# Patient Record
Sex: FEMALE | Race: BLACK OR AFRICAN AMERICAN | NOT HISPANIC OR LATINO | Employment: FULL TIME | URBAN - METROPOLITAN AREA
[De-identification: names, ages, dates, MRNs, and addresses within clinical notes are randomized per-mention and may not be internally consistent; named-entity substitution may affect disease eponyms.]

---

## 2017-01-21 DIAGNOSIS — I10 HYPERTENSION: ICD-10-CM

## 2017-01-21 DIAGNOSIS — I10 ESSENTIAL HYPERTENSION: ICD-10-CM

## 2017-01-22 RX ORDER — NIFEDIPINE 60 MG/1
TABLET, EXTENDED RELEASE ORAL
Qty: 30 TABLET | Refills: 0 | Status: SHIPPED | OUTPATIENT
Start: 2017-01-22 | End: 2017-03-05 | Stop reason: SDUPTHER

## 2017-01-22 RX ORDER — METOPROLOL SUCCINATE 50 MG/1
TABLET, EXTENDED RELEASE ORAL
Qty: 30 TABLET | Refills: 0 | Status: SHIPPED | OUTPATIENT
Start: 2017-01-22 | End: 2017-03-05 | Stop reason: SDUPTHER

## 2017-02-01 ENCOUNTER — PATIENT MESSAGE (OUTPATIENT)
Dept: OBSTETRICS AND GYNECOLOGY | Facility: CLINIC | Age: 52
End: 2017-02-01

## 2017-02-01 ENCOUNTER — PATIENT MESSAGE (OUTPATIENT)
Dept: INTERNAL MEDICINE | Facility: CLINIC | Age: 52
End: 2017-02-01

## 2017-02-13 ENCOUNTER — PATIENT MESSAGE (OUTPATIENT)
Dept: OBSTETRICS AND GYNECOLOGY | Facility: CLINIC | Age: 52
End: 2017-02-13

## 2017-02-13 ENCOUNTER — PATIENT MESSAGE (OUTPATIENT)
Dept: INTERNAL MEDICINE | Facility: CLINIC | Age: 52
End: 2017-02-13

## 2017-02-22 DIAGNOSIS — I10 ESSENTIAL HYPERTENSION: ICD-10-CM

## 2017-02-22 DIAGNOSIS — I10 HYPERTENSION: ICD-10-CM

## 2017-02-22 RX ORDER — CHLORTHALIDONE 25 MG/1
TABLET ORAL
Qty: 90 TABLET | Refills: 0 | OUTPATIENT
Start: 2017-02-22

## 2017-02-22 RX ORDER — SPIRONOLACTONE 25 MG/1
TABLET ORAL
Qty: 60 TABLET | Refills: 0 | OUTPATIENT
Start: 2017-02-22

## 2017-03-05 DIAGNOSIS — I10 ESSENTIAL HYPERTENSION: ICD-10-CM

## 2017-03-05 DIAGNOSIS — I10 HYPERTENSION: ICD-10-CM

## 2017-03-06 RX ORDER — METOPROLOL SUCCINATE 50 MG/1
TABLET, EXTENDED RELEASE ORAL
Qty: 30 TABLET | Refills: 1 | Status: SHIPPED | OUTPATIENT
Start: 2017-03-06 | End: 2017-04-30 | Stop reason: SDUPTHER

## 2017-03-06 RX ORDER — SPIRONOLACTONE 25 MG/1
TABLET ORAL
Qty: 60 TABLET | Refills: 0 | OUTPATIENT
Start: 2017-03-06

## 2017-03-06 RX ORDER — CHLORTHALIDONE 25 MG/1
TABLET ORAL
Qty: 90 TABLET | Refills: 0 | Status: SHIPPED | OUTPATIENT
Start: 2017-03-06 | End: 2017-04-30 | Stop reason: SDUPTHER

## 2017-03-06 RX ORDER — NIFEDIPINE 60 MG/1
TABLET, EXTENDED RELEASE ORAL
Qty: 30 TABLET | Refills: 1 | Status: SHIPPED | OUTPATIENT
Start: 2017-03-06 | End: 2017-04-30 | Stop reason: SDUPTHER

## 2017-03-09 ENCOUNTER — PATIENT MESSAGE (OUTPATIENT)
Dept: INTERNAL MEDICINE | Facility: CLINIC | Age: 52
End: 2017-03-09

## 2017-03-09 DIAGNOSIS — I10 ESSENTIAL HYPERTENSION: ICD-10-CM

## 2017-03-09 DIAGNOSIS — I10 HYPERTENSION: ICD-10-CM

## 2017-03-09 DIAGNOSIS — E87.6 HYPOKALEMIA: ICD-10-CM

## 2017-03-09 RX ORDER — POTASSIUM CHLORIDE 20 MEQ/1
TABLET, EXTENDED RELEASE ORAL
Qty: 270 TABLET | Refills: 0 | Status: CANCELLED | OUTPATIENT
Start: 2017-03-09

## 2017-03-09 RX ORDER — METOPROLOL SUCCINATE 50 MG/1
50 TABLET, EXTENDED RELEASE ORAL DAILY
Qty: 30 TABLET | Refills: 1 | Status: CANCELLED | OUTPATIENT
Start: 2017-03-09

## 2017-03-09 RX ORDER — NIFEDIPINE 60 MG/1
60 TABLET, EXTENDED RELEASE ORAL DAILY
Qty: 30 TABLET | Refills: 1 | Status: CANCELLED | OUTPATIENT
Start: 2017-03-09

## 2017-03-09 RX ORDER — CHLORTHALIDONE 25 MG/1
25 TABLET ORAL EVERY MORNING
Qty: 90 TABLET | Refills: 0 | Status: CANCELLED | OUTPATIENT
Start: 2017-03-09

## 2017-03-10 DIAGNOSIS — E78.5 HLD (HYPERLIPIDEMIA): ICD-10-CM

## 2017-03-11 RX ORDER — SPIRONOLACTONE 25 MG/1
25 TABLET ORAL 2 TIMES DAILY
Qty: 60 TABLET | Refills: 1 | Status: SHIPPED | OUTPATIENT
Start: 2017-03-11 | End: 2017-04-30 | Stop reason: SDUPTHER

## 2017-03-13 RX ORDER — ATORVASTATIN CALCIUM 20 MG/1
20 TABLET, FILM COATED ORAL DAILY
Qty: 30 TABLET | Refills: 3 | Status: SHIPPED | OUTPATIENT
Start: 2017-03-13 | End: 2017-04-30 | Stop reason: SDUPTHER

## 2017-03-14 ENCOUNTER — HOSPITAL ENCOUNTER (OUTPATIENT)
Dept: RADIOLOGY | Facility: HOSPITAL | Age: 52
Discharge: HOME | End: 2017-03-14
Attending: INTERNAL MEDICINE
Payer: COMMERCIAL

## 2017-03-14 DIAGNOSIS — Z12.31 OTHER SCREENING MAMMOGRAM: ICD-10-CM

## 2017-03-14 PROCEDURE — 77067 SCR MAMMO BI INCL CAD: CPT | Mod: 26,,, | Performed by: RADIOLOGY

## 2017-03-14 PROCEDURE — 77067 SCR MAMMO BI INCL CAD: CPT | Mod: TC

## 2017-03-15 ENCOUNTER — OFFICE VISIT (OUTPATIENT)
Dept: OBSTETRICS AND GYNECOLOGY | Facility: CLINIC | Age: 52
End: 2017-03-15
Payer: COMMERCIAL

## 2017-03-15 VITALS
SYSTOLIC BLOOD PRESSURE: 138 MMHG | DIASTOLIC BLOOD PRESSURE: 86 MMHG | HEIGHT: 71 IN | BODY MASS INDEX: 38.73 KG/M2 | WEIGHT: 276.69 LBS

## 2017-03-15 DIAGNOSIS — Z01.419 ENCOUNTER FOR GYNECOLOGICAL EXAMINATION WITHOUT ABNORMAL FINDING: Primary | ICD-10-CM

## 2017-03-15 DIAGNOSIS — Z12.31 SCREENING MAMMOGRAM FOR HIGH-RISK PATIENT: ICD-10-CM

## 2017-03-15 DIAGNOSIS — Z12.4 PAP SMEAR FOR CERVICAL CANCER SCREENING: ICD-10-CM

## 2017-03-15 PROCEDURE — 99386 PREV VISIT NEW AGE 40-64: CPT | Mod: S$GLB,,, | Performed by: OBSTETRICS & GYNECOLOGY

## 2017-03-15 PROCEDURE — 88175 CYTOPATH C/V AUTO FLUID REDO: CPT

## 2017-03-15 PROCEDURE — 3079F DIAST BP 80-89 MM HG: CPT | Mod: S$GLB,,, | Performed by: OBSTETRICS & GYNECOLOGY

## 2017-03-15 PROCEDURE — 3075F SYST BP GE 130 - 139MM HG: CPT | Mod: S$GLB,,, | Performed by: OBSTETRICS & GYNECOLOGY

## 2017-03-15 PROCEDURE — 99999 PR PBB SHADOW E&M-EST. PATIENT-LVL III: CPT | Mod: PBBFAC,,, | Performed by: OBSTETRICS & GYNECOLOGY

## 2017-03-15 NOTE — PROGRESS NOTES
Subjective:       Patient ID: Lori Burgess is a 51 y.o. female.    Chief Complaint:  Well Woman      History of Present Illness  HPI  Lori Burgess is a 51 y.o. female  here for her annual GYN exam.  She has not been seen in over 3 years.  She describes her periods as stopped with LSH (Laparoscopic Supracervical Hysterectomy).  Denies break through bleeding.   Denies vaginal itching or irritation.  Denies vaginal discharge.  She is sexually active. She has had1 partner for the past year.     History of abnormal pap: No  Last Pap: approximate date  and was normal  Last MMG: normal--routine follow-up in 12 months  Last Colonoscopy:  May 2015: normal  Denies domestic violence. She does feel safe at home.     Past Medical History:   Diagnosis Date    Anemia     Diverticulosis     History of gastroesophageal reflux (GERD)     Hyperlipidemia     Hypertension     Stroke 2015    Trauma 1998    Fractured Right ankle     Past Surgical History:   Procedure Laterality Date    BARTHOLIN GLAND CYST EXCISION Bilateral     HYSTERECTOMY  10/2006    Laparoscopic Supracervical Hysterectomy    TUBAL LIGATION       Social History     Social History    Marital status: Single     Spouse name: N/A    Number of children: N/A    Years of education: N/A     Occupational History     [employer]     Social History Main Topics    Smoking status: Never Smoker    Smokeless tobacco: Never Used    Alcohol use Yes      Comment: rarely    Drug use: No    Sexual activity: Yes     Partners: Male     Birth control/ protection: None, Condom, See Surgical Hx     Other Topics Concern    Not on file     Social History Narrative     Family History   Problem Relation Age of Onset    Colon cancer Mother 55    Hypertension Mother     Diabetes Father     Hypertension Father     Hypertension Brother     Hypertension Sister     Heart disease Neg Hx     Heart attack Neg Hx     Breast cancer  "Neg Hx     Ovarian cancer Neg Hx      OB History      Para Term  AB TAB SAB Ectopic Multiple Living    3 3 3       3          /86  Ht 5' 11" (1.803 m)  Wt 125.5 kg (276 lb 10.8 oz)  LMP  (LMP Unknown)  BMI 38.59 kg/m2        GYN & OB History  No LMP recorded (lmp unknown). Patient has had a hysterectomy.   Date of Last Pap: 2012    OB History    Para Term  AB SAB TAB Ectopic Multiple Living   3 3 3       3      # Outcome Date GA Lbr Lion/2nd Weight Sex Delivery Anes PTL Lv   3 Term      Vag-Spont  N Y   2 Term      Vag-Spont  N Y   1 Term      Vag-Spont  N Y          Review of Systems  Review of Systems   Constitutional: Negative for activity change, appetite change, fatigue and unexpected weight change.   HENT: Negative.    Eyes: Negative for visual disturbance.   Respiratory: Negative for shortness of breath and wheezing.    Cardiovascular: Negative for chest pain, palpitations and leg swelling.   Gastrointestinal: Negative for abdominal pain, bloating and blood in stool.   Endocrine: Positive for hot flashes. Negative for diabetes and hair loss.   Genitourinary: Negative for decreased libido, dyspareunia and postmenopausal bleeding.   Musculoskeletal: Negative for back pain and joint swelling.   Skin:  Negative for no acne and hair changes.   Neurological: Negative for headaches.   Hematological: Does not bruise/bleed easily.   Psychiatric/Behavioral: Positive for sleep disturbance. Negative for depression. The patient is not nervous/anxious.    Breast: Negative for breast pain and nipple discharge          Objective:    Physical Exam:   Constitutional: She is oriented to person, place, and time. She appears well-developed and well-nourished.    HENT:   Head: Normocephalic and atraumatic.    Eyes: EOM are normal. Pupils are equal, round, and reactive to light.    Neck: Normal range of motion. Neck supple.    Cardiovascular: Normal rate and regular rhythm.   "   Pulmonary/Chest: Effort normal and breath sounds normal.   BREASTS: Symmetrical, no skin changes or visible lesions.  No palpable masses, nipple discharge bilaterally.          Abdominal: Soft. Bowel sounds are normal.     Genitourinary: Vagina normal. Pelvic exam was performed with patient supine.   Genitourinary Comments: PELVIC: Normal external genitalia without lesions.  Normal hair distribution.  Adequate perineal body, normal urethral meatus.  Vagina moist and well rugated without lesions or discharge.  Cervix pink, without lesions, discharge or tenderness.  No significant cystocele or rectocele.  Bimanual exam shows uterus to be SURGICALLY ABSENT.  Adnexa without masses or tenderness.               Musculoskeletal: Normal range of motion and moves all extremeties.       Neurological: She is alert and oriented to person, place, and time.    Skin: Skin is warm and dry.    Psychiatric: She has a normal mood and affect.          Assessment:        1. Encounter for gynecological examination without abnormal finding    2. Pap smear for cervical cancer screening    3. Screening mammogram for high-risk patient                Plan:      1. Encounter for gynecological examination without abnormal finding  COUNSELING:  The patient was counseled today on osteoporosis prevention, calcium supplementation, and regular weight bearing exercise. The patient was also counseled today on ACS PAP guidelines, with recommendations for yearly pelvic exams unless their uterus, cervix, and ovaries were removed for benign reasons; in that case, examinations every 3-5 years are recommended. The patient was also counseled regarding monthly breast self-examination, routine STD screening for at-risk populations, prophylactic immunizations for transmitted infections such as HPV, Pertussis, or Influenza as appropriate, and yearly mammograms when indicated by ACS guidelines. She was advised to see her primary care physician for all other  health maintenance.   FOLLOW-UP with me for next routine visit.         2. Pap smear for cervical cancer screening    - Liquid-based pap smear, screening    3. Screening mammogram for high-risk patient  - Mammo Digital Screening Bilat with CAD; Future       Return in about 1 year (around 3/15/2018).

## 2017-04-25 ENCOUNTER — OFFICE VISIT (OUTPATIENT)
Dept: INTERNAL MEDICINE | Facility: CLINIC | Age: 52
End: 2017-04-25
Payer: COMMERCIAL

## 2017-04-25 ENCOUNTER — LAB VISIT (OUTPATIENT)
Dept: LAB | Facility: HOSPITAL | Age: 52
End: 2017-04-25
Attending: INTERNAL MEDICINE
Payer: COMMERCIAL

## 2017-04-25 VITALS
SYSTOLIC BLOOD PRESSURE: 136 MMHG | DIASTOLIC BLOOD PRESSURE: 82 MMHG | BODY MASS INDEX: 37.05 KG/M2 | WEIGHT: 264.63 LBS | HEIGHT: 71 IN | HEART RATE: 68 BPM

## 2017-04-25 DIAGNOSIS — Z86.73 HISTORY OF STROKE: ICD-10-CM

## 2017-04-25 DIAGNOSIS — R73.03 PRE-DIABETES: ICD-10-CM

## 2017-04-25 DIAGNOSIS — Z00.00 ROUTINE MEDICAL EXAM: ICD-10-CM

## 2017-04-25 DIAGNOSIS — Z11.59 NEED FOR HEPATITIS C SCREENING TEST: ICD-10-CM

## 2017-04-25 DIAGNOSIS — I10 ESSENTIAL HYPERTENSION: ICD-10-CM

## 2017-04-25 DIAGNOSIS — Z00.00 ROUTINE MEDICAL EXAM: Primary | ICD-10-CM

## 2017-04-25 LAB
25(OH)D3+25(OH)D2 SERPL-MCNC: 19 NG/ML
ALBUMIN SERPL BCP-MCNC: 3.4 G/DL
ALP SERPL-CCNC: 82 U/L
ALT SERPL W/O P-5'-P-CCNC: 18 U/L
ANION GAP SERPL CALC-SCNC: 12 MMOL/L
AST SERPL-CCNC: 15 U/L
BACTERIA #/AREA URNS AUTO: ABNORMAL /HPF
BASOPHILS # BLD AUTO: 0.03 K/UL
BASOPHILS NFR BLD: 0.5 %
BILIRUB SERPL-MCNC: 0.5 MG/DL
BILIRUB UR QL STRIP: NEGATIVE
BUN SERPL-MCNC: 10 MG/DL
CALCIUM SERPL-MCNC: 9.3 MG/DL
CHLORIDE SERPL-SCNC: 101 MMOL/L
CHOLEST/HDLC SERPL: 3.9 {RATIO}
CLARITY UR REFRACT.AUTO: ABNORMAL
CO2 SERPL-SCNC: 29 MMOL/L
COLOR UR AUTO: ABNORMAL
CREAT SERPL-MCNC: 0.9 MG/DL
DIFFERENTIAL METHOD: ABNORMAL
EOSINOPHIL # BLD AUTO: 0.1 K/UL
EOSINOPHIL NFR BLD: 1.1 %
ERYTHROCYTE [DISTWIDTH] IN BLOOD BY AUTOMATED COUNT: 16.6 %
EST. GFR  (AFRICAN AMERICAN): >60 ML/MIN/1.73 M^2
EST. GFR  (NON AFRICAN AMERICAN): >60 ML/MIN/1.73 M^2
GLUCOSE SERPL-MCNC: 103 MG/DL
GLUCOSE UR QL STRIP: NEGATIVE
HCT VFR BLD AUTO: 37.6 %
HDL/CHOLESTEROL RATIO: 25.4 %
HDLC SERPL-MCNC: 130 MG/DL
HDLC SERPL-MCNC: 33 MG/DL
HGB BLD-MCNC: 12 G/DL
HGB UR QL STRIP: NEGATIVE
HYALINE CASTS UR QL AUTO: 1 /LPF
KETONES UR QL STRIP: NEGATIVE
LDLC SERPL CALC-MCNC: 80 MG/DL
LEUKOCYTE ESTERASE UR QL STRIP: ABNORMAL
LYMPHOCYTES # BLD AUTO: 1.7 K/UL
LYMPHOCYTES NFR BLD: 25.2 %
MCH RBC QN AUTO: 23.3 PG
MCHC RBC AUTO-ENTMCNC: 31.9 %
MCV RBC AUTO: 73 FL
MICROSCOPIC COMMENT: ABNORMAL
MONOCYTES # BLD AUTO: 0.5 K/UL
MONOCYTES NFR BLD: 7.4 %
NEUTROPHILS # BLD AUTO: 4.4 K/UL
NEUTROPHILS NFR BLD: 65.5 %
NITRITE UR QL STRIP: NEGATIVE
NON-SQ EPI CELLS #/AREA URNS AUTO: 1 /HPF
NONHDLC SERPL-MCNC: 97 MG/DL
PH UR STRIP: 5 [PH] (ref 5–8)
PLATELET # BLD AUTO: 310 K/UL
PMV BLD AUTO: 11.2 FL
POTASSIUM SERPL-SCNC: 3.1 MMOL/L
PROT SERPL-MCNC: 8.2 G/DL
PROT UR QL STRIP: ABNORMAL
RBC # BLD AUTO: 5.16 M/UL
RBC #/AREA URNS AUTO: 3 /HPF (ref 0–4)
SODIUM SERPL-SCNC: 142 MMOL/L
SP GR UR STRIP: >1.03 (ref 1–1.03)
SQUAMOUS #/AREA URNS AUTO: 3 /HPF
TRIGL SERPL-MCNC: 85 MG/DL
URN SPEC COLLECT METH UR: ABNORMAL
UROBILINOGEN UR STRIP-ACNC: ABNORMAL EU/DL
WBC # BLD AUTO: 6.64 K/UL
WBC #/AREA URNS AUTO: 31 /HPF (ref 0–5)

## 2017-04-25 PROCEDURE — 81001 URINALYSIS AUTO W/SCOPE: CPT

## 2017-04-25 PROCEDURE — 36415 COLL VENOUS BLD VENIPUNCTURE: CPT

## 2017-04-25 PROCEDURE — 99396 PREV VISIT EST AGE 40-64: CPT | Mod: S$GLB,,, | Performed by: INTERNAL MEDICINE

## 2017-04-25 PROCEDURE — 83036 HEMOGLOBIN GLYCOSYLATED A1C: CPT

## 2017-04-25 PROCEDURE — 80061 LIPID PANEL: CPT

## 2017-04-25 PROCEDURE — 86803 HEPATITIS C AB TEST: CPT

## 2017-04-25 PROCEDURE — 80053 COMPREHEN METABOLIC PANEL: CPT

## 2017-04-25 PROCEDURE — 3079F DIAST BP 80-89 MM HG: CPT | Mod: S$GLB,,, | Performed by: INTERNAL MEDICINE

## 2017-04-25 PROCEDURE — 85025 COMPLETE CBC W/AUTO DIFF WBC: CPT

## 2017-04-25 PROCEDURE — 3075F SYST BP GE 130 - 139MM HG: CPT | Mod: S$GLB,,, | Performed by: INTERNAL MEDICINE

## 2017-04-25 PROCEDURE — 82306 VITAMIN D 25 HYDROXY: CPT

## 2017-04-25 PROCEDURE — 99999 PR PBB SHADOW E&M-EST. PATIENT-LVL III: CPT | Mod: PBBFAC,,, | Performed by: INTERNAL MEDICINE

## 2017-04-25 NOTE — MR AVS SNAPSHOT
[facility]  [address]  [facility] [address]  Phone: [phone]  Fax: [phone]                  Lori Burgess   2017 9:30 AM   Office Visit    Description:  Female : 1965   Provider:  Darya Clinton MD   Department:  [facility]           Reason for Visit     Annual Exam           Diagnoses this Visit        Comments    Routine medical exam    -  Primary     Essential hypertension         Pre-diabetes         History of stroke         Obesity, Class II, BMI 35-39.9, with comorbidity         Need for hepatitis C screening test                To Do List           Future Appointments        Provider Department Dept Phone    2017 10:30 AM LAB, APPOINTMENT [facility] [phone]    2017 9:00 AM Darya Clinton MD [facility] [phone]      Goals (5 Years of Data)     None      Follow-Up and Disposition     Return in about 4 months (around 2017) for Blood Pressure Check.      [facility]     [facility] Nurse Care Line -  Assistance  Unless otherwise directed by your provider, please contact [facility], our nurse care line that is available for  assistance.     Registered nurses in the [facility] provide: appointment scheduling, clinical advisement, health education, and other advisory services.  Call: [phone] (toll free)               Medications           Message regarding Medications     Verify the changes and/or additions to your medication regime listed below are the same as discussed with your clinician today.  If any of these changes or additions are incorrect, please notify your healthcare provider.             Verify that the below list of medications is an accurate representation of the medications you are currently taking.  If none reported, the list may be blank. If incorrect, please contact your healthcare  "provider. Carry this list with you in case of emergency.           Current Medications     aspirin (ECOTRIN) 325 MG EC tablet Take 1 tablet (325 mg total) by mouth once daily.    chlorthalidone (HYGROTEN) 25 MG Tab TAKE ONE TABLET BY MOUTH IN THE MORNING    KLOR-CON M20 20 mEq tablet TAKE TWO TABLETS BY MOUTH ONCE DAILY IN THE MORNING AND ONE ONCE DAILY IN THE EVENING    metoprolol succinate (TOPROL-XL) 50 MG 24 hr tablet TAKE ONE TABLET BY MOUTH ONCE DAILY    nifedipine (PROCARDIA-XL) 60 MG (OSM) 24 hr tablet TAKE ONE TABLET BY MOUTH ONCE DAILY    spironolactone (ALDACTONE) 25 MG tablet Take 1 tablet (25 mg total) by mouth 2 (two) times daily.    atorvastatin (LIPITOR) 20 MG tablet Take 1 tablet (20 mg total) by mouth once daily.           Clinical Reference Information           Your Vitals Were     BP Pulse Height Weight Last Period BMI    136/82 68 5' 11" (1.803 m) 120 kg (264 lb 9.6 oz) (LMP Unknown) 36.9 kg/m2      Blood Pressure          Most Recent Value    BP  136/82      Allergies as of 4/25/2017     Ace Inhibitors    Benicar [Olmesartan]    Methyldopa    Clonidine      Immunizations Administered on Date of Encounter - 4/25/2017     None      Orders Placed During Today's Visit      Normal Orders This Visit    Urinalysis     Future Labs/Procedures Expected by Expires    CBC auto differential  4/25/2017 4/25/2018    Comprehensive metabolic panel  4/25/2017 4/25/2018    Hemoglobin A1c  4/25/2017 4/25/2018    Hepatitis C antibody  4/25/2017 6/24/2018    Lipid panel  4/25/2017 4/25/2018    Vitamin D  4/25/2017 4/25/2018      Language Assistance Services     ATTENTION: Language assistance services are available, free of charge. Please call [phone].      ATENCIÓN: Si habla español, tiene a montelongo disposición servicios gratuitos de asistencia lingüística. Llame al [phone].     PUNEET Ý: N?u b?n nói Ti?ng Vi?t, có các d?ch v? h? tr? ngôn ng? mi?n phí dành cho b?n. G?i s? [phone].         [facility] " [facility] complies with applicable Federal civil rights laws and does not discriminate on the basis of race, color, national origin, age, disability, or sex.

## 2017-04-26 LAB
ESTIMATED AVG GLUCOSE: 137 MG/DL
HBA1C MFR BLD HPLC: 6.4 %
HCV AB SERPL QL IA: NEGATIVE

## 2017-04-26 NOTE — PROGRESS NOTES
Subjective:       Patient ID: Lori Burgess is a 51 y.o. female.    Chief Complaint: Annual Exam    HPI Comments: Last seen 20 months ago. Returns for annual exam and f/u chronic medical conditions. No acute complaints.     PMH: .   Hypertension since age 20.   Left Lacunar CVA.  Pre-Diabetes, HbA1c 6.1%.  GERD.   Sigmoid Diverticulosis.   H/O Iron-def. Anemia.   Obesity.     PSH: BTL, Hysterectomy (benign), ovaries intact. Bilateral Bartholin's cysts.     Mammogram normal 3/17, Pap normal 3/17. Colonoscopy normal 5/15. Eye exam 2016 at [facility]. Flu shot .     Social: No tobacco or alcohol. , three adult children all live locally, six grandchildren.  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     FMH: Hypertension in both parents and siblings. Father  age 50 with complications of Diabetes including heart failure, kidney failure, bilateral amputee. Mother  age 55 with Colon cancer. Heart disease and Diabetes in paternal aunt also.     Allergies: ACE inhibitors, ARBs, Methyldopa, Clonidine.     Medications: list reviewed and reconciled, not currently taking Atorvastatin. Takes KCl one tab BID.          Review of Systems   Constitutional: Negative for activity change, appetite change, fatigue, fever and unexpected weight change.   HENT: Negative for congestion, hearing loss, rhinorrhea, sneezing, sore throat, trouble swallowing and voice change.    Eyes: Negative for pain, redness and visual disturbance.   Respiratory: Negative for cough, chest tightness, shortness of breath and wheezing.    Cardiovascular: Negative for chest pain, palpitations and leg swelling.   Gastrointestinal: Negative for abdominal pain, blood in stool, constipation, diarrhea, nausea and vomiting.   Genitourinary: Negative for difficulty urinating, dysuria, flank pain, frequency, hematuria and urgency.   Musculoskeletal: Negative for arthralgias, back pain, joint swelling, myalgias and neck pain.  "  Skin: Negative for color change and rash.   Neurological: Negative for dizziness, syncope, facial asymmetry, speech difficulty, weakness, numbness and headaches.   Hematological: Negative for adenopathy. Does not bruise/bleed easily.   Psychiatric/Behavioral: Negative for decreased concentration, dysphoric mood and sleep disturbance. The patient is not nervous/anxious.         Stress at work, short staffed, long hours.        Objective:    /82, Pulse 68, Ht 5' 11", Wt 264.6 lbs (unchanged), BMI=36.9  Physical Exam   Constitutional: She is oriented to person, place, and time. She appears well-developed and well-nourished. No distress.   HENT:   Head: Normocephalic and atraumatic.   Right Ear: External ear normal.   Left Ear: External ear normal.   Nose: Nose normal.   Mouth/Throat: Oropharynx is clear and moist. No oropharyngeal exudate.   Eyes: Conjunctivae and EOM are normal. Pupils are equal, round, and reactive to light. Right conjunctiva is not injected. Left conjunctiva is not injected. No scleral icterus.   Neck: Normal range of motion. Neck supple. No JVD present. Carotid bruit is not present. No thyromegaly present.   Cardiovascular: Normal rate, regular rhythm, normal heart sounds and intact distal pulses.  Exam reveals no gallop and no friction rub.    No murmur heard.  Pulmonary/Chest: Effort normal and breath sounds normal. No respiratory distress. She has no wheezes. She has no rhonchi. She has no rales.   Abdominal: Soft. Bowel sounds are normal. She exhibits no distension and no mass. There is no hepatosplenomegaly. There is no tenderness. There is no CVA tenderness.   Musculoskeletal: Normal range of motion. She exhibits no tenderness or deformity.   Mild pitting edema distal 1/2 both lower legs.    Lymphadenopathy:     She has no cervical adenopathy.   Neurological: She is alert and oriented to person, place, and time. She has normal strength. No cranial nerve deficit. She exhibits normal " muscle tone. Coordination and gait normal.   Skin: Skin is warm and dry. No lesion and no rash noted. She is not diaphoretic. No cyanosis or erythema. No pallor. Nails show no clubbing.   Psychiatric: She has a normal mood and affect. Her behavior is normal. Thought content normal.   Vitals reviewed.      Assessment:       1. Routine medical exam    2. Essential hypertension    3. Pre-diabetes    4. History of stroke    5. Obesity, Class II, BMI 35-39.9, with comorbidity    6. Need for hepatitis C screening test        Plan:       Routine medical exam  -     CBC auto differential; Future; Expected date: 4/25/17  -     Comprehensive metabolic panel; Future; Expected date: 4/25/17  -     Lipid panel; Future; Expected date: 4/25/17  -     Vitamin D; Future; Expected date: 4/25/17  -     Urinalysis    Essential hypertension close to goal        -     Continue present meds, depending on lab results        -     Lifestyle modification and weight loss encouraged.     Pre-diabetes  -     Hemoglobin A1c; Future; Expected date: 4/25/17    History of stroke - risk factor modification, will resume statin.    Obesity, Class II, BMI 35-39.9, with comorbidity        -     Counseled on diet and exercise for weight reduction, diabetes prevention and cardiovascular health.    Need for hepatitis C screening test  -     Hepatitis C antibody; Future; Expected date: 4/25/17

## 2017-04-30 ENCOUNTER — PATIENT MESSAGE (OUTPATIENT)
Dept: INTERNAL MEDICINE | Facility: CLINIC | Age: 52
End: 2017-04-30

## 2017-04-30 DIAGNOSIS — E55.9 VITAMIN D INSUFFICIENCY: ICD-10-CM

## 2017-04-30 DIAGNOSIS — I10 ESSENTIAL HYPERTENSION: ICD-10-CM

## 2017-04-30 DIAGNOSIS — Z86.73 HISTORY OF STROKE: ICD-10-CM

## 2017-04-30 DIAGNOSIS — R73.03 PRE-DIABETES: Primary | ICD-10-CM

## 2017-04-30 DIAGNOSIS — E78.5 HYPERLIPIDEMIA, UNSPECIFIED HYPERLIPIDEMIA TYPE: ICD-10-CM

## 2017-04-30 DIAGNOSIS — E87.6 HYPOKALEMIA: ICD-10-CM

## 2017-04-30 DIAGNOSIS — N39.0 URINARY TRACT INFECTION WITHOUT HEMATURIA, SITE UNSPECIFIED: Primary | ICD-10-CM

## 2017-04-30 RX ORDER — SULFAMETHOXAZOLE AND TRIMETHOPRIM 800; 160 MG/1; MG/1
1 TABLET ORAL 2 TIMES DAILY
Qty: 6 TABLET | Refills: 0 | Status: SHIPPED | OUTPATIENT
Start: 2017-04-30 | End: 2017-05-03

## 2017-04-30 RX ORDER — POTASSIUM CHLORIDE 20 MEQ/1
40 TABLET, EXTENDED RELEASE ORAL 2 TIMES DAILY
Qty: 360 TABLET | Refills: 1 | Status: SHIPPED | OUTPATIENT
Start: 2017-04-30 | End: 2017-09-17 | Stop reason: ALTCHOICE

## 2017-04-30 RX ORDER — SPIRONOLACTONE 25 MG/1
25 TABLET ORAL 2 TIMES DAILY
Qty: 60 TABLET | Refills: 5 | Status: SHIPPED | OUTPATIENT
Start: 2017-04-30 | End: 2017-09-17 | Stop reason: ALTCHOICE

## 2017-04-30 RX ORDER — CHLORTHALIDONE 25 MG/1
25 TABLET ORAL EVERY MORNING
Qty: 90 TABLET | Refills: 1 | Status: SHIPPED | OUTPATIENT
Start: 2017-04-30 | End: 2017-09-17 | Stop reason: SINTOL

## 2017-04-30 RX ORDER — ATORVASTATIN CALCIUM 20 MG/1
20 TABLET, FILM COATED ORAL DAILY
Qty: 30 TABLET | Refills: 5 | Status: SHIPPED | OUTPATIENT
Start: 2017-04-30 | End: 2017-09-20 | Stop reason: SDUPTHER

## 2017-04-30 RX ORDER — METOPROLOL SUCCINATE 50 MG/1
50 TABLET, EXTENDED RELEASE ORAL DAILY
Qty: 30 TABLET | Refills: 5 | Status: SHIPPED | OUTPATIENT
Start: 2017-04-30 | End: 2017-09-20 | Stop reason: SDUPTHER

## 2017-04-30 RX ORDER — NIFEDIPINE 60 MG/1
60 TABLET, EXTENDED RELEASE ORAL DAILY
Qty: 30 TABLET | Refills: 5 | Status: SHIPPED | OUTPATIENT
Start: 2017-04-30 | End: 2017-09-20 | Stop reason: SDUPTHER

## 2017-05-02 ENCOUNTER — PATIENT MESSAGE (OUTPATIENT)
Dept: INTERNAL MEDICINE | Facility: CLINIC | Age: 52
End: 2017-05-02

## 2017-05-04 ENCOUNTER — OFFICE VISIT (OUTPATIENT)
Dept: INTERNAL MEDICINE | Facility: CLINIC | Age: 52
End: 2017-05-04
Payer: COMMERCIAL

## 2017-05-04 ENCOUNTER — PATIENT MESSAGE (OUTPATIENT)
Dept: INTERNAL MEDICINE | Facility: CLINIC | Age: 52
End: 2017-05-04

## 2017-05-04 ENCOUNTER — TELEPHONE (OUTPATIENT)
Dept: INTERNAL MEDICINE | Facility: CLINIC | Age: 52
End: 2017-05-04

## 2017-05-04 ENCOUNTER — HOSPITAL ENCOUNTER (OUTPATIENT)
Dept: RADIOLOGY | Facility: HOSPITAL | Age: 52
Discharge: HOME | End: 2017-05-04
Attending: NURSE PRACTITIONER
Payer: COMMERCIAL

## 2017-05-04 VITALS
DIASTOLIC BLOOD PRESSURE: 80 MMHG | WEIGHT: 277 LBS | TEMPERATURE: 99 F | OXYGEN SATURATION: 97 % | BODY MASS INDEX: 38.78 KG/M2 | HEART RATE: 77 BPM | HEIGHT: 71 IN | SYSTOLIC BLOOD PRESSURE: 142 MMHG

## 2017-05-04 DIAGNOSIS — M79.89 SWELLING OF LEFT LOWER EXTREMITY: ICD-10-CM

## 2017-05-04 DIAGNOSIS — R60.0 BILATERAL EDEMA OF LOWER EXTREMITY: Primary | ICD-10-CM

## 2017-05-04 DIAGNOSIS — M79.89 SWELLING OF LEFT LOWER EXTREMITY: Primary | ICD-10-CM

## 2017-05-04 PROCEDURE — 3079F DIAST BP 80-89 MM HG: CPT | Mod: S$GLB,,, | Performed by: NURSE PRACTITIONER

## 2017-05-04 PROCEDURE — 93970 EXTREMITY STUDY: CPT | Mod: TC

## 2017-05-04 PROCEDURE — 93970 EXTREMITY STUDY: CPT | Mod: 26,,, | Performed by: RADIOLOGY

## 2017-05-04 PROCEDURE — 3077F SYST BP >= 140 MM HG: CPT | Mod: S$GLB,,, | Performed by: NURSE PRACTITIONER

## 2017-05-04 PROCEDURE — 99214 OFFICE O/P EST MOD 30 MIN: CPT | Mod: S$GLB,,, | Performed by: NURSE PRACTITIONER

## 2017-05-04 PROCEDURE — 99999 PR PBB SHADOW E&M-EST. PATIENT-LVL IV: CPT | Mod: 25,PBBFAC,, | Performed by: NURSE PRACTITIONER

## 2017-05-04 PROCEDURE — 1160F RVW MEDS BY RX/DR IN RCRD: CPT | Mod: S$GLB,,, | Performed by: NURSE PRACTITIONER

## 2017-05-04 NOTE — PATIENT INSTRUCTIONS
Leg Swelling in a Single Leg  Swelling of the arms, feet, ankles, and legs is called edema. It is caused by extra fluid collecting in the tissues. Because of gravity, extra fluid in the body settles to the lowest part. That is why the legs and feet are most affected. You have swelling in a single leg.  Some of the causes for swelling in only a single leg include:  · Infection in the foot or leg  · Long-term problem with a vein not working well (venous insufficiency)  · Swollen, twisted vein in the leg (varicose veins)  · Insect bite or sting on the foot or leg  · Injury or recent surgery on the foot or leg  · Blood clot in a deep vein of the leg (deep vein thrombosis or DVT)  · Inflammation of the joints of the lower leg  Medical treatment will depend on what is causing your swelling.  Home care  Follow these guidelines when caring for yourself at home:  · Dont wear tight clothing.  · Keep your legs up while lying or sitting.  · Take any medicines as directed.  · If infection, injury, or recent surgery is the cause of your swelling, stay off your legs as much as possible until your symptoms get better.  · If you have venous insufficiency or varicose veins, dont sit or  one place for long periods of time. Take breaks and walk around every few hours. Talk with your healthcare provider about wearing support stockings to help lessen swelling during the day.  · Wear compression stockings with your doctor's approval  Follow-up care  Follow up with your healthcare provider as advised.  Call 911  Call 911 if any of these occur:  · Shortness of breath or trouble breathing  · Chest pain  · Coughing up blood  · Fainting or loss of consciousness   When to seek medical advice  Call your healthcare provider right away if any of these occur:  · Increased pain, swelling, warmth, or redness of the leg, ankle, or foot  · Fever of 100.4°F (38ºC) or higher, or as directed by your healthcare provider  · Weakness or  dizziness  · Shaking chills  · Drenching sweats  Date Last Reviewed: 4/11/2016  © 7936-6906 [employer]. [address] [place] [address], [place] [address]. All rights reserved. This information is not intended as a substitute for professional medical care. Always follow your healthcare professional's instructions.

## 2017-05-04 NOTE — PROGRESS NOTES
Subjective:       Patient ID: Lori Burgess is a 51 y.o. female.    Chief Complaint: Leg Swelling    HPI   Leg swelling  O=2 weeks ago  L=left leg  D=continuous  C=throbbing  Al=staying off of it Ag=standing a long time As=calf pain  R=no  T=all day  S=5/10    Review of Systems   Constitutional: Negative for activity change, appetite change, chills, diaphoresis and fever.   HENT: Negative for congestion, ear discharge, ear pain, nosebleeds, postnasal drip, rhinorrhea, sinus pressure, sneezing, sore throat and trouble swallowing.    Eyes: Negative for photophobia, discharge, redness, itching and visual disturbance.   Respiratory: Negative for chest tightness and shortness of breath.    Cardiovascular: Positive for leg swelling (bilateral, worse on left). Negative for chest pain.   Gastrointestinal: Negative for abdominal distention, abdominal pain, blood in stool, constipation, diarrhea, nausea and vomiting.   Genitourinary: Negative for dysuria, frequency, hematuria, urgency and vaginal discharge.   Musculoskeletal: Negative for arthralgias, back pain and myalgias.   Skin: Negative for rash and wound.   Neurological: Negative for dizziness, syncope and headaches.   Hematological: Negative for adenopathy.   Psychiatric/Behavioral: Negative for suicidal ideas.   All other systems reviewed and are negative.      Objective:      Physical Exam   Constitutional: She is oriented to person, place, and time. Vital signs are normal. She appears well-developed and well-nourished. She is cooperative.  Non-toxic appearance. She does not have a sickly appearance. She does not appear ill. No distress.   HENT:   Head: Normocephalic and atraumatic.   Right Ear: Hearing and external ear normal.   Left Ear: Hearing and external ear normal.   Nose: Nose normal.   Eyes: Conjunctivae and EOM are normal. Pupils are equal, round, and reactive to light. Right eye exhibits no discharge. Left eye exhibits no discharge.   Neck: Normal  "range of motion. Neck supple.   Cardiovascular: Normal rate, regular rhythm and intact distal pulses.    Pulmonary/Chest: Effort normal. No respiratory distress. She exhibits no tenderness.   Abdominal: Normal appearance.   Musculoskeletal: Normal range of motion. She exhibits no tenderness.        Right lower leg: She exhibits edema (1-2+).        Left lower leg: She exhibits edema (2+, skin pink, tight, shiny).   Neurological: She is alert and oriented to person, place, and time. She has normal reflexes.   Skin: Skin is warm and dry.   Psychiatric: She has a normal mood and affect. Her behavior is normal. Judgment and thought content normal.       Assessment:       1. Swelling of left lower extremity        Plan:       Lori was seen today for leg swelling.    Diagnoses and all orders for this visit:    Swelling of left lower extremity  -     Cardiology Lab US Lower Extremity Veins Bilateral; Future  -     US Lower Extremity Veins Bilateral; Future        Pt has been given instructions populated from [employer] database and has verbalized understanding of the after visit summary and information contained wherein.    Follow up with a primary care provider. May go to ER for acute shortness of breath, lightheadedness, fever, or any other emergent complaints or changes in condition.    "This note will be shared with the patient"    The [employer] medical Dictation software was used during the dictation of portions or the entirety of this medical record.  Phonetic or grammatic errors may exist due to the use of this software. For clarification, refer to the author of the document.             "

## 2017-05-04 NOTE — MR AVS SNAPSHOT
[facility] Medicine  [address] [facility] [address]  Phone: [phone]  Fax: [phone]                  Lori Burgess   2017 1:30 PM   Office Visit    Description:  Female : 1965   Provider:  Americo Martin DNP   Department:  [facility] - Internal Medicine           Reason for Visit     Leg Swelling           Diagnoses this Visit        Comments    Swelling of left lower extremity    -  Primary            To Do List           Future Appointments        Provider Department Dept Phone    2017 9:00 AM DIABETES EDUCATOR, INT MED 1 [facility] [phone]    2017 9:00 AM Darya Clinton MD [facility] [phone]      Goals (5 Years of Data)     None      [facility]     [facility] Nurse Care Line -  Assistance  Unless otherwise directed by your provider, please contact [facility], our nurse care line that is available for  assistance.     Registered nurses in the [facility] On Call Center provide: appointment scheduling, clinical advisement, health education, and other advisory services.  Call: [phone] (toll free)               Medications           Message regarding Medications     Verify the changes and/or additions to your medication regime listed below are the same as discussed with your clinician today.  If any of these changes or additions are incorrect, please notify your healthcare provider.             Verify that the below list of medications is an accurate representation of the medications you are currently taking.  If none reported, the list may be blank. If incorrect, please contact your healthcare provider. Carry this list with you in case of emergency.           Current Medications     aspirin (ECOTRIN) 325 MG EC tablet Take 1 tablet (325 mg total) by mouth once daily.    atorvastatin (LIPITOR) 20 MG tablet Take 1 tablet (20 mg total) by mouth once daily.    chlorthalidone  "(HYGROTEN) 25 MG Tab Take 1 tablet (25 mg total) by mouth every morning.    metoprolol succinate (TOPROL-XL) 50 MG 24 hr tablet Take 1 tablet (50 mg total) by mouth once daily.    nifedipine (PROCARDIA-XL) 60 MG (OSM) 24 hr tablet Take 1 tablet (60 mg total) by mouth once daily.    potassium chloride SA (KLOR-CON M20) 20 MEQ tablet Take 2 tablets (40 mEq total) by mouth 2 (two) times daily.    spironolactone (ALDACTONE) 25 MG tablet Take 1 tablet (25 mg total) by mouth 2 (two) times daily.           Clinical Reference Information           Your Vitals Were     BP Pulse Temp Height Weight Last Period    142/80 77 98.6 °F (37 °C) 5' 11" (1.803 m) 125.6 kg (277 lb) (LMP Unknown)    SpO2 BMI             97% 38.63 kg/m2         Blood Pressure          Most Recent Value    BP  (!)  142/80      Allergies as of 5/4/2017     Ace Inhibitors    Benicar [Olmesartan]    Methyldopa    Clonidine      Immunizations Administered on Date of Encounter - 5/4/2017     None      Orders Placed During Today's Visit     Future Labs/Procedures Expected by Expires    US Lower Extremity Veins Bilateral  5/4/2017 5/4/2018    Cardiology Lab US Lower Extremity Veins Bilateral  As directed 5/4/2018      Instructions      Leg Swelling in a Single Leg  Swelling of the arms, feet, ankles, and legs is called edema. It is caused by extra fluid collecting in the tissues. Because of gravity, extra fluid in the body settles to the lowest part. That is why the legs and feet are most affected. You have swelling in a single leg.  Some of the causes for swelling in only a single leg include:  · Infection in the foot or leg  · Long-term problem with a vein not working well (venous insufficiency)  · Swollen, twisted vein in the leg (varicose veins)  · Insect bite or sting on the foot or leg  · Injury or recent surgery on the foot or leg  · Blood clot in a deep vein of the leg (deep vein thrombosis or DVT)  · Inflammation of the joints of the lower leg  Medical " treatment will depend on what is causing your swelling.  Home care  Follow these guidelines when caring for yourself at home:  · Dont wear tight clothing.  · Keep your legs up while lying or sitting.  · Take any medicines as directed.  · If infection, injury, or recent surgery is the cause of your swelling, stay off your legs as much as possible until your symptoms get better.  · If you have venous insufficiency or varicose veins, dont sit or  one place for long periods of time. Take breaks and walk around every few hours. Talk with your healthcare provider about wearing support stockings to help lessen swelling during the day.  · Wear compression stockings with your doctor's approval  Follow-up care  Follow up with your healthcare provider as advised.  Call 911  Call 911 if any of these occur:  · Shortness of breath or trouble breathing  · Chest pain  · Coughing up blood  · Fainting or loss of consciousness   When to seek medical advice  Call your healthcare provider right away if any of these occur:  · Increased pain, swelling, warmth, or redness of the leg, ankle, or foot  · Fever of 100.4°F (38ºC) or higher, or as directed by your healthcare provider  · Weakness or dizziness  · Shaking chills  · Drenching sweats  Date Last Reviewed: 4/11/2016 © 2000-2016 [employer]. [address] [place] [address]. All rights reserved. This information is not intended as a substitute for professional medical care. Always follow your healthcare professional's instructions.             Language Assistance Services     ATTENTION: Language assistance services are available, free of charge. Please call [phone].      ATENCIÓN: Si habla matteoañol, tiene a montelongo disposición servicios gratuitos de asistencia lingüística. Llame al [phone].     [facility] Ý: N?u b?n nói Ti?ng Vi?t, có các d?ch v? h? tr? ngôn ng? mi?n phí dành cho b?n. G?i s? [phone].         [facility] complies  with applicable Federal civil rights laws and does not discriminate on the basis of race, color, national origin, age, disability, or sex.

## 2017-05-04 NOTE — TELEPHONE ENCOUNTER
Please call pt let her know that she is neg for blood clots, Ihave ordered tests in lab to check for other reasons for swelling.  Help her have these scheduled.

## 2017-05-05 ENCOUNTER — LAB VISIT (OUTPATIENT)
Dept: LAB | Facility: HOSPITAL | Age: 52
End: 2017-05-05
Attending: NURSE PRACTITIONER
Payer: COMMERCIAL

## 2017-05-05 DIAGNOSIS — R60.0 BILATERAL EDEMA OF LOWER EXTREMITY: ICD-10-CM

## 2017-05-05 LAB
ALBUMIN SERPL BCP-MCNC: 3.2 G/DL
ALP SERPL-CCNC: 69 U/L
ALT SERPL W/O P-5'-P-CCNC: 13 U/L
ANION GAP SERPL CALC-SCNC: 10 MMOL/L
AST SERPL-CCNC: 13 U/L
BILIRUB SERPL-MCNC: 0.7 MG/DL
BNP SERPL-MCNC: 50 PG/ML
BUN SERPL-MCNC: 9 MG/DL
CALCIUM SERPL-MCNC: 9.4 MG/DL
CHLORIDE SERPL-SCNC: 100 MMOL/L
CO2 SERPL-SCNC: 30 MMOL/L
CREAT SERPL-MCNC: 0.8 MG/DL
ERYTHROCYTE [DISTWIDTH] IN BLOOD BY AUTOMATED COUNT: 16.4 %
EST. GFR  (AFRICAN AMERICAN): >60 ML/MIN/1.73 M^2
EST. GFR  (NON AFRICAN AMERICAN): >60 ML/MIN/1.73 M^2
GLUCOSE SERPL-MCNC: 105 MG/DL
HCT VFR BLD AUTO: 36.2 %
HGB BLD-MCNC: 11.5 G/DL
MCH RBC QN AUTO: 23.6 PG
MCHC RBC AUTO-ENTMCNC: 31.8 %
MCV RBC AUTO: 74 FL
PLATELET # BLD AUTO: 300 K/UL
PMV BLD AUTO: 11.1 FL
POTASSIUM SERPL-SCNC: 3 MMOL/L
PROT SERPL-MCNC: 8 G/DL
RBC # BLD AUTO: 4.87 M/UL
SODIUM SERPL-SCNC: 140 MMOL/L
WBC # BLD AUTO: 6.23 K/UL

## 2017-05-05 PROCEDURE — 80053 COMPREHEN METABOLIC PANEL: CPT

## 2017-05-05 PROCEDURE — 85027 COMPLETE CBC AUTOMATED: CPT

## 2017-05-05 PROCEDURE — 83880 ASSAY OF NATRIURETIC PEPTIDE: CPT

## 2017-05-05 PROCEDURE — 36415 COLL VENOUS BLD VENIPUNCTURE: CPT

## 2017-05-06 ENCOUNTER — PATIENT MESSAGE (OUTPATIENT)
Dept: INTERNAL MEDICINE | Facility: CLINIC | Age: 52
End: 2017-05-06

## 2017-05-08 ENCOUNTER — TELEPHONE (OUTPATIENT)
Dept: INTERNAL MEDICINE | Facility: CLINIC | Age: 52
End: 2017-05-08

## 2017-05-08 NOTE — TELEPHONE ENCOUNTER
----- Message from Radha Wild sent at 5/8/2017  4:19 PM CDT -----  Contact: pt [phone]  The pt would like a call from the nurse in regards to her lab test please advise

## 2017-05-12 ENCOUNTER — PATIENT MESSAGE (OUTPATIENT)
Dept: INTERNAL MEDICINE | Facility: CLINIC | Age: 52
End: 2017-05-12

## 2017-05-15 DIAGNOSIS — E87.6 HYPOKALEMIA: ICD-10-CM

## 2017-05-15 RX ORDER — POTASSIUM CHLORIDE 1500 MG/1
TABLET, EXTENDED RELEASE ORAL
Qty: 270 TABLET | Refills: 0 | OUTPATIENT
Start: 2017-05-15

## 2017-05-25 ENCOUNTER — PATIENT MESSAGE (OUTPATIENT)
Dept: INTERNAL MEDICINE | Facility: CLINIC | Age: 52
End: 2017-05-25

## 2017-06-06 ENCOUNTER — CLINICAL SUPPORT (OUTPATIENT)
Dept: DIABETES | Facility: CLINIC | Age: 52
End: 2017-06-06
Payer: COMMERCIAL

## 2017-06-06 DIAGNOSIS — R73.03 PRE-DIABETES: ICD-10-CM

## 2017-06-06 PROCEDURE — G0108 DIAB MANAGE TRN  PER INDIV: HCPCS | Mod: S$GLB,,, | Performed by: DIETITIAN, REGISTERED

## 2017-06-06 NOTE — PROGRESS NOTES
Diabetes Education  Author: Ce Rothman RD  Date: 6/6/2017    Diabetes Education Visit  Diabetes Education Record Assessment/Progress: Initial    Diabetes Type  Diabetes Type : Pre-Diabetes    Diabetes History  Diabetes Diagnosis: 0-1 year (new dx)    Nutrition  Meal Planning: skipping meals, water (since PCP visit, has cut back on sweets, avoiding sugary beverages, and drinking more water)  Meal Plan 24 Hour Recall - Breakfast: sometimes skips, sometimes instant oatmeal  Meal Plan 24 Hour Recall - Lunch: sometimes skips depending on work  Meal Plan 24 Hour Recall - Dinner: larger meal    Monitoring   Self Monitoring : N/A (prediabetes)    Exercise   Exercise Type:  (walks a lot at work, no structured exercise)    Current Diabetes Treatment   Current Treatment:  (not on any meds for blood glucose)    Social History  Preferred Learning Method: Face to Face, Reading Materials  Primary Support: Self  Occupation: works at correctional facility  Smoking Status: Never a Smoker          Barriers to Change  Barriers to Change: None  Learning Challenges : None    Readiness to Learn   Readiness to Learn : Acceptance    Cultural Influences  Cultural Influences: No    Diabetes Education Assessment/Progress    Acute Complications (preventing, detecting, and treating acute complications): Discussion, Instructed, Written Materials Provided, Individual Session, Competent (verbalizes/demonstrates) (Reviewed s/s of hyperglycemia. Not likely to have hypos since not on any glucose lowering meds. )    Chronic Complications (preventing, detecting, and treating chronic complications): Discussion, Instructed, Competent (verbalizes/demonstrates), Written Materials Provided, Individual Session    Nutrition (Incorporating nutritional management into one's lifestyle): Discussion, Instructed, Competent (verbalizes/demonstrates), Written Materials Provided, Individual Session (Reviewed consistent CHO diet - sources of CHO, serving sizes,  reading labels, avoid skipping meals, and limiting snacks. Rec'd 45-60g CHO/meal, 3 meals daily and limiting snack to 15-20g CHO. Encouraged balancing meals with lean proteins and heart-healthy fats. Also reviewed 2000mg sodium diet guidelines. )    Physical Activity (incorporating physical activity into one's lifestyle): Discussion, Instructed, Competent (verbalizes/demonstrates), Written Materials Provided, Individual Session (Reviewed benefits and goals of physical activity. Encouraged 150 min per week. )    Medications (states correct name, dose, onset, peak, duration, side effects & timing of meds): Not Applicable    Monitoring (monitoring blood glucose/other parameters & using results): Discussion, Individual Session, Written Materials Provided (Discussed insurance usually does not cover testing supplies for pre-diabetes. But if interested in SMBG at home, can purchase [employer] prime OTC meter. Reviewed goal BGs. )    Goal Setting and Problem Solving (verbalizes behavior change strategies & sets realistic goals): Discussion, Individual Session    Behavior Change (developing personal strategies to health & behavior change): Discussion, Individual Session    Goals  Healthy Eating: Set (Reduce portion sizes of starches. )  Start Date: 06/06/17  Target Date: 09/06/17         Diabetes Care Plan/Intervention  Education Plan/Intervention: Other (Will call or send message via [facility].)    Diabetes Meal Plan  Restrictions: Low Sodium  Carbohydrate Per Meal: 45-60g  Carbohydrate Per Snack : 15-20g    Education Units of Time   Time Spent: 45 min      Health Maintenance Topics with due status: Not Due       Topic Last Completion Date    Colonoscopy 05/28/2015    Mammogram 03/14/2017    Lipid Panel 04/25/2017    Influenza Vaccine Not Due     Health Maintenance Due   Topic Date Due    TETANUS VACCINE  09/28/1983

## 2017-07-19 ENCOUNTER — PATIENT MESSAGE (OUTPATIENT)
Dept: INTERNAL MEDICINE | Facility: CLINIC | Age: 52
End: 2017-07-19

## 2017-08-28 ENCOUNTER — PATIENT MESSAGE (OUTPATIENT)
Dept: INTERNAL MEDICINE | Facility: CLINIC | Age: 52
End: 2017-08-28

## 2017-09-12 ENCOUNTER — LAB VISIT (OUTPATIENT)
Dept: LAB | Facility: HOSPITAL | Age: 52
End: 2017-09-12
Attending: INTERNAL MEDICINE
Payer: COMMERCIAL

## 2017-09-12 ENCOUNTER — OFFICE VISIT (OUTPATIENT)
Dept: INTERNAL MEDICINE | Facility: CLINIC | Age: 52
End: 2017-09-12
Payer: COMMERCIAL

## 2017-09-12 VITALS
HEART RATE: 66 BPM | WEIGHT: 277.63 LBS | SYSTOLIC BLOOD PRESSURE: 136 MMHG | DIASTOLIC BLOOD PRESSURE: 86 MMHG | HEIGHT: 71 IN | TEMPERATURE: 99 F | BODY MASS INDEX: 38.87 KG/M2

## 2017-09-12 DIAGNOSIS — E78.5 HYPERLIPIDEMIA, UNSPECIFIED HYPERLIPIDEMIA TYPE: ICD-10-CM

## 2017-09-12 DIAGNOSIS — T50.2X5A DIURETIC-INDUCED HYPOKALEMIA: ICD-10-CM

## 2017-09-12 DIAGNOSIS — E87.6 DIURETIC-INDUCED HYPOKALEMIA: ICD-10-CM

## 2017-09-12 DIAGNOSIS — E66.01 SEVERE OBESITY (BMI 35.0-39.9) WITH COMORBIDITY: ICD-10-CM

## 2017-09-12 DIAGNOSIS — R73.03 PRE-DIABETES: ICD-10-CM

## 2017-09-12 DIAGNOSIS — I10 ESSENTIAL HYPERTENSION: Primary | ICD-10-CM

## 2017-09-12 DIAGNOSIS — J00 COMMON COLD VIRUS: ICD-10-CM

## 2017-09-12 LAB
ANION GAP SERPL CALC-SCNC: 10 MMOL/L
BUN SERPL-MCNC: 12 MG/DL
CALCIUM SERPL-MCNC: 9.3 MG/DL
CHLORIDE SERPL-SCNC: 100 MMOL/L
CO2 SERPL-SCNC: 32 MMOL/L
CREAT SERPL-MCNC: 0.8 MG/DL
EST. GFR  (AFRICAN AMERICAN): >60 ML/MIN/1.73 M^2
EST. GFR  (NON AFRICAN AMERICAN): >60 ML/MIN/1.73 M^2
ESTIMATED AVG GLUCOSE: 126 MG/DL
GLUCOSE SERPL-MCNC: 96 MG/DL
HBA1C MFR BLD HPLC: 6 %
POTASSIUM SERPL-SCNC: 3 MMOL/L
SODIUM SERPL-SCNC: 142 MMOL/L

## 2017-09-12 PROCEDURE — 36415 COLL VENOUS BLD VENIPUNCTURE: CPT

## 2017-09-12 PROCEDURE — 3008F BODY MASS INDEX DOCD: CPT | Mod: S$GLB,,, | Performed by: INTERNAL MEDICINE

## 2017-09-12 PROCEDURE — 83036 HEMOGLOBIN GLYCOSYLATED A1C: CPT

## 2017-09-12 PROCEDURE — 3075F SYST BP GE 130 - 139MM HG: CPT | Mod: S$GLB,,, | Performed by: INTERNAL MEDICINE

## 2017-09-12 PROCEDURE — 80048 BASIC METABOLIC PNL TOTAL CA: CPT

## 2017-09-12 PROCEDURE — 99999 PR PBB SHADOW E&M-EST. PATIENT-LVL III: CPT | Mod: PBBFAC,,, | Performed by: INTERNAL MEDICINE

## 2017-09-12 PROCEDURE — 99214 OFFICE O/P EST MOD 30 MIN: CPT | Mod: S$GLB,,, | Performed by: INTERNAL MEDICINE

## 2017-09-12 PROCEDURE — 3079F DIAST BP 80-89 MM HG: CPT | Mod: S$GLB,,, | Performed by: INTERNAL MEDICINE

## 2017-09-12 NOTE — PROGRESS NOTES
Subjective:       Patient ID: Lori Burgess is a 51 y.o. female.    Chief Complaint: Hypertension    Last seen 4 months ago for annual exam. Hypertension was fairly controlled, but blood glucose was on the rise. Had Diabetes Education to address HbA1c 6.4%. Has made changes in her diet. Unfortunately, no weight loss. Here for f/u reporting cold symptoms x 5 days with nasal congestion, sneezing, clear rhinorrhea, dry cough, mild subjective fever and chills but no body aches. Using Mucinex DM, DayQuil, Veronica Hawthorne Cold medicine with temporary relief. No headache, facial pain/pressure, purulent nasal discharge, earache, sore throat, pleuritic chest pain, wheezing, SOB, N/V or diarrhea. Missing work today.    PMH: .   Hypertension since age 20.   Hyperlipidemia.   Left Lacunar CVA.  Pre-Diabetes, HbA1c 6.4%.  GERD.   Sigmoid Diverticulosis.   H/O Iron-def. Anemia.   Vitamin D insufficiency.  Obesity.     PSH: BTL, Hysterectomy (benign), ovaries intact. Bilateral Bartholin's cysts.     Mammogram normal 3/17, Pap normal 3/17. Colonoscopy normal 5/15. Eye exam 2016 at [facility]. Flu shot . Labs : CBC normal, CMP normal except Potassium 3.1, Vit D 19, TChol 130, TG 85, HDL 33, LDL 80, Hep C negative.     Social: No tobacco or alcohol. , three adult children all live locally, six grandchildren.  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     FMH: Hypertension in both parents and siblings. Father  age 50 with complications of Diabetes including heart failure, kidney failure, bilateral amputee. Mother  age 55 with Colon cancer. Heart disease and Diabetes in paternal aunt also.     Allergies: ACE inhibitors, ARBs, Methyldopa, Clonidine.     Medications: list reviewed and reconciled.           Review of Systems   Constitutional: Negative for unexpected weight change.   Respiratory: Positive for cough. Negative for shortness of breath.    Cardiovascular: Negative for chest pain  "and palpitations.        Mild leg swelling, ultrasound negative for DVT 5/17.   Genitourinary: Negative for dysuria and frequency.   Neurological: Negative for dizziness, syncope, weakness, numbness and headaches.       Objective:    /86, Pulse 66, Temp 98.6, Ht 5' 11", Wt 277.6 lbs, BMI=38.7  Physical Exam   Constitutional: She is oriented to person, place, and time. She appears well-developed and well-nourished.   Frequent dry cough.    HENT:   Mouth/Throat: No oropharyngeal exudate.   Edema of nasal turbinates with clear discharge.    Eyes: Conjunctivae are normal. Right eye exhibits no discharge. Left eye exhibits no discharge.   Neck: Normal range of motion. Neck supple.   Cardiovascular: Normal rate and regular rhythm.    Pulmonary/Chest: Effort normal and breath sounds normal. No accessory muscle usage. No tachypnea. No respiratory distress. She has no decreased breath sounds. She has no wheezes. She has no rhonchi. She has no rales.   Musculoskeletal: Normal range of motion.   Trace pretibial edema bilaterally. No dermatitis.   Lymphadenopathy:     She has no cervical adenopathy.   Neurological: She is alert and oriented to person, place, and time.   Skin: Skin is warm and dry. She is not diaphoretic.   Psychiatric: She has a normal mood and affect. Her behavior is normal.       Assessment:       1. Essential hypertension    2. Hyperlipidemia, unspecified hyperlipidemia type    3. Pre-diabetes    4. Severe obesity (BMI 35.0-39.9) with comorbidity    5. Common cold virus    6. Diuretic-induced hypokalemia        Plan:       Essential hypertension - fair, mildly elevated due to acute illness and cold meds, continue same.     Hyperlipidemia, unspecified hyperlipidemia type - controlled, continue same.    Pre-diabetes  -     Hemoglobin A1c today    Severe obesity (BMI 35.0-39.9) with comorbidity        -     Counseled on diet.     Common cold virus - no evidence of bacterial infection, no indication for " antibiotics.        -     Continue OTC meds prn symptoms.     Diuretic-induced hypokalemia  -     Basic metabolic panel today, consider stopping Chlorthalidone.    Flu shot when cold resolved.

## 2017-09-12 NOTE — LETTER
September 12, 2017      Lori Burgess   [address]  [place] [address]             [facility] - [facility] Medicine  [facility]  [place] [address]  Phone: [phone]  Fax: [phone] Lori Burgess    Was treated here on 09/12/2017.    May Return to work/school on Thursday 09/14/17.    No Restrictions.            Darya Clinton MD

## 2017-09-17 ENCOUNTER — PATIENT MESSAGE (OUTPATIENT)
Dept: INTERNAL MEDICINE | Facility: CLINIC | Age: 52
End: 2017-09-17

## 2017-09-17 DIAGNOSIS — I10 ESSENTIAL HYPERTENSION: ICD-10-CM

## 2017-09-17 RX ORDER — TRIAMTERENE AND HYDROCHLOROTHIAZIDE 37.5; 25 MG/1; MG/1
1 CAPSULE ORAL EVERY MORNING
Qty: 30 CAPSULE | Refills: 5 | Status: SHIPPED | OUTPATIENT
Start: 2017-09-17 | End: 2018-03-27 | Stop reason: SDUPTHER

## 2017-09-19 ENCOUNTER — PATIENT MESSAGE (OUTPATIENT)
Dept: INTERNAL MEDICINE | Facility: CLINIC | Age: 52
End: 2017-09-19

## 2017-09-19 DIAGNOSIS — Z86.73 HISTORY OF STROKE: ICD-10-CM

## 2017-09-19 DIAGNOSIS — I10 ESSENTIAL HYPERTENSION: ICD-10-CM

## 2017-09-19 NOTE — LETTER
September 20, 2017      Lori Burgess   [address]  [place] [address]             [facility] - [facility] Medicine  [facility]  [place] [address]  Phone: [phone]  Fax: [phone] Lori Burgess.    Was treated here on Tuesday 9/12/17.    May Return to work/school on Thursday 9/21/17.    No Restrictions.            Darya Clinton MD

## 2017-09-20 RX ORDER — NIFEDIPINE 60 MG/1
60 TABLET, EXTENDED RELEASE ORAL DAILY
Qty: 30 TABLET | Refills: 5 | Status: SHIPPED | OUTPATIENT
Start: 2017-09-20 | End: 2017-10-23 | Stop reason: SDUPTHER

## 2017-09-20 RX ORDER — METOPROLOL SUCCINATE 50 MG/1
50 TABLET, EXTENDED RELEASE ORAL DAILY
Qty: 30 TABLET | Refills: 5 | Status: SHIPPED | OUTPATIENT
Start: 2017-09-20 | End: 2017-10-23 | Stop reason: SDUPTHER

## 2017-09-20 RX ORDER — ATORVASTATIN CALCIUM 20 MG/1
20 TABLET, FILM COATED ORAL DAILY
Qty: 30 TABLET | Refills: 5 | Status: SHIPPED | OUTPATIENT
Start: 2017-09-20 | End: 2018-03-27 | Stop reason: SDUPTHER

## 2017-09-20 NOTE — TELEPHONE ENCOUNTER
Missing over a WEEK of work for a cold is extreme. Note rewritten this time, but don't make a habit of this.     Refills due October 30th have been ordered.

## 2017-10-18 ENCOUNTER — PATIENT MESSAGE (OUTPATIENT)
Dept: INTERNAL MEDICINE | Facility: CLINIC | Age: 52
End: 2017-10-18

## 2017-10-23 ENCOUNTER — LAB VISIT (OUTPATIENT)
Dept: LAB | Facility: HOSPITAL | Age: 52
End: 2017-10-23
Attending: INTERNAL MEDICINE
Payer: COMMERCIAL

## 2017-10-23 ENCOUNTER — OFFICE VISIT (OUTPATIENT)
Dept: INTERNAL MEDICINE | Facility: CLINIC | Age: 52
End: 2017-10-23
Payer: COMMERCIAL

## 2017-10-23 VITALS
WEIGHT: 277.81 LBS | DIASTOLIC BLOOD PRESSURE: 98 MMHG | SYSTOLIC BLOOD PRESSURE: 148 MMHG | HEIGHT: 71 IN | BODY MASS INDEX: 38.89 KG/M2 | HEART RATE: 64 BPM

## 2017-10-23 DIAGNOSIS — T50.2X5A DIURETIC-INDUCED HYPOKALEMIA: ICD-10-CM

## 2017-10-23 DIAGNOSIS — R60.0 EDEMA OF BOTH LEGS: ICD-10-CM

## 2017-10-23 DIAGNOSIS — R60.0 EDEMA OF BOTH LEGS: Primary | ICD-10-CM

## 2017-10-23 DIAGNOSIS — E87.6 DIURETIC-INDUCED HYPOKALEMIA: ICD-10-CM

## 2017-10-23 DIAGNOSIS — I10 ESSENTIAL HYPERTENSION: ICD-10-CM

## 2017-10-23 LAB
ANION GAP SERPL CALC-SCNC: 8 MMOL/L
BUN SERPL-MCNC: 7 MG/DL
CALCIUM SERPL-MCNC: 9.8 MG/DL
CHLORIDE SERPL-SCNC: 102 MMOL/L
CO2 SERPL-SCNC: 33 MMOL/L
CREAT SERPL-MCNC: 0.8 MG/DL
EST. GFR  (AFRICAN AMERICAN): >60 ML/MIN/1.73 M^2
EST. GFR  (NON AFRICAN AMERICAN): >60 ML/MIN/1.73 M^2
GLUCOSE SERPL-MCNC: 111 MG/DL
POTASSIUM SERPL-SCNC: 2.9 MMOL/L
SODIUM SERPL-SCNC: 143 MMOL/L
TSH SERPL DL<=0.005 MIU/L-ACNC: 1.41 UIU/ML

## 2017-10-23 PROCEDURE — 99213 OFFICE O/P EST LOW 20 MIN: CPT | Mod: S$GLB,,, | Performed by: INTERNAL MEDICINE

## 2017-10-23 PROCEDURE — 80048 BASIC METABOLIC PNL TOTAL CA: CPT

## 2017-10-23 PROCEDURE — 36415 COLL VENOUS BLD VENIPUNCTURE: CPT

## 2017-10-23 PROCEDURE — 84443 ASSAY THYROID STIM HORMONE: CPT

## 2017-10-23 PROCEDURE — 99999 PR PBB SHADOW E&M-EST. PATIENT-LVL III: CPT | Mod: PBBFAC,,, | Performed by: INTERNAL MEDICINE

## 2017-10-23 RX ORDER — METOPROLOL SUCCINATE 100 MG/1
100 TABLET, EXTENDED RELEASE ORAL DAILY
Qty: 30 TABLET | Refills: 5 | Status: SHIPPED | OUTPATIENT
Start: 2017-10-23 | End: 2018-03-05 | Stop reason: ALTCHOICE

## 2017-10-23 RX ORDER — NIFEDIPINE 30 MG/1
30 TABLET, EXTENDED RELEASE ORAL DAILY
Qty: 30 TABLET | Refills: 5 | Status: SHIPPED | OUTPATIENT
Start: 2017-10-23 | End: 2017-12-04 | Stop reason: DRUGHIGH

## 2017-10-23 RX ORDER — POTASSIUM CHLORIDE 1500 MG/1
TABLET, EXTENDED RELEASE ORAL
COMMUNITY
Start: 2017-09-06 | End: 2017-12-04 | Stop reason: ALTCHOICE

## 2017-10-23 NOTE — PROGRESS NOTES
Subjective:       Patient ID: Lori Burgess is a 52 y.o. female.    Chief Complaint: Leg Swelling    Last seen 5-6 weeks ago for f/u Hypertension. Blood pressure was fair but labs showed persistent hypokalemia on Chlorthalidone - this was discontinued, ordered Dyazide instead - hoping to keep the diuretic effect to manage chronic venous stasis, AND achieve a potassium balance. Returns for f/u reporting increased edema after making this change. Painless swelling in both legs, worse with prolonged standing - and she has been working some very long hours due to being short staffed. Stands for much of her time at work. Denies any increase in dietary sodium. No associated chest pain, palpitations, SOB. Evaluation for edema five months ago included normal BNP 50 and negative venous ultrasound for dvt. Symptoms are improved with use of compression stockings.     PMH: .   Hypertension since age 20.   Hyperlipidemia.   Left Lacunar CVA.  Pre-Diabetes, HbA1c 6.0% Sep. '17.  GERD.   Sigmoid Diverticulosis.   H/O Iron-def. Anemia.   Vitamin D insufficiency.  Obesity.     PSH: BTL, Hysterectomy (benign), ovaries intact. Bilateral Bartholin's cysts.     Mammogram normal 3/17, Pap normal 3/17. Colonoscopy normal 5/15. Eye exam 2016 at [facility]. Flu shot . Labs : CBC normal, CMP normal except Potassium 3.1, Vit D 19, TChol 130, TG 85, HDL 33, LDL 80, Hep C negative.     Social: No tobacco or alcohol. , three adult children all live locally, six grandchildren.  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     FMH: Hypertension in both parents and siblings. Father  age 50 with complications of Diabetes including heart failure, kidney failure, bilateral amputee. Mother  age 55 with Colon cancer. Heart disease and Diabetes in paternal aunt also.     Allergies: ACE inhibitors, ARBs, Methyldopa, Clonidine.     Medications: list reviewed and reconciled.           Review of Systems     Objective:    /98, Pulse 64, Wt 277.8 lbs (unchanged)  Physical Exam   Constitutional: No distress.   Obese woman ambulatory with a normal gait in no distress.   HENT:   Nose: Nose normal.   Mouth/Throat: Oropharynx is clear and moist.   Neck: Normal range of motion. Neck supple. No JVD present.   Cardiovascular: Normal rate, regular rhythm, normal heart sounds and intact distal pulses.    Pulmonary/Chest: Effort normal and breath sounds normal. No respiratory distress. She has no wheezes. She has no rales.   Musculoskeletal: Normal range of motion. She exhibits no tenderness.   Trace pitting pretibial edema both lower legs, no stasis dermatitis or ulceration, non-tender.    Skin: Skin is warm and dry. She is not diaphoretic.       Assessment:       1. Edema of both legs    2. Essential hypertension    3. Diuretic-induced hypokalemia        Plan:       Edema of both legs - chronic venous stasis        -     Continue compression stockings, low sodium diet, elevate while at rest.   -     TSH today (last checked two years ago - normal)    Essential hypertension  -     Due to edema, Decrease Nifedipine (PROCARDIA-XL) from 60 to 30 MG (OSM) 24 hr tablet; Take 1 tablet (30 mg total) by mouth once daily.  Dispense: 30 tablet; Refill: 5  -     To control blood pressure, Increase Metoprolol succinate (TOPROL-XL) from 50 to 100 MG 24 hr tablet; Take 1 tablet (100 mg total) by mouth once daily.  Dispense: 30 tablet; Refill: 5  -     Continue Dyazide 37.5/25mg the same.    Diuretic-induced hypokalemia  -     Basic metabolic panel today.

## 2017-12-04 ENCOUNTER — OFFICE VISIT (OUTPATIENT)
Dept: INTERNAL MEDICINE | Facility: CLINIC | Age: 52
End: 2017-12-04
Payer: COMMERCIAL

## 2017-12-04 VITALS
WEIGHT: 275.63 LBS | DIASTOLIC BLOOD PRESSURE: 90 MMHG | SYSTOLIC BLOOD PRESSURE: 140 MMHG | BODY MASS INDEX: 38.59 KG/M2 | HEART RATE: 60 BPM | HEIGHT: 71 IN

## 2017-12-04 DIAGNOSIS — E87.6 DIURETIC-INDUCED HYPOKALEMIA: ICD-10-CM

## 2017-12-04 DIAGNOSIS — T50.2X5A DIURETIC-INDUCED HYPOKALEMIA: ICD-10-CM

## 2017-12-04 DIAGNOSIS — E78.5 HYPERLIPIDEMIA, UNSPECIFIED HYPERLIPIDEMIA TYPE: ICD-10-CM

## 2017-12-04 DIAGNOSIS — I10 ESSENTIAL HYPERTENSION: Primary | ICD-10-CM

## 2017-12-04 DIAGNOSIS — E66.01 SEVERE OBESITY (BMI 35.0-39.9) WITH COMORBIDITY: ICD-10-CM

## 2017-12-04 DIAGNOSIS — R73.03 PRE-DIABETES: ICD-10-CM

## 2017-12-04 DIAGNOSIS — R07.9 CHEST PAIN, UNSPECIFIED TYPE: ICD-10-CM

## 2017-12-04 PROCEDURE — 99999 PR PBB SHADOW E&M-EST. PATIENT-LVL III: CPT | Mod: PBBFAC,,, | Performed by: INTERNAL MEDICINE

## 2017-12-04 PROCEDURE — 93010 ELECTROCARDIOGRAM REPORT: CPT | Mod: S$GLB,,, | Performed by: INTERNAL MEDICINE

## 2017-12-04 PROCEDURE — 99214 OFFICE O/P EST MOD 30 MIN: CPT | Mod: S$GLB,,, | Performed by: INTERNAL MEDICINE

## 2017-12-04 PROCEDURE — 93005 ELECTROCARDIOGRAM TRACING: CPT | Mod: S$GLB,,, | Performed by: INTERNAL MEDICINE

## 2017-12-04 RX ORDER — NIFEDIPINE 60 MG/1
60 TABLET, EXTENDED RELEASE ORAL DAILY
Qty: 30 TABLET | Refills: 2 | Status: SHIPPED | OUTPATIENT
Start: 2017-12-04 | End: 2018-03-27 | Stop reason: SDUPTHER

## 2017-12-04 RX ORDER — POTASSIUM CHLORIDE 750 MG/1
20 TABLET, EXTENDED RELEASE ORAL DAILY
Qty: 60 TABLET | Refills: 5 | Status: SHIPPED | OUTPATIENT
Start: 2017-12-04 | End: 2018-03-27 | Stop reason: DRUGHIGH

## 2017-12-04 NOTE — PROGRESS NOTES
"Subjective:       Patient ID: Lori Burgess is a 52 y.o. female.    Chief Complaint: Hypertension    Last seen 6 weeks ago for f/u Hypertension. Blood pressure was not controlled, but she was having more swelling on 60mg Nifedipine so this was decreased to 30mg and Metoprolol XL increased from 50 to 100mg, Dyazide continued the same. Labs indicated that hypokalemia persists and she will need KCl refilled. Returns compliant with meds as prescribed, edema is less but blood pressure is not well controlled. Actually taking Metoprolol 50mg two tabs daily because pharmacy has not acknowledged the new order for the higher dose. Home BP monitoring is reportedly the same 140/90 as it is here today.     C/O episode of chest pain five nights ago upon returning home from work. Had not eaten dinner, last meal prior was oatmeal for lunch. Describes a severe pressure with heaviness in the substernal area "like someone sitting on my chest" (and makes the fist sign), with associated SOB that lasted for hours until she finally fell asleep and woke the next morning feeling better. States she did not seek emergency medical services because she was afraid to get bad news. But agrees to a work up now. No palpitations, N/V, diaphoresis, radiation of pain, dizziness or syncope. No cough, sputum, hemoptysis.     PMH: .   Hypertension since age 20.   Hyperlipidemia.   Left Lacunar CVA.  Pre-Diabetes, HbA1c 6.0% Sep. '17.  GERD.   Sigmoid Diverticulosis.   H/O Iron-def. Anemia.   Vitamin D insufficiency.  Obesity.     PSH: BTL, Hysterectomy (benign), ovaries intact. Bilateral Bartholin's cysts.     Mammogram normal 3/17, Pap normal 3/17. Colonoscopy normal 5/15. Eye exam Fall  at [facility]. Flu shot 2013 - declines repeat. Labs : CBC normal, CMP normal except Potassium 3.1, Vit D 19, TChol 130, TG 85, HDL 33, LDL 80, Hep C negative.     Social: No tobacco or alcohol. , three adult children all live locally, six " "grandchildren.  working in the long term system.     FMH: Hypertension in both parents and siblings. Father  age 50 with complications of Diabetes including heart failure, kidney failure, bilateral amputee. Mother  age 55 with Colon cancer. Heart disease and Diabetes in paternal aunt also.     Allergies: ACE inhibitors, ARBs, Methyldopa, Clonidine.     Medications: list reviewed and reconciled. Takes aspirin daily.            Review of Systems   Constitutional: Negative for chills, diaphoresis and fever.   Respiratory: Positive for shortness of breath. Negative for cough and wheezing.    Cardiovascular: Positive for chest pain. Negative for palpitations.   Gastrointestinal: Negative for abdominal pain, nausea and vomiting.   Neurological: Negative for dizziness, syncope and headaches.       Objective:    /90, unchanged upon repeat, Pulse 60, Ht 5' 11", Wt 275.6 lbs, BMI=38.44  Physical Exam   Constitutional: She is oriented to person, place, and time. She appears well-developed and well-nourished. No distress.   HENT:   Nose: Nose normal.   Mouth/Throat: Oropharynx is clear and moist.   Eyes: Conjunctivae are normal. No scleral icterus.   Neck: Normal range of motion. Neck supple. No JVD present.   Cardiovascular: Normal rate, regular rhythm and normal heart sounds.    Pulmonary/Chest: Effort normal and breath sounds normal. No respiratory distress. She has no wheezes. She has no rales. She exhibits no tenderness.   Musculoskeletal: Normal range of motion.   Trace pretibial edema distal calves.   Neurological: She is alert and oriented to person, place, and time. No cranial nerve deficit. Coordination normal.   Skin: Skin is warm and dry. She is not diaphoretic.       EKG: NSR, 60 bpm, LVH, anterolateral T wave abnl appears unchanged from 2015.     Assessment:       1. Essential hypertension    2. Chest pain, unspecified type    3. Diuretic-induced hypokalemia    4. " Hyperlipidemia, unspecified hyperlipidemia type    5. Pre-diabetes    6. Severe obesity (BMI 35.0-39.9) with comorbidity        Plan:       Essential hypertension not at goal  -     Increase NIFEdipine (PROCARDIA-XL) back to 60 MG (OSM) 24 hr tablet; Take 1 tablet (60 mg total) by mouth once daily.  Dispense: 30 tablet; Refill: 2  Continue Metoprolol and Dyazide the same.     Chest pain, unspecified type  -     IN OFFICE EKG 12-LEAD (to Muse)  -     Exercise stress echo; Future    Diuretic-induced hypokalemia  -     potassium chloride (KLOR-CON) 10 MEQ TbSR; Take 2 tablets (20 mEq total) by mouth once daily.  Dispense: 60 tablet; Refill: 5    Hyperlipidemia, unspecified hyperlipidemia type - controlled, continue Atorvastatin.     Pre-diabetes - counseled on diet and exercise for management.     Severe obesity (BMI 35.0-39.9) with comorbidity - see above.     She declines a Flu shot today.

## 2017-12-12 ENCOUNTER — PATIENT MESSAGE (OUTPATIENT)
Dept: INTERNAL MEDICINE | Facility: CLINIC | Age: 52
End: 2017-12-12

## 2017-12-12 ENCOUNTER — HOSPITAL ENCOUNTER (OUTPATIENT)
Dept: CARDIOLOGY | Facility: CLINIC | Age: 52
Discharge: HOME | End: 2017-12-12
Attending: INTERNAL MEDICINE
Payer: COMMERCIAL

## 2017-12-12 DIAGNOSIS — R07.9 CHEST PAIN, UNSPECIFIED TYPE: ICD-10-CM

## 2017-12-12 LAB — RETIRED EF AND QEF - SEE NOTES: 60 (ref 55–65)

## 2017-12-12 PROCEDURE — 93321 DOPPLER ECHO F-UP/LMTD STD: CPT | Mod: S$GLB,,, | Performed by: INTERNAL MEDICINE

## 2017-12-12 PROCEDURE — 93351 STRESS TTE COMPLETE: CPT | Mod: S$GLB,,, | Performed by: INTERNAL MEDICINE

## 2018-03-05 ENCOUNTER — OFFICE VISIT (OUTPATIENT)
Dept: INTERNAL MEDICINE | Facility: CLINIC | Age: 53
End: 2018-03-05
Payer: COMMERCIAL

## 2018-03-05 ENCOUNTER — HOSPITAL ENCOUNTER (OUTPATIENT)
Dept: RADIOLOGY | Facility: HOSPITAL | Age: 53
Discharge: HOME | End: 2018-03-05
Attending: INTERNAL MEDICINE
Payer: COMMERCIAL

## 2018-03-05 VITALS
DIASTOLIC BLOOD PRESSURE: 98 MMHG | SYSTOLIC BLOOD PRESSURE: 140 MMHG | BODY MASS INDEX: 38.11 KG/M2 | HEART RATE: 74 BPM | WEIGHT: 272.25 LBS | OXYGEN SATURATION: 97 % | HEIGHT: 71 IN

## 2018-03-05 DIAGNOSIS — M54.12 RADICULITIS OF LEFT CERVICAL REGION: ICD-10-CM

## 2018-03-05 DIAGNOSIS — E66.01 SEVERE OBESITY (BMI 35.0-39.9) WITH COMORBIDITY: ICD-10-CM

## 2018-03-05 DIAGNOSIS — I10 ESSENTIAL HYPERTENSION: Primary | ICD-10-CM

## 2018-03-05 DIAGNOSIS — R73.03 PRE-DIABETES: ICD-10-CM

## 2018-03-05 DIAGNOSIS — E78.5 HYPERLIPIDEMIA, UNSPECIFIED HYPERLIPIDEMIA TYPE: ICD-10-CM

## 2018-03-05 LAB
BILIRUB UR QL STRIP: NEGATIVE
CLARITY UR REFRACT.AUTO: CLEAR
COLOR UR AUTO: YELLOW
GLUCOSE UR QL STRIP: NEGATIVE
HGB UR QL STRIP: NEGATIVE
KETONES UR QL STRIP: NEGATIVE
LEUKOCYTE ESTERASE UR QL STRIP: NEGATIVE
NITRITE UR QL STRIP: NEGATIVE
PH UR STRIP: 7 [PH] (ref 5–8)
PROT UR QL STRIP: NEGATIVE
SP GR UR STRIP: 1.01 (ref 1–1.03)
URN SPEC COLLECT METH UR: NORMAL
UROBILINOGEN UR STRIP-ACNC: NEGATIVE EU/DL

## 2018-03-05 PROCEDURE — 99999 PR PBB SHADOW E&M-EST. PATIENT-LVL III: CPT | Mod: PBBFAC,,, | Performed by: INTERNAL MEDICINE

## 2018-03-05 PROCEDURE — 72050 X-RAY EXAM NECK SPINE 4/5VWS: CPT | Mod: 26,,, | Performed by: RADIOLOGY

## 2018-03-05 PROCEDURE — 3080F DIAST BP >= 90 MM HG: CPT | Mod: S$GLB,,, | Performed by: INTERNAL MEDICINE

## 2018-03-05 PROCEDURE — 99214 OFFICE O/P EST MOD 30 MIN: CPT | Mod: S$GLB,,, | Performed by: INTERNAL MEDICINE

## 2018-03-05 PROCEDURE — 3077F SYST BP >= 140 MM HG: CPT | Mod: S$GLB,,, | Performed by: INTERNAL MEDICINE

## 2018-03-05 PROCEDURE — 72050 X-RAY EXAM NECK SPINE 4/5VWS: CPT | Mod: TC

## 2018-03-05 PROCEDURE — 81003 URINALYSIS AUTO W/O SCOPE: CPT

## 2018-03-05 RX ORDER — CARVEDILOL 25 MG/1
25 TABLET ORAL 2 TIMES DAILY WITH MEALS
Qty: 60 TABLET | Refills: 3 | Status: SHIPPED | OUTPATIENT
Start: 2018-03-05 | End: 2018-08-01 | Stop reason: SDUPTHER

## 2018-03-05 NOTE — PROGRESS NOTES
Subjective:       Patient ID: Lori Burgess is a 52 y.o. female.    Chief Complaint: Follow-up (3mo.) and Arm Pain (Left arm)    Last seen 3 months ago. Hypertension was not at goal, increased Nifedipine XL from 30 to 60mg, in addition to Metoprolol XL 100mg and Dyazide 37.5/25mg daily. She complained of chest pain with SOB. EKG showed T wave abnormalities. Exercise Stress Echo done 17 was negative for ischemia, normal LV function. Returns for f/u as scheduled taking all meds as prescribed. Home BP ranges mostly 130's/80-90 per history, no log for review.    PMH: .   Hypertension since age 20.   Hyperlipidemia.   Left Lacunar CVA.  Pre-Diabetes, HbA1c 6.0% Sep. '17.  GERD.   Sigmoid Diverticulosis.   H/O Iron-def. Anemia.   Vitamin D insufficiency.  Obesity.     PSH: BTL, Hysterectomy (benign), ovaries intact. Bilateral Bartholin's cysts.     Mammogram normal 3/17, Pap normal 3/17. Colonoscopy normal 5/15. Eye exam 2016 at [facility]. Flu shot  - declines repeat. Labs : CBC normal, CMP normal except Potassium 3.1, Vit D 19, TChol 130, TG 85, HDL 33, LDL 80, Hep C negative.     Social: No tobacco or alcohol. , three adult children all live locally, six grandchildren.  working in the [employer] system.     FMH: Hypertension in both parents and siblings. Father  age 50 with complications of Diabetes including heart failure, kidney failure, bilateral amputee. Mother  age 55 with Colon cancer. Heart disease and Diabetes in paternal aunt also.     Allergies: ACE inhibitors, ARBs, Methyldopa, Clonidine.     Medications: list reviewed and reconciled. Aleve prn.            Review of Systems   Constitutional: Negative for chills, diaphoresis, fatigue, fever and unexpected weight change.   HENT: Negative for congestion, rhinorrhea, sore throat and trouble swallowing.    Eyes: Negative for pain and visual disturbance.   Respiratory: Negative for cough, chest  "tightness and shortness of breath.    Cardiovascular: Negative for chest pain and palpitations.        Mild edema lower legs persists.   Gastrointestinal: Negative for abdominal pain, diarrhea, nausea and vomiting.   Genitourinary: Positive for frequency. Negative for dysuria, hematuria and urgency.   Musculoskeletal: Positive for neck pain.        New c/o left neck pain radiating out to the left trapezius muscle and all the way down her left arm x 2 weeks. Denies trauma, but has been doing a lot of painting inside her new house which requires her to look up a LOT. No associated weakness, numbness or tingling, but pain gets severe. Using Aleve with some relief, declines any kind of Rx analgesic today.   Skin: Negative for color change and rash.   Neurological: Negative for dizziness, syncope, weakness and headaches.       Objective:    /98, Pulse 74, Ht 5' 11", Wt 272 lbs (from 275), BMI=38  Physical Exam   Constitutional: She is oriented to person, place, and time. She appears well-developed and well-nourished. No distress.   HENT:   Nose: Nose normal.   Mouth/Throat: Oropharynx is clear and moist.   Eyes: Conjunctivae and EOM are normal.   Neck: No JVD present. No thyromegaly present.   No point tenderness along the spine, no spinal deformity, no muscle spasm. Full range of motion with increased pain on left with movement.    Cardiovascular: Normal rate, regular rhythm and normal heart sounds.    Pulmonary/Chest: Effort normal and breath sounds normal. No respiratory distress. She has no wheezes. She has no rales.   Musculoskeletal:   Trace edema distal calves persists, no stasis dermatitis or ulceration. Left arm is normal in appearance with no edema or tenderness, left shoulder, elbow, wrist and hand are normal - no arthralgias in the limb.   Lymphadenopathy:     She has no cervical adenopathy.   Neurological: She is alert and oriented to person, place, and time. No cranial nerve deficit. She exhibits " normal muscle tone. Coordination normal.   Skin: Skin is warm and dry. No rash noted. She is not diaphoretic.   Psychiatric: She has a normal mood and affect. Her behavior is normal.       Assessment:       1. Essential hypertension    2. Hyperlipidemia, unspecified hyperlipidemia type    3. Pre-diabetes    4. Severe obesity (BMI 35.0-39.9) with comorbidity    5. Radiculitis of left cervical region        Plan:       Essential hypertension not at goal  -     CBC auto differential; Future; Expected date: 03/05/2018  -     Comprehensive metabolic panel; Future; Expected date: 03/05/2018  -     Urinalysis  -     Change Metoprolol to Carvedilol (COREG) 25 MG tablet; Take 1 tablet (25 mg total) by mouth 2 (two) times daily with meals.  Dispense: 60 tablet; Refill: 3  Continue Nifedipine and Dyazide the same.    Hyperlipidemia, unspecified hyperlipidemia type  -     Lipid panel; Future; Expected date: 03/05/2018    Pre-diabetes  -     Hemoglobin A1c; Future; Expected date: 03/05/2018    Severe obesity (BMI 35.0-39.9) with comorbidity        -     Counseled on diet and exercise for weight reduction.    Radiculitis of left cervical region  -     X-Ray Cervical Spine Complete 5 view; Future; Expected date: 03/05/2018  -     She declines Gabapentin or pain meds, continue Aleve, consider a course of oral steroids upon review of labs being done today.     Mammogram due later this month, already ordered by Gyn.

## 2018-03-07 ENCOUNTER — PATIENT MESSAGE (OUTPATIENT)
Dept: INTERNAL MEDICINE | Facility: CLINIC | Age: 53
End: 2018-03-07

## 2018-03-27 ENCOUNTER — PATIENT MESSAGE (OUTPATIENT)
Dept: INTERNAL MEDICINE | Facility: CLINIC | Age: 53
End: 2018-03-27

## 2018-03-27 DIAGNOSIS — T50.2X5A DIURETIC-INDUCED HYPOKALEMIA: ICD-10-CM

## 2018-03-27 DIAGNOSIS — E87.6 DIURETIC-INDUCED HYPOKALEMIA: ICD-10-CM

## 2018-03-27 DIAGNOSIS — Z86.73 HISTORY OF STROKE: ICD-10-CM

## 2018-03-27 DIAGNOSIS — I10 ESSENTIAL HYPERTENSION: ICD-10-CM

## 2018-03-27 RX ORDER — POTASSIUM CHLORIDE 750 MG/1
20 TABLET, EXTENDED RELEASE ORAL 2 TIMES DAILY
Qty: 120 TABLET | Refills: 5 | Status: SHIPPED | OUTPATIENT
Start: 2018-03-27 | End: 2018-12-05 | Stop reason: SDUPTHER

## 2018-03-27 RX ORDER — TRIAMTERENE AND HYDROCHLOROTHIAZIDE 37.5; 25 MG/1; MG/1
1 CAPSULE ORAL EVERY MORNING
Qty: 30 CAPSULE | Refills: 5 | Status: SHIPPED | OUTPATIENT
Start: 2018-03-27 | End: 2018-08-01 | Stop reason: SDUPTHER

## 2018-03-27 RX ORDER — ATORVASTATIN CALCIUM 20 MG/1
20 TABLET, FILM COATED ORAL DAILY
Qty: 30 TABLET | Refills: 5 | Status: SHIPPED | OUTPATIENT
Start: 2018-03-27 | End: 2018-08-01 | Stop reason: SDUPTHER

## 2018-03-27 RX ORDER — NIFEDIPINE 60 MG/1
60 TABLET, EXTENDED RELEASE ORAL DAILY
Qty: 30 TABLET | Refills: 5 | Status: SHIPPED | OUTPATIENT
Start: 2018-03-27 | End: 2018-08-01 | Stop reason: DRUGHIGH

## 2018-06-16 ENCOUNTER — HOSPITAL ENCOUNTER (EMERGENCY)
Facility: HOSPITAL | Age: 53
Discharge: HOME | End: 2018-06-16
Attending: EMERGENCY MEDICINE
Payer: COMMERCIAL

## 2018-06-16 VITALS
SYSTOLIC BLOOD PRESSURE: 172 MMHG | BODY MASS INDEX: 37.22 KG/M2 | WEIGHT: 265.88 LBS | TEMPERATURE: 98 F | HEART RATE: 66 BPM | OXYGEN SATURATION: 97 % | DIASTOLIC BLOOD PRESSURE: 83 MMHG | HEIGHT: 71 IN | RESPIRATION RATE: 16 BRPM

## 2018-06-16 DIAGNOSIS — R10.9 ACUTE LEFT FLANK PAIN: Primary | ICD-10-CM

## 2018-06-16 LAB
ALBUMIN SERPL BCP-MCNC: 3.3 G/DL
ALP SERPL-CCNC: 86 U/L
ALT SERPL W/O P-5'-P-CCNC: 12 U/L
ANION GAP SERPL CALC-SCNC: 10 MMOL/L
AST SERPL-CCNC: 12 U/L
BACTERIA #/AREA URNS AUTO: NORMAL /HPF
BASOPHILS # BLD AUTO: 0.03 K/UL
BASOPHILS NFR BLD: 0.5 %
BILIRUB SERPL-MCNC: 0.8 MG/DL
BILIRUB UR QL STRIP: NEGATIVE
BUN SERPL-MCNC: 10 MG/DL
CALCIUM SERPL-MCNC: 9.4 MG/DL
CHLORIDE SERPL-SCNC: 103 MMOL/L
CLARITY UR REFRACT.AUTO: CLEAR
CO2 SERPL-SCNC: 30 MMOL/L
COLOR UR AUTO: YELLOW
CREAT SERPL-MCNC: 0.7 MG/DL
DIFFERENTIAL METHOD: ABNORMAL
EOSINOPHIL # BLD AUTO: 0.1 K/UL
EOSINOPHIL NFR BLD: 1.6 %
ERYTHROCYTE [DISTWIDTH] IN BLOOD BY AUTOMATED COUNT: 16.7 %
EST. GFR  (AFRICAN AMERICAN): >60 ML/MIN/1.73 M^2
EST. GFR  (NON AFRICAN AMERICAN): >60 ML/MIN/1.73 M^2
GLUCOSE SERPL-MCNC: 105 MG/DL
GLUCOSE UR QL STRIP: NEGATIVE
HCT VFR BLD AUTO: 34.2 %
HGB BLD-MCNC: 10.9 G/DL
HGB UR QL STRIP: NEGATIVE
IMM GRANULOCYTES # BLD AUTO: 0.01 K/UL
IMM GRANULOCYTES NFR BLD AUTO: 0.2 %
KETONES UR QL STRIP: NEGATIVE
LEUKOCYTE ESTERASE UR QL STRIP: ABNORMAL
LYMPHOCYTES # BLD AUTO: 1.8 K/UL
LYMPHOCYTES NFR BLD: 32.6 %
MCH RBC QN AUTO: 23.5 PG
MCHC RBC AUTO-ENTMCNC: 31.9 G/DL
MCV RBC AUTO: 74 FL
MICROSCOPIC COMMENT: NORMAL
MONOCYTES # BLD AUTO: 0.4 K/UL
MONOCYTES NFR BLD: 6.7 %
NEUTROPHILS # BLD AUTO: 3.2 K/UL
NEUTROPHILS NFR BLD: 58.4 %
NITRITE UR QL STRIP: NEGATIVE
NRBC BLD-RTO: 0 /100 WBC
PH UR STRIP: 6 [PH] (ref 5–8)
PLATELET # BLD AUTO: 239 K/UL
PMV BLD AUTO: 11.5 FL
POTASSIUM SERPL-SCNC: 3.1 MMOL/L
PROT SERPL-MCNC: 7.6 G/DL
PROT UR QL STRIP: NEGATIVE
RBC # BLD AUTO: 4.64 M/UL
RBC #/AREA URNS AUTO: 1 /HPF (ref 0–4)
SODIUM SERPL-SCNC: 143 MMOL/L
SP GR UR STRIP: 1.02 (ref 1–1.03)
SQUAMOUS #/AREA URNS AUTO: 1 /HPF
URN SPEC COLLECT METH UR: ABNORMAL
UROBILINOGEN UR STRIP-ACNC: 2 EU/DL
WBC # BLD AUTO: 5.55 K/UL
WBC #/AREA URNS AUTO: 2 /HPF (ref 0–5)

## 2018-06-16 PROCEDURE — 80053 COMPREHEN METABOLIC PANEL: CPT

## 2018-06-16 PROCEDURE — 99285 EMERGENCY DEPT VISIT HI MDM: CPT | Mod: ,,, | Performed by: EMERGENCY MEDICINE

## 2018-06-16 PROCEDURE — 63600175 PHARM REV CODE 636 W HCPCS: Performed by: EMERGENCY MEDICINE

## 2018-06-16 PROCEDURE — 81001 URINALYSIS AUTO W/SCOPE: CPT

## 2018-06-16 PROCEDURE — 96375 TX/PRO/DX INJ NEW DRUG ADDON: CPT

## 2018-06-16 PROCEDURE — 96374 THER/PROPH/DIAG INJ IV PUSH: CPT

## 2018-06-16 PROCEDURE — 99284 EMERGENCY DEPT VISIT MOD MDM: CPT | Mod: 25

## 2018-06-16 PROCEDURE — 25000003 PHARM REV CODE 250: Performed by: EMERGENCY MEDICINE

## 2018-06-16 PROCEDURE — 85025 COMPLETE CBC W/AUTO DIFF WBC: CPT

## 2018-06-16 RX ORDER — POTASSIUM CHLORIDE 20 MEQ/15ML
20 SOLUTION ORAL
Status: COMPLETED | OUTPATIENT
Start: 2018-06-16 | End: 2018-06-16

## 2018-06-16 RX ORDER — ONDANSETRON 2 MG/ML
4 INJECTION INTRAMUSCULAR; INTRAVENOUS
Status: COMPLETED | OUTPATIENT
Start: 2018-06-16 | End: 2018-06-16

## 2018-06-16 RX ORDER — MORPHINE SULFATE 4 MG/ML
4 INJECTION, SOLUTION INTRAMUSCULAR; INTRAVENOUS
Status: COMPLETED | OUTPATIENT
Start: 2018-06-16 | End: 2018-06-16

## 2018-06-16 RX ADMIN — POTASSIUM CHLORIDE 20 MEQ: 20 SOLUTION ORAL at 04:06

## 2018-06-16 RX ADMIN — ONDANSETRON 4 MG: 2 INJECTION, SOLUTION INTRAMUSCULAR; INTRAVENOUS at 03:06

## 2018-06-16 RX ADMIN — MORPHINE SULFATE 4 MG: 4 INJECTION INTRAVENOUS at 03:06

## 2018-06-16 NOTE — ED PROVIDER NOTES
Encounter Date: 6/16/2018    SCRIBE #1 NOTE: I, Lexus Donna, am scribing for, and in the presence of, Dr. Goldstein.       History     Chief Complaint   Patient presents with    Flank Pain     L flank pain, denies hx of stones     Time seen by provider: 2:49 PM    This is a 52 y.o. female with medical conditions including HTN, HLD, stroke who presents with complaint of left flank pain onset at midnight last night. It onset suddenly and woke her up. She generally does not have back pain and has never had anything like this before. She denies and nausea or vomiting or abdominal pain. She denies any falls or trauma to the area. The patient denies any fever, dysuria, hematuria, or any other associated symptoms. The patient had no relief with aleve today. The pain is exacerbated by movement and describes that she feels like she cannot get comfortable.       The history is provided by the patient.     Review of patient's allergies indicates:   Allergen Reactions    Ace inhibitors Swelling    Benicar [olmesartan] Swelling    Methyldopa Other (See Comments)     sleepy    Clonidine Other (See Comments)     sleepy     Past Medical History:   Diagnosis Date    Anemia     Diverticulosis     History of gastroesophageal reflux (GERD)     Hyperlipidemia     Hypertension     Stroke 2015    Trauma 1998    Fractured Right ankle     Past Surgical History:   Procedure Laterality Date    BARTHOLIN GLAND CYST EXCISION Bilateral     HYSTERECTOMY  10/2006    Laparoscopic Supracervical Hysterectomy    TUBAL LIGATION       Family History   Problem Relation Age of Onset    Colon cancer Mother 55    Hypertension Mother     Diabetes Father     Hypertension Father     Hypertension Brother     Hypertension Sister     Heart disease Neg Hx     Heart attack Neg Hx     Breast cancer Neg Hx     Ovarian cancer Neg Hx      Social History   Substance Use Topics    Smoking status: Never Smoker    Smokeless tobacco: Never Used     Alcohol use Yes      Comment: rarely     Review of Systems   Constitutional: Negative for chills and fever.   HENT: Negative for facial swelling and nosebleeds.    Eyes: Negative for visual disturbance.   Respiratory: Negative for shortness of breath.    Cardiovascular: Negative for chest pain.   Gastrointestinal: Negative for abdominal pain, nausea and vomiting.   Genitourinary: Positive for flank pain. Negative for dysuria and hematuria.   Musculoskeletal: Negative for gait problem.   Skin: Negative for rash.   Neurological: Negative for speech difficulty.       Physical Exam     Initial Vitals [06/16/18 1404]   BP Pulse Resp Temp SpO2   (!) 172/83 66 16 97.8 °F (36.6 °C) 97 %      MAP       --         Physical Exam    Nursing note and vitals reviewed.  Constitutional: She appears well-developed and well-nourished. She is not diaphoretic. No distress.   HENT:   Head: Normocephalic and atraumatic.   Eyes: EOM are normal. Pupils are equal, round, and reactive to light.   Neck: Normal range of motion. Neck supple.   Cardiovascular: Normal rate and regular rhythm.   Pulmonary/Chest: Breath sounds normal. No respiratory distress.   Abdominal: Soft. She exhibits no distension. There is no tenderness.   +left CVA tenderness    Musculoskeletal: Normal range of motion. She exhibits no edema.   Neurological: She is alert and oriented to person, place, and time. She has normal strength.   Skin: Skin is warm and dry.         ED Course   Procedures  Labs Reviewed   URINALYSIS, REFLEX TO URINE CULTURE - Abnormal; Notable for the following:        Result Value    Leukocytes, UA 1+ (*)     All other components within normal limits    Narrative:     Preferred Collection Type->Urine, Clean Catch   CBC W/ AUTO DIFFERENTIAL - Abnormal; Notable for the following:     Hemoglobin 10.9 (*)     Hematocrit 34.2 (*)     MCV 74 (*)     MCH 23.5 (*)     MCHC 31.9 (*)     RDW 16.7 (*)     All other components within normal limits    COMPREHENSIVE METABOLIC PANEL - Abnormal; Notable for the following:     Potassium 3.1 (*)     CO2 30 (*)     Albumin 3.3 (*)     All other components within normal limits   URINALYSIS MICROSCOPIC    Narrative:     Preferred Collection Type->Urine, Clean Catch          CT Renal Stone Study ABD Pelvis WO   Final Result      1. No acute CT abnormality within the abdomen or pelvis to correlate with flank pain, no findings to suggest obstructive uropathy or nephrolithiasis.   2. Stable low attenuating lesion within the left hepatic lobe, and several additional stable findings as above.         Electronically signed by: Tucker Blue MD   Date:    06/16/2018   Time:    15:50           Medical Decision Making:   History:   Old Medical Records: I decided to obtain old medical records.  Initial Assessment:   51 y/o F acute onset Rt flank pain  Ddx: renal colic, musculoskeletal pain/spasm, less likely pyelonephritis, hydronephroisis, unlikely AAA, shingles  Routine blood work, UA  IVF hydration, pain control  CT stone protocal            Scribe Attestation:   Scribe #1: I performed the above scribed service and the documentation accurately describes the services I performed. I attest to the accuracy of the note.    Attending Attestation:             Attending ED Notes:   3:58 PM  Blood work is unremarkable other then a mild hypokalemia. CT scan shows no kidney stone, no source for abdominal pain noted. I have discussed results of blood work and CT scan with pt. She feels better at this time. Have discussedf alternative diagnosis. Routine return precautions provided.             Clinical Impression:   The encounter diagnosis was Acute left flank pain.      Disposition:   Disposition: Discharged  Condition: Stable                        Kassie Goldstein MD  06/17/18 8605

## 2018-06-16 NOTE — DISCHARGE INSTRUCTIONS
Drink plenty of fluids to stay hydrated.  Tylenol or motrin if needed for pain  Follow up with your doctor in one week for re-evaluation and blood pressure check.  Return to ED for fevers, vomiting, worsening pain or any other concerns.

## 2018-07-14 ENCOUNTER — HOSPITAL ENCOUNTER (EMERGENCY)
Facility: HOSPITAL | Age: 53
Discharge: HOME | End: 2018-07-14
Attending: EMERGENCY MEDICINE
Payer: COMMERCIAL

## 2018-07-14 VITALS
OXYGEN SATURATION: 96 % | HEIGHT: 71 IN | TEMPERATURE: 99 F | BODY MASS INDEX: 36.68 KG/M2 | SYSTOLIC BLOOD PRESSURE: 143 MMHG | WEIGHT: 262 LBS | DIASTOLIC BLOOD PRESSURE: 84 MMHG | RESPIRATION RATE: 18 BRPM | HEART RATE: 74 BPM

## 2018-07-14 DIAGNOSIS — N93.9 VAGINAL BLEEDING: Primary | ICD-10-CM

## 2018-07-14 LAB
ALBUMIN SERPL BCP-MCNC: 3.5 G/DL
ALP SERPL-CCNC: 97 U/L
ALT SERPL W/O P-5'-P-CCNC: 13 U/L
ANION GAP SERPL CALC-SCNC: 9 MMOL/L
AST SERPL-CCNC: 13 U/L
BASOPHILS # BLD AUTO: 0.03 K/UL
BASOPHILS NFR BLD: 0.4 %
BILIRUB SERPL-MCNC: 0.7 MG/DL
BILIRUB UR QL STRIP: NEGATIVE
BUN SERPL-MCNC: 9 MG/DL
CALCIUM SERPL-MCNC: 10.2 MG/DL
CHLORIDE SERPL-SCNC: 103 MMOL/L
CLARITY UR REFRACT.AUTO: CLEAR
CO2 SERPL-SCNC: 28 MMOL/L
COLOR UR AUTO: YELLOW
CREAT SERPL-MCNC: 0.9 MG/DL
DIFFERENTIAL METHOD: ABNORMAL
EOSINOPHIL # BLD AUTO: 0.2 K/UL
EOSINOPHIL NFR BLD: 2 %
ERYTHROCYTE [DISTWIDTH] IN BLOOD BY AUTOMATED COUNT: 16.8 %
EST. GFR  (AFRICAN AMERICAN): >60 ML/MIN/1.73 M^2
EST. GFR  (NON AFRICAN AMERICAN): >60 ML/MIN/1.73 M^2
GLUCOSE SERPL-MCNC: 105 MG/DL
GLUCOSE UR QL STRIP: NEGATIVE
HCT VFR BLD AUTO: 33.9 %
HGB BLD-MCNC: 10.5 G/DL
HGB UR QL STRIP: NEGATIVE
IMM GRANULOCYTES # BLD AUTO: 0.02 K/UL
IMM GRANULOCYTES NFR BLD AUTO: 0.3 %
KETONES UR QL STRIP: NEGATIVE
LEUKOCYTE ESTERASE UR QL STRIP: ABNORMAL
LYMPHOCYTES # BLD AUTO: 1.8 K/UL
LYMPHOCYTES NFR BLD: 24.3 %
MCH RBC QN AUTO: 23.4 PG
MCHC RBC AUTO-ENTMCNC: 31 G/DL
MCV RBC AUTO: 76 FL
MICROSCOPIC COMMENT: NORMAL
MONOCYTES # BLD AUTO: 0.5 K/UL
MONOCYTES NFR BLD: 7 %
NEUTROPHILS # BLD AUTO: 4.9 K/UL
NEUTROPHILS NFR BLD: 66 %
NITRITE UR QL STRIP: NEGATIVE
NRBC BLD-RTO: 0 /100 WBC
PH UR STRIP: 7 [PH] (ref 5–8)
PLATELET # BLD AUTO: 274 K/UL
PMV BLD AUTO: 10.6 FL
POTASSIUM SERPL-SCNC: 3.5 MMOL/L
PROT SERPL-MCNC: 8.2 G/DL
PROT UR QL STRIP: NEGATIVE
RBC # BLD AUTO: 4.49 M/UL
SODIUM SERPL-SCNC: 140 MMOL/L
SP GR UR STRIP: 1.01 (ref 1–1.03)
SQUAMOUS #/AREA URNS AUTO: 2 /HPF
URN SPEC COLLECT METH UR: ABNORMAL
UROBILINOGEN UR STRIP-ACNC: NEGATIVE EU/DL
WBC # BLD AUTO: 7.4 K/UL
WBC #/AREA URNS AUTO: 3 /HPF (ref 0–5)

## 2018-07-14 PROCEDURE — 85025 COMPLETE CBC W/AUTO DIFF WBC: CPT

## 2018-07-14 PROCEDURE — 80053 COMPREHEN METABOLIC PANEL: CPT

## 2018-07-14 PROCEDURE — 99283 EMERGENCY DEPT VISIT LOW MDM: CPT | Mod: ,,, | Performed by: EMERGENCY MEDICINE

## 2018-07-14 PROCEDURE — 81001 URINALYSIS AUTO W/SCOPE: CPT

## 2018-07-14 PROCEDURE — 99284 EMERGENCY DEPT VISIT MOD MDM: CPT

## 2018-07-14 NOTE — ED PROVIDER NOTES
Encounter Date: 7/14/2018       History     Chief Complaint   Patient presents with    Vaginal Bleeding     vaginal bleeding since this am. Hysterectomy in 2001. been through 3 pads since this am     Had an episode of lower abdominal cramping and vaginal bleeding that began this morning. Had passed dark brown blood. No hematuria, no hematochezia. Had hx of hysterectomy. No skin lesions, no previous episodes. No recent intercourse. Used 3 pads this am. Did not douche          Review of patient's allergies indicates:   Allergen Reactions    Ace inhibitors Swelling    Benicar [olmesartan] Swelling    Methyldopa Other (See Comments)     sleepy    Clonidine Other (See Comments)     sleepy     Past Medical History:   Diagnosis Date    Anemia     Diverticulosis     History of gastroesophageal reflux (GERD)     Hyperlipidemia     Hypertension     Stroke 2015    Trauma 1998    Fractured Right ankle     Past Surgical History:   Procedure Laterality Date    BARTHOLIN GLAND CYST EXCISION Bilateral     HYSTERECTOMY  10/2006    Laparoscopic Supracervical Hysterectomy    TUBAL LIGATION       Family History   Problem Relation Age of Onset    Colon cancer Mother 55    Hypertension Mother     Diabetes Father     Hypertension Father     Hypertension Brother     Hypertension Sister     Heart disease Neg Hx     Heart attack Neg Hx     Breast cancer Neg Hx     Ovarian cancer Neg Hx      Social History   Substance Use Topics    Smoking status: Never Smoker    Smokeless tobacco: Never Used    Alcohol use Yes     Review of Systems   Constitutional: Negative.    HENT: Negative.    Eyes: Negative.    Respiratory: Negative.    Cardiovascular: Negative.    Gastrointestinal: Negative.    Genitourinary: Positive for vaginal bleeding. Negative for difficulty urinating, dysuria, hematuria, vaginal discharge and vaginal pain.   Musculoskeletal: Negative.    Neurological: Negative.    Hematological: Negative.    All other  systems reviewed and are negative.      Physical Exam     Initial Vitals [07/14/18 1438]   BP Pulse Resp Temp SpO2   (!) 143/84 74 18 98.8 °F (37.1 °C) 96 %      MAP       --         Physical Exam    Nursing note and vitals reviewed.  Constitutional: She appears well-developed and well-nourished.   HENT:   Head: Normocephalic and atraumatic.   Mouth/Throat: Oropharynx is clear and moist.   Eyes: Conjunctivae and EOM are normal. Pupils are equal, round, and reactive to light.   Neck: Normal range of motion. Neck supple.   Cardiovascular: Normal rate, regular rhythm and normal heart sounds.   Pulmonary/Chest: Breath sounds normal.   Abdominal: Soft. Bowel sounds are normal. She exhibits no distension. There is no tenderness.   obese   Genitourinary: Vagina normal.   Genitourinary Comments: No blood in vault, no perineal or vaginal polyp or lesion.    Musculoskeletal: She exhibits no edema.   Neurological: She is alert and oriented to person, place, and time. She has normal strength.   Skin: Skin is warm. Capillary refill takes less than 2 seconds.         ED Course   Procedures  Labs Reviewed   CBC W/ AUTO DIFFERENTIAL - Abnormal; Notable for the following:        Result Value    Hemoglobin 10.5 (*)     Hematocrit 33.9 (*)     MCV 76 (*)     MCH 23.4 (*)     MCHC 31.0 (*)     RDW 16.8 (*)     All other components within normal limits   URINALYSIS, REFLEX TO URINE CULTURE - Abnormal; Notable for the following:     Leukocytes, UA Trace (*)     All other components within normal limits    Narrative:     Preferred Collection Type->Urine, Clean Catch  white cap tube    COMPREHENSIVE METABOLIC PANEL   URINALYSIS MICROSCOPIC    Narrative:     Preferred Collection Type->Urine, Clean Catch  white cap tube           Imaging Results    None          Medical Decision Making:   ED Management:  ? avm or intravaginal lesion. No active bleeding. Will check urine and cbc.                      Clinical Impression:   The encounter  diagnosis was Vaginal bleeding.                             Brijesh Barragan MD  07/14/18 8862

## 2018-08-01 ENCOUNTER — HOSPITAL ENCOUNTER (OUTPATIENT)
Dept: RADIOLOGY | Facility: HOSPITAL | Age: 53
Discharge: HOME | End: 2018-08-01
Attending: OBSTETRICS & GYNECOLOGY
Payer: COMMERCIAL

## 2018-08-01 ENCOUNTER — PATIENT MESSAGE (OUTPATIENT)
Dept: INTERNAL MEDICINE | Facility: CLINIC | Age: 53
End: 2018-08-01

## 2018-08-01 ENCOUNTER — OFFICE VISIT (OUTPATIENT)
Dept: INTERNAL MEDICINE | Facility: CLINIC | Age: 53
End: 2018-08-01
Payer: COMMERCIAL

## 2018-08-01 ENCOUNTER — OFFICE VISIT (OUTPATIENT)
Dept: OBSTETRICS AND GYNECOLOGY | Facility: CLINIC | Age: 53
End: 2018-08-01
Payer: COMMERCIAL

## 2018-08-01 VITALS
HEART RATE: 68 BPM | SYSTOLIC BLOOD PRESSURE: 144 MMHG | HEIGHT: 71 IN | DIASTOLIC BLOOD PRESSURE: 84 MMHG | WEIGHT: 269.19 LBS | BODY MASS INDEX: 37.69 KG/M2

## 2018-08-01 VITALS
HEIGHT: 71 IN | DIASTOLIC BLOOD PRESSURE: 100 MMHG | BODY MASS INDEX: 37.47 KG/M2 | SYSTOLIC BLOOD PRESSURE: 160 MMHG | WEIGHT: 267.63 LBS

## 2018-08-01 DIAGNOSIS — N75.1 BARTHOLIN'S GLAND ABSCESS: ICD-10-CM

## 2018-08-01 DIAGNOSIS — Z12.31 ENCOUNTER FOR SCREENING MAMMOGRAM FOR BREAST CANCER: ICD-10-CM

## 2018-08-01 DIAGNOSIS — Z01.419 ENCOUNTER FOR GYNECOLOGICAL EXAMINATION WITHOUT ABNORMAL FINDING: Primary | ICD-10-CM

## 2018-08-01 DIAGNOSIS — Z23 NEED FOR DIPHTHERIA-TETANUS-PERTUSSIS (TDAP) VACCINE: ICD-10-CM

## 2018-08-01 DIAGNOSIS — Z12.31 SCREENING MAMMOGRAM, ENCOUNTER FOR: ICD-10-CM

## 2018-08-01 DIAGNOSIS — I10 ESSENTIAL HYPERTENSION: Primary | ICD-10-CM

## 2018-08-01 DIAGNOSIS — I87.2 VENOUS INSUFFICIENCY OF BOTH LOWER EXTREMITIES: ICD-10-CM

## 2018-08-01 DIAGNOSIS — Z86.73 HISTORY OF STROKE: ICD-10-CM

## 2018-08-01 PROCEDURE — 99214 OFFICE O/P EST MOD 30 MIN: CPT | Mod: S$GLB,,, | Performed by: INTERNAL MEDICINE

## 2018-08-01 PROCEDURE — 3077F SYST BP >= 140 MM HG: CPT | Mod: CPTII,S$GLB,, | Performed by: OBSTETRICS & GYNECOLOGY

## 2018-08-01 PROCEDURE — 3079F DIAST BP 80-89 MM HG: CPT | Mod: CPTII,S$GLB,, | Performed by: INTERNAL MEDICINE

## 2018-08-01 PROCEDURE — 99396 PREV VISIT EST AGE 40-64: CPT | Mod: S$GLB,,, | Performed by: OBSTETRICS & GYNECOLOGY

## 2018-08-01 PROCEDURE — 3078F DIAST BP <80 MM HG: CPT | Mod: CPTII,S$GLB,, | Performed by: OBSTETRICS & GYNECOLOGY

## 2018-08-01 PROCEDURE — 99999 PR PBB SHADOW E&M-EST. PATIENT-LVL III: CPT | Mod: PBBFAC,,, | Performed by: INTERNAL MEDICINE

## 2018-08-01 PROCEDURE — 87070 CULTURE OTHR SPECIMN AEROBIC: CPT

## 2018-08-01 PROCEDURE — 87186 SC STD MICRODIL/AGAR DIL: CPT

## 2018-08-01 PROCEDURE — 77063 BREAST TOMOSYNTHESIS BI: CPT | Mod: TC

## 2018-08-01 PROCEDURE — 77067 SCR MAMMO BI INCL CAD: CPT | Mod: 26,,, | Performed by: RADIOLOGY

## 2018-08-01 PROCEDURE — 87077 CULTURE AEROBIC IDENTIFY: CPT

## 2018-08-01 PROCEDURE — 77063 BREAST TOMOSYNTHESIS BI: CPT | Mod: 26,,, | Performed by: RADIOLOGY

## 2018-08-01 PROCEDURE — 3077F SYST BP >= 140 MM HG: CPT | Mod: CPTII,S$GLB,, | Performed by: INTERNAL MEDICINE

## 2018-08-01 PROCEDURE — 99999 PR PBB SHADOW E&M-EST. PATIENT-LVL III: CPT | Mod: PBBFAC,,, | Performed by: OBSTETRICS & GYNECOLOGY

## 2018-08-01 PROCEDURE — 3008F BODY MASS INDEX DOCD: CPT | Mod: CPTII,S$GLB,, | Performed by: INTERNAL MEDICINE

## 2018-08-01 RX ORDER — NIFEDIPINE 90 MG/1
90 TABLET, EXTENDED RELEASE ORAL DAILY
Qty: 30 TABLET | Refills: 5 | Status: SHIPPED | OUTPATIENT
Start: 2018-08-01 | End: 2018-12-05 | Stop reason: SDUPTHER

## 2018-08-01 RX ORDER — TRIAMTERENE AND HYDROCHLOROTHIAZIDE 37.5; 25 MG/1; MG/1
1 CAPSULE ORAL EVERY MORNING
Qty: 30 CAPSULE | Refills: 5 | Status: SHIPPED | OUTPATIENT
Start: 2018-08-01 | End: 2018-12-05 | Stop reason: SDUPTHER

## 2018-08-01 RX ORDER — ATORVASTATIN CALCIUM 20 MG/1
20 TABLET, FILM COATED ORAL DAILY
Qty: 30 TABLET | Refills: 5 | Status: SHIPPED | OUTPATIENT
Start: 2018-08-01 | End: 2018-12-05 | Stop reason: SDUPTHER

## 2018-08-01 RX ORDER — CARVEDILOL 25 MG/1
25 TABLET ORAL 2 TIMES DAILY WITH MEALS
Qty: 60 TABLET | Refills: 5 | Status: SHIPPED | OUTPATIENT
Start: 2018-08-01 | End: 2018-12-05 | Stop reason: SDUPTHER

## 2018-08-01 NOTE — PROGRESS NOTES
Subjective:       Patient ID: Lori Burgess is a 52 y.o. female.    Chief Complaint:  Well Woman      History of Present Illness  HPI    Lori Burgess is a 52 y.o. female  here for her annual GYN exam.  She states she was seen at the ED for vaginal bleeding about 2 weeks ago , thinks the RIght Bartholin's had popped.   She describes her periods as stopped with LSH in ,   denies break through bleeding.   denies vaginal itching or irritation.  Reports vaginal discharge.  She is  Not currently sexually active.      History of abnormal pap: No  Last Pap: approximate date  and was normal  Last MMG: normal--routine follow-up in 12 months  Last Colonoscopy:  colonoscopy 3 years ago without abnormalities.  denies domestic violence. She does feel safe at home.     Past Medical History:   Diagnosis Date    Anemia     Diverticulosis     History of gastroesophageal reflux (GERD)     Hyperlipidemia     Hypertension     Stroke 2015    Trauma 1998    Fractured Right ankle     Past Surgical History:   Procedure Laterality Date    BARTHOLIN GLAND CYST EXCISION      Marsupialization, not excision    HYSTERECTOMY  10/2006    Laparoscopic Supracervical Hysterectomy    TUBAL LIGATION       Social History     Social History    Marital status: Single     Spouse name: N/A    Number of children: N/A    Years of education: N/A     Occupational History     [employer]     Social History Main Topics    Smoking status: Never Smoker    Smokeless tobacco: Never Used    Alcohol use Yes    Drug use: No    Sexual activity: Not Currently     Partners: Male     Birth control/ protection: None, Condom, See Surgical Hx     Other Topics Concern    Not on file     Social History Narrative    No narrative on file     Family History   Problem Relation Age of Onset    Colon cancer Mother 55    Hypertension Mother     Diabetes Father     Hypertension Father     Hypertension Brother      "Hypertension Sister     Heart disease Neg Hx     Heart attack Neg Hx     Breast cancer Neg Hx     Ovarian cancer Neg Hx      OB History      Para Term  AB Living    3 3 3     3    SAB TAB Ectopic Multiple Live Births            3          BP (!) 160/100   Ht 5' 11" (1.803 m)   Wt 121.4 kg (267 lb 10.2 oz)   LMP  (LMP Unknown)   BMI 37.33 kg/m²         GYN & OB History  No LMP recorded (lmp unknown). Patient has had a hysterectomy.   Date of Last Pap: 3/20/2017    OB History    Para Term  AB Living   3 3 3     3   SAB TAB Ectopic Multiple Live Births           3      # Outcome Date GA Lbr Lion/2nd Weight Sex Delivery Anes PTL Lv   3 Term      Vag-Spont  N KAYLYN   2 Term      Vag-Spont  N KAYLYN   1 Term      Vag-Spont  N KAYLYN          Review of Systems  Review of Systems   Constitutional: Negative for activity change, appetite change, fatigue and unexpected weight change.   HENT: Negative.    Eyes: Negative for visual disturbance.   Respiratory: Negative for shortness of breath and wheezing.    Cardiovascular: Negative for chest pain, palpitations and leg swelling.   Gastrointestinal: Negative for abdominal pain, bloating and blood in stool.   Endocrine: Negative for diabetes, hair loss and hot flashes.   Genitourinary: Negative for decreased libido, dyspareunia, pelvic pain and postmenopausal bleeding.   Musculoskeletal: Negative for back pain and joint swelling.   Skin:  Negative for no acne and hair changes.   Neurological: Negative for headaches.   Hematological: Does not bruise/bleed easily.   Psychiatric/Behavioral: Negative for depression and sleep disturbance. The patient is not nervous/anxious.    Breast: Negative for breast pain and nipple discharge          Objective:    Physical Exam:   Constitutional: She is oriented to person, place, and time. She appears well-developed and well-nourished.    HENT:   Head: Normocephalic and atraumatic.    Eyes: EOM are normal. Pupils are " equal, round, and reactive to light.    Neck: Normal range of motion. Neck supple.    Cardiovascular: Normal rate and regular rhythm.     Pulmonary/Chest: Effort normal and breath sounds normal.   BREASTS:  no mass, no tenderness, no deformity and no retraction. Right breast exhibits no inverted nipple, no mass, no nipple discharge, no skin change, no tenderness, no bleeding and no swelling. Left breast exhibits no inverted nipple, no mass, no nipple discharge, no skin change, no tenderness, no bleeding and no swelling. Breasts are symmetrical.              Abdominal: Soft. Bowel sounds are normal.     Genitourinary:       Pelvic exam was performed with patient supine. Labial bartholins abnormal.  Genitourinary Comments: PELVIC: Normal external genitalia with 2 cm Right Bartholin's with purulent drainage from fistulous tract. Normal hair distribution.  Adequate perineal body, normal urethral meatus.  Vagina  Dry and poorly rugated, atrophic, without lesions or discharge.  Cervix pink, without lesions, discharge or tenderness.  No significant cystocele or rectocele.  Bimanual exam shows uterus to be SURGICALLY ABSENT.  Adnexa without masses or tenderness.    RECTAL:Deferred             Musculoskeletal: Normal range of motion and moves all extremeties.       Neurological: She is alert and oriented to person, place, and time.    Skin: Skin is warm and dry.    Psychiatric: She has a normal mood and affect.          Assessment:        1. Encounter for gynecological examination without abnormal finding    2. Screening mammogram, encounter for    3. Bartholin's gland abscess               Plan:        1. Encounter for gynecological examination without abnormal finding  COUNSELING:  The patient was counseled today on osteoporosis prevention, calcium supplementation, and regular weight bearing exercise. The patient was also counseled today on ACS PAP guidelines, with recommendations for yearly pelvic exams unless their  uterus, cervix, and ovaries were removed for benign reasons; in that case, examinations every 3-5 years are recommended. The patient was also counseled regarding monthly breast self-examination, routine STD screening for at-risk populations, prophylactic immunizations for transmitted infections such as HPV, Pertussis, or Influenza as appropriate, and yearly mammograms when indicated by ACS guidelines. She was advised to see her primary care physician for all other health maintenance.   FOLLOW-UP with me for next routine visit.       2. Screening mammogram, encounter for    - Mammo Digital Screening Bilat with Tomosynthesis CAD; Future    3. Bartholin's gland abscess    - Aerobic culture       Follow-up in about 1 year (around 8/1/2019).

## 2018-08-01 NOTE — LETTER
August 1, 2018    Lori Burgess  [address]  [place] [address]         [facility]  [address]  [facility] [address]  Phone: [phone]  Fax: [phone] August 1, 2018     Patient: Lori Burgess   YOB: 1965   Date of Visit: 8/1/2018       To Whom It May Concern:    It is my medical opinion that Lori Burgess may return to light duty immediately with the following restrictions: Shifts limited to 8 (eight) hours per day due to medical conditions worsened by prolonged standing, improper diet, stress and sleep deprivation caused by repeatedly working overtime.    If you have any questions or concerns, please don't hesitate to call.    Sincerely,        Darya Clinton MD

## 2018-08-02 NOTE — PROGRESS NOTES
Subjective:       Patient ID: Lori Burgess is a 52 y.o. female.    Chief Complaint: Follow-up    Last seen 5 months ago. Returns for f/u. Two recent ER visits: 18 for left flank pain - afebrile, VSS, labs normal, CT normal. 18 for vaginal bleeding s/p hysterectomy, exam normal, labs stable. Both have resolved. She is seeing Gynecology later today. 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 She alternates 7-8 consecutive days on with 2-4 days off, up to 16 hours each day. She gets only 4 hours of sleep many nights. Gets very short lunch breaks (15 minutes), having a hard time getting proper nutrition. And she feels very stressed. Concerned that this is running her blood pressure up, and fearful of having another stroke. She does not want to give up her job, but doesn't feel she can sustain this level of demand. Requesting a recommendation of light duty in the form of limiting her hours for medical reasons.     PMH: .   Hypertension since age 20.   Hyperlipidemia. TChol 128, TG 91, HDL 40, LDL 70 .  Left Lacunar CVA.  Pre-Diabetes, HbA1c 5.9% .  GERD.   Sigmoid Diverticulosis.   H/O Iron-def. Anemia.   Vitamin D insufficiency.  Obesity.     PSH: BTL, Hysterectomy (benign), ovaries intact. Bilateral Bartholin's cysts.     Mammogram normal 3/17, Pap normal 3/17. Colonoscopy normal 5/15. Eye exam 2016 at [facility]. Flu shot  - declines repeat. Hep C negative. Exercise Stress Echo  negative, normal LV function.    Social: No tobacco or alcohol. , three adult children all live locally, six grandchildren.  working in the residential system.     FMH: Hypertension in both parents and siblings. Father  age 50 with complications of Diabetes including heart failure, kidney failure, bilateral amputee. Mother  age 55 with Colon cancer. Heart disease and Diabetes  "in paternal aunt also.     Allergies: ACE inhibitors, ARBs, Methyldopa, Clonidine.     Medications: list reviewed and reconciled.            Review of Systems   Constitutional: Positive for fatigue. Negative for diaphoresis.   Respiratory: Negative for cough and shortness of breath.    Cardiovascular: Negative for chest pain and palpitations.   Gastrointestinal: Negative for abdominal pain, constipation, diarrhea, nausea and vomiting.   Genitourinary: Negative for dysuria and frequency.   Musculoskeletal: Negative for arthralgias and myalgias.   Neurological: Negative for dizziness, syncope, weakness and headaches.       Objective:    /84, Pulse 68, Ht 5' 11", Wt 269 lbs, BMI=37.5  Physical Exam   Constitutional: She is oriented to person, place, and time. She appears well-developed and well-nourished. No distress.   HENT:   Nose: Nose normal.   Mouth/Throat: Oropharynx is clear and moist.   Eyes: Conjunctivae and EOM are normal.   Neck: Normal range of motion. Neck supple. No JVD present.   Cardiovascular: Normal rate, regular rhythm and normal heart sounds.    Pulmonary/Chest: Effort normal and breath sounds normal. No respiratory distress. She has no wheezes. She has no rales.   Musculoskeletal: Normal range of motion.   Only trace edema here early this morning after being off duty the last couple days.    Neurological: She is alert and oriented to person, place, and time. No cranial nerve deficit. Coordination normal.   Skin: Skin is warm and dry. She is not diaphoretic.   Psychiatric: She has a normal mood and affect. Her behavior is normal.       Assessment:       1. Essential hypertension    2. Venous insufficiency of both lower extremities    3. Encounter for screening mammogram for breast cancer    4. Need for diphtheria-tetanus-pertussis (Tdap) vaccine        Plan:       Essential hypertension not at goal.       -      Increase Nifedipine from 60 to 90mg daily, Dyazide and Coreg are maximized.        "  -      Note given recommending a limit to hours worked per day for health purposes.     Venous insufficiency of both lower extremities       -      Dependent edema is alleviated when not standing all day, continue low sodium diet and compression stockings.     Encounter for screening mammogram for breast cancer  -     Mammo Digital Screening Bilat with CAD; Future; Expected date: 08/01/2018    Need for diphtheria-tetanus-pertussis (Tdap) vaccine - Tdap today.

## 2018-08-03 LAB — BACTERIA SPEC AEROBE CULT: NORMAL

## 2018-08-06 DIAGNOSIS — N75.1 BARTHOLIN'S GLAND ABSCESS: Primary | ICD-10-CM

## 2018-08-06 RX ORDER — CIPROFLOXACIN 250 MG/1
250 TABLET, FILM COATED ORAL 2 TIMES DAILY
Qty: 14 TABLET | Refills: 0 | Status: SHIPPED | OUTPATIENT
Start: 2018-08-06 | End: 2018-08-13

## 2018-08-06 RX ORDER — CIPROFLOXACIN 250 MG/1
250 TABLET, FILM COATED ORAL 2 TIMES DAILY
Qty: 10 TABLET | Refills: 0 | Status: SHIPPED | OUTPATIENT
Start: 2018-08-06 | End: 2018-08-06 | Stop reason: SDUPTHER

## 2018-08-07 ENCOUNTER — TELEPHONE (OUTPATIENT)
Dept: OBSTETRICS AND GYNECOLOGY | Facility: CLINIC | Age: 53
End: 2018-08-07

## 2018-08-07 NOTE — TELEPHONE ENCOUNTER
Left msg. Pres. Called in again. ----- Message from Odette Peoples sent at 8/7/2018  2:31 PM CDT -----  Name of Who is Calling: JOSE SELLERS [[id number]]      What is the request in detail: Pt states she was told by [facility] that he prescriptions have not been received. Please resend and contact the pt with an update.     Can the clinic reply by [facility]:  Yes        What Number to Call Back if not in [facility]: [phone]

## 2018-08-09 ENCOUNTER — PATIENT MESSAGE (OUTPATIENT)
Dept: OBSTETRICS AND GYNECOLOGY | Facility: CLINIC | Age: 53
End: 2018-08-09

## 2018-08-09 DIAGNOSIS — N75.1 BARTHOLIN'S GLAND ABSCESS: Primary | ICD-10-CM

## 2018-08-09 RX ORDER — AMOXICILLIN AND CLAVULANATE POTASSIUM 875; 125 MG/1; MG/1
1 TABLET, FILM COATED ORAL EVERY 12 HOURS
Qty: 14 TABLET | Refills: 0 | Status: SHIPPED | OUTPATIENT
Start: 2018-08-09 | End: 2018-11-01

## 2018-09-18 ENCOUNTER — OFFICE VISIT (OUTPATIENT)
Dept: INTERNAL MEDICINE | Facility: CLINIC | Age: 53
End: 2018-09-18
Payer: COMMERCIAL

## 2018-09-18 VITALS
BODY MASS INDEX: 37.77 KG/M2 | HEIGHT: 71 IN | HEART RATE: 67 BPM | DIASTOLIC BLOOD PRESSURE: 82 MMHG | TEMPERATURE: 98 F | SYSTOLIC BLOOD PRESSURE: 124 MMHG | OXYGEN SATURATION: 98 % | WEIGHT: 269.81 LBS

## 2018-09-18 DIAGNOSIS — R60.0 BILATERAL LOWER EXTREMITY EDEMA: ICD-10-CM

## 2018-09-18 DIAGNOSIS — T63.461A WASP STING, ACCIDENTAL OR UNINTENTIONAL, INITIAL ENCOUNTER: Primary | ICD-10-CM

## 2018-09-18 PROCEDURE — 99214 OFFICE O/P EST MOD 30 MIN: CPT | Mod: S$GLB,,, | Performed by: NURSE PRACTITIONER

## 2018-09-18 PROCEDURE — 3074F SYST BP LT 130 MM HG: CPT | Mod: CPTII,S$GLB,, | Performed by: NURSE PRACTITIONER

## 2018-09-18 PROCEDURE — 3008F BODY MASS INDEX DOCD: CPT | Mod: CPTII,S$GLB,, | Performed by: NURSE PRACTITIONER

## 2018-09-18 PROCEDURE — 3079F DIAST BP 80-89 MM HG: CPT | Mod: CPTII,S$GLB,, | Performed by: NURSE PRACTITIONER

## 2018-09-18 PROCEDURE — 99999 PR PBB SHADOW E&M-EST. PATIENT-LVL V: CPT | Mod: PBBFAC,,, | Performed by: NURSE PRACTITIONER

## 2018-09-18 NOTE — PATIENT INSTRUCTIONS
Increase Dyazide to 2 tablets a day for swelling, if this helps let me know and I will change your prescription at the pharmacy    Continue to monitor your Blood pressure at home    Limit sodium to 2 grams a day    For wasp bites: stop using  Alcohol, use antibiotic ointment of choice and Benadryl cream 3 times a day    Can also take over the counter Claritin one a day          Low-Salt Choices  Eating salt (sodium) can make your body retain too much water. Excess water makes your heart work harder. Canned, packaged, and frozen foods are easy to prepare, but they are often high in sodium. Here are some ideas for low-salt foods you can easily prepare yourself.    For breakfast  · Fruit or 100% fruit juice  · Whole-wheat bread or an English muffin. Compare sodium content on labels.  · Low-fat milk or yogurt  · Unsalted eggs  · Shredded wheat  · Corn tortillas  · Unsalted steamed rice  · Regular (not instant) hot cereal, made without salt  Stay away from:  · Sausage, dunlap, and ham  · Flour tortillas  · Packaged muffins, pancakes, and biscuits  · Instant hot cereals  · Cottage cheese  For lunch and dinner  · Fresh fish, chicken, turkey, or meat--baked, broiled, or roasted without salt  · Dry beans, cooked without salt  · Tofu, stir-fried without salt  · Unsalted fresh fruit and vegetables, or frozen or canned fruit and vegetables with no added salt  Stay away from:  · Lunch or deli meat that is cured or smoked  · Cheese  · Tomato juice and catsup  · Canned vegetables, soups, and fish not labeled as no-salt-added or reduced sodium  · Packaged gravies and sauces  · Olives, pickles, and relish  · Bottled salad dressings  For snacks and desserts  · Yogurt  · Unsalted, air popped popcorn  · Unsalted nuts or seeds  Stay away from:  · Pies and cakes  · Packaged dessert mixes  · Pizza  · Canned and packaged puddings  · Pretzels, chips, crackers, and nuts--unless the label says unsalted  Date Last Reviewed: 6/17/2015  ©  8500-9894 [employer]. [address] [place] [address], [place] [address]. All rights reserved. This information is not intended as a substitute for professional medical care. Always follow your healthcare professional's instructions.

## 2018-09-18 NOTE — PROGRESS NOTES
Subjective:       Patient ID: Lori Burgess is a 52 y.o. female.    Chief Complaint: Insect Bite (wasp 9/13) and Leg Swelling    Pt here with concerns about wasp stings in her leg and leg swelling.  Pt states that she has been using rubbing alcohol on her stings and they continue to have some redness.  The redness is less than initially per pt.  No drainage.    Pt also states that she has had some swelling of her legs since Dr Clinton changed her BP meds.  NO SOB or chest pain        Insect Bite   Pertinent negatives include no arthralgias, coughing, fatigue, fever or joint swelling.     Review of Systems   Constitutional: Negative for activity change, appetite change, fatigue and fever.   Respiratory: Negative for cough and shortness of breath.    Cardiovascular: Positive for leg swelling.   Musculoskeletal: Negative for arthralgias and joint swelling.   Skin: Positive for color change.   Hematological: Negative for adenopathy.   Psychiatric/Behavioral: Negative for sleep disturbance.         Past Medical History:   Diagnosis Date    Anemia     Diverticulosis     History of gastroesophageal reflux (GERD)     Hyperlipidemia     Hypertension     Stroke 2015    Trauma 1998    Fractured Right ankle     Past Surgical History:   Procedure Laterality Date    BARTHOLIN GLAND CYST EXCISION      Marsupialization, not excision    COLONOSCOPY N/A 5/28/2015    Performed by Miller Whitmore [facility] at [facility] (4TH FLR)    HYSTERECTOMY  10/2006    Laparoscopic Supracervical Hysterectomy    TUBAL LIGATION       Social History     Social History Narrative    Not on file     Family History   Problem Relation Age of Onset    Colon cancer Mother 55    Hypertension Mother     Diabetes Father     Hypertension Father     Hypertension Brother     Hypertension Sister     Heart disease Neg Hx     Heart attack Neg Hx     Breast cancer Neg Hx     Ovarian cancer Neg Hx      Outpatient Encounter Medications as of  "9/18/2018   Medication Sig Dispense Refill    amoxicillin-clavulanate 875-125mg (AUGMENTIN) 875-125 mg per tablet Take 1 tablet by mouth every 12 (twelve) hours. 14 tablet 0    atorvastatin (LIPITOR) 20 MG tablet Take 1 tablet (20 mg total) by mouth once daily. 30 tablet 5    carvedilol (COREG) 25 MG tablet Take 1 tablet (25 mg total) by mouth 2 (two) times daily with meals. 60 tablet 5    NIFEdipine (PROCARDIA-XL) 90 MG (OSM) 24 hr tablet Take 1 tablet (90 mg total) by mouth once daily. 30 tablet 5    potassium chloride (KLOR-CON) 10 MEQ TbSR Take 2 tablets (20 mEq total) by mouth 2 (two) times daily. 120 tablet 5    triamterene-hydrochlorothiazide 37.5-25 mg (DYAZIDE) 37.5-25 mg per capsule Take 1 capsule by mouth every morning. 30 capsule 5    aspirin (ECOTRIN) 325 MG EC tablet Take 1 tablet (325 mg total) by mouth once daily.  0     No facility-administered encounter medications on file as of 9/18/2018.      Last 3 sets of Vitals  Vitals - 1 value per visit 8/1/2018 8/1/2018 9/18/2018   SYSTOLIC 160 144 124   DIASTOLIC 100 84 82   PULSE - 68 67   TEMPERATURE - - 98.4   RESPIRATIONS - - -   SPO2 - - 98   Weight (lb) 267.64 269.18 269.84   Weight (kg) 121.4 122.1 122.4   HEIGHT 5' 11" 5' 11" 5' 11"   BODY MASS INDEX 37.33 37.54 37.64   VISIT REPORT - - -   Pain Score  0 - 0   Some recent data might be hidden         Objective:      Physical Exam   Constitutional: She is oriented to person, place, and time. She appears well-developed and well-nourished. No distress.   Eyes: Conjunctivae are normal. Pupils are equal, round, and reactive to light.   Cardiovascular:   lincoln lower extremity edema noted trace non pitting     Pulmonary/Chest: Effort normal.   Neurological: She is alert and oriented to person, place, and time.   Skin: Skin is warm and dry. Capillary refill takes less than 2 seconds. She is not diaphoretic. There is erythema.        Small puncture noted to right upper thigh with minimal erythema " surrounding.  No fluctuance noted, no retained FB noted, nor ed streaks noted    Upper right lateral calf with wheal noted.  No erythema noted, no retained FB   No drainage noted     Psychiatric: She has a normal mood and affect. Her behavior is normal. Judgment and thought content normal.   Nursing note and vitals reviewed.          Lab Results   Component Value Date    WBC 7.40 07/14/2018    RBC 4.49 07/14/2018    HGB 10.5 (L) 07/14/2018    HCT 33.9 (L) 07/14/2018    MCV 76 (L) 07/14/2018    MCH 23.4 (L) 07/14/2018    MCHC 31.0 (L) 07/14/2018    RDW 16.8 (H) 07/14/2018     07/14/2018    MPV 10.6 07/14/2018    GRAN 4.9 07/14/2018    GRAN 66.0 07/14/2018    LYMPH 1.8 07/14/2018    LYMPH 24.3 07/14/2018    MONO 0.5 07/14/2018    MONO 7.0 07/14/2018    EOS 0.2 07/14/2018    BASO 0.03 07/14/2018    EOSINOPHIL 2.0 07/14/2018    BASOPHIL 0.4 07/14/2018     Lab Results   Component Value Date    WBC 7.40 07/14/2018    HGB 10.5 (L) 07/14/2018    HCT 33.9 (L) 07/14/2018     07/14/2018    CHOL 128 03/05/2018    TRIG 91 03/05/2018    HDL 40 03/05/2018    ALT 13 07/14/2018    AST 13 07/14/2018     07/14/2018    K 3.5 07/14/2018     07/14/2018    CREATININE 0.9 07/14/2018    BUN 9 07/14/2018    CO2 28 07/14/2018    TSH 1.405 10/23/2017    INR 1.0 08/09/2015    HGBA1C 5.9 (H) 03/05/2018       Assessment:       1. Wasp sting, accidental or unintentional, initial encounter    2. Bilateral lower extremity edema        Plan:       Note given for work for today      Lori was seen today for insect bite and leg swelling.    Diagnoses and all orders for this visit:    Wasp sting, accidental or unintentional, initial encounter    Bilateral lower extremity edema  Comments:  increase Dyazide, wear compression stockings        Patient Instructions   Increase Dyazide to 2 tablets a day for swelling, if this helps let me know and I will change your prescription at the pharmacy    Continue to monitor your Blood  pressure at home    Limit sodium to 2 grams a day    For wasp bites: stop using  Alcohol, use antibiotic ointment of choice and Benadryl cream 3 times a day    Can also take over the counter Claritin one a day          Low-Salt Choices  Eating salt (sodium) can make your body retain too much water. Excess water makes your heart work harder. Canned, packaged, and frozen foods are easy to prepare, but they are often high in sodium. Here are some ideas for low-salt foods you can easily prepare yourself.    For breakfast  · Fruit or 100% fruit juice  · Whole-wheat bread or an English muffin. Compare sodium content on labels.  · Low-fat milk or yogurt  · Unsalted eggs  · Shredded wheat  · Corn tortillas  · Unsalted steamed rice  · Regular (not instant) hot cereal, made without salt  Stay away from:  · Sausage, dunlap, and ham  · Flour tortillas  · Packaged muffins, pancakes, and biscuits  · Instant hot cereals  · Cottage cheese  For lunch and dinner  · Fresh fish, chicken, turkey, or meat--baked, broiled, or roasted without salt  · Dry beans, cooked without salt  · Tofu, stir-fried without salt  · Unsalted fresh fruit and vegetables, or frozen or canned fruit and vegetables with no added salt  Stay away from:  · Lunch or deli meat that is cured or smoked  · Cheese  · Tomato juice and catsup  · Canned vegetables, soups, and fish not labeled as no-salt-added or reduced sodium  · Packaged gravies and sauces  · Olives, pickles, and relish  · Bottled salad dressings  For snacks and desserts  · Yogurt  · Unsalted, air popped popcorn  · Unsalted nuts or seeds  Stay away from:  · Pies and cakes  · Packaged dessert mixes  · Pizza  · Canned and packaged puddings  · Pretzels, chips, crackers, and nuts--unless the label says unsalted  Date Last Reviewed: 6/17/2015  © 2030-9354 [employer]. [address] [place] [address], [place] [address]. All rights reserved. This information is not intended as a substitute for professional  medical care. Always follow your healthcare professional's instructions.

## 2018-09-21 ENCOUNTER — PATIENT MESSAGE (OUTPATIENT)
Dept: OBSTETRICS AND GYNECOLOGY | Facility: CLINIC | Age: 53
End: 2018-09-21

## 2018-10-25 ENCOUNTER — PATIENT MESSAGE (OUTPATIENT)
Dept: OBSTETRICS AND GYNECOLOGY | Facility: CLINIC | Age: 53
End: 2018-10-25

## 2018-10-26 ENCOUNTER — OFFICE VISIT (OUTPATIENT)
Dept: OBSTETRICS AND GYNECOLOGY | Facility: CLINIC | Age: 53
End: 2018-10-26
Payer: COMMERCIAL

## 2018-10-26 VITALS
BODY MASS INDEX: 36.7 KG/M2 | WEIGHT: 262.13 LBS | HEIGHT: 71 IN | SYSTOLIC BLOOD PRESSURE: 132 MMHG | DIASTOLIC BLOOD PRESSURE: 83 MMHG

## 2018-10-26 DIAGNOSIS — N75.1 BARTHOLIN'S GLAND ABSCESS: Primary | ICD-10-CM

## 2018-10-26 PROCEDURE — 3075F SYST BP GE 130 - 139MM HG: CPT | Mod: CPTII,S$GLB,, | Performed by: OBSTETRICS & GYNECOLOGY

## 2018-10-26 PROCEDURE — 87070 CULTURE OTHR SPECIMN AEROBIC: CPT

## 2018-10-26 PROCEDURE — 99999 PR PBB SHADOW E&M-EST. PATIENT-LVL III: CPT | Mod: PBBFAC,,, | Performed by: OBSTETRICS & GYNECOLOGY

## 2018-10-26 PROCEDURE — 99213 OFFICE O/P EST LOW 20 MIN: CPT | Mod: S$GLB,,, | Performed by: OBSTETRICS & GYNECOLOGY

## 2018-10-26 PROCEDURE — 3079F DIAST BP 80-89 MM HG: CPT | Mod: CPTII,S$GLB,, | Performed by: OBSTETRICS & GYNECOLOGY

## 2018-10-26 PROCEDURE — 3008F BODY MASS INDEX DOCD: CPT | Mod: CPTII,S$GLB,, | Performed by: OBSTETRICS & GYNECOLOGY

## 2018-10-26 PROCEDURE — 87077 CULTURE AEROBIC IDENTIFY: CPT

## 2018-10-26 PROCEDURE — 87186 SC STD MICRODIL/AGAR DIL: CPT

## 2018-10-26 RX ORDER — SULFAMETHOXAZOLE AND TRIMETHOPRIM 800; 160 MG/1; MG/1
1 TABLET ORAL 2 TIMES DAILY
Qty: 14 TABLET | Refills: 0 | Status: SHIPPED | OUTPATIENT
Start: 2018-10-26 | End: 2018-11-28

## 2018-10-26 NOTE — PROGRESS NOTES
"  Subjective:       Patient ID: Lori Burgess is a 53 y.o. female.    Chief Complaint:  Bartholin's Cyst      History of Present Illness  HPI  The patient presents with complaints of bleeding and foul smelling discharge from a cyst on her right labia. THis has worsened over the past 2 weeks.     GYN & OB History  No LMP recorded (lmp unknown). Patient has had a hysterectomy.   Date of Last Pap: 3/20/2017    OB History    Para Term  AB Living   3 3 3     3   SAB TAB Ectopic Multiple Live Births           3      # Outcome Date GA Lbr Lion/2nd Weight Sex Delivery Anes PTL Lv   3 Term      Vag-Spont  N KAYLYN   2 Term      Vag-Spont  N KAYLYN   1 Term      Vag-Spont  N KAYLYN          Review of Systems  Review of Systems   Constitutional: Negative for activity change, appetite change, fatigue and unexpected weight change.   HENT: Negative.    Eyes: Negative for visual disturbance.   Respiratory: Negative for shortness of breath and wheezing.    Cardiovascular: Negative for chest pain, palpitations and leg swelling.   Gastrointestinal: Negative for abdominal pain, bloating and blood in stool.   Endocrine: Negative for diabetes and hair loss.   Genitourinary: Positive for vaginal discharge and vaginal odor. Negative for decreased libido and dyspareunia.   Musculoskeletal: Negative for back pain and joint swelling.   Skin:  Negative for no acne and hair changes.   Neurological: Negative for headaches.   Hematological: Does not bruise/bleed easily.   Psychiatric/Behavioral: Negative for depression and sleep disturbance. The patient is not nervous/anxious.    Breast: Negative for breast pain and nipple discharge          Objective:      /83   Ht 5' 11" (1.803 m)   Wt 118.9 kg (262 lb 2 oz)   LMP  (LMP Unknown)   BMI 36.56 kg/m²   Physical Exam:   Constitutional: She is oriented to person, place, and time. She appears well-developed and well-nourished.    HENT:   Head: Normocephalic and atraumatic.  "   Eyes: EOM are normal. Pupils are equal, round, and reactive to light.    Neck: Normal range of motion. Neck supple.    Cardiovascular: Normal rate and regular rhythm.     Pulmonary/Chest: Effort normal and breath sounds normal.        Abdominal: Soft. Bowel sounds are normal.     Genitourinary:       Pelvic exam was performed with patient supine. Labial bartholins abnormal.  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 Adequate perineal body, normal urethral meatus. Vagina atrophic without lesions or discharge. No significant cystocele or rectocele. Bimanual exam shows uterus and cervix to be surgically absent. Adnexa without masses or tenderness.  RECTAL: Deferred             Musculoskeletal: Normal range of motion and moves all extremeties.       Neurological: She is alert and oriented to person, place, and time.    Skin: Skin is warm and dry.    Psychiatric: She has a normal mood and affect.            Assessment:        1. Bartholin's gland abscess               Plan:      1. Bartholin's gland abscess    - Aerobic culture  - sulfamethoxazole-trimethoprim 800-160mg (BACTRIM DS) 800-160 mg Tab; Take 1 tablet by mouth 2 (two) times daily.  Dispense: 14 tablet; Refill: 0     Will plan on I&D with Word catheter placement if not improved.  Follow-up in about 1 week (around 11/2/2018).

## 2018-10-29 LAB — BACTERIA SPEC AEROBE CULT: NORMAL

## 2018-11-01 ENCOUNTER — PROCEDURE VISIT (OUTPATIENT)
Dept: OBSTETRICS AND GYNECOLOGY | Facility: CLINIC | Age: 53
End: 2018-11-01
Attending: OBSTETRICS & GYNECOLOGY
Payer: COMMERCIAL

## 2018-11-01 VITALS
BODY MASS INDEX: 37.04 KG/M2 | SYSTOLIC BLOOD PRESSURE: 110 MMHG | DIASTOLIC BLOOD PRESSURE: 80 MMHG | WEIGHT: 264.56 LBS | HEIGHT: 71 IN

## 2018-11-01 DIAGNOSIS — N75.1 BARTHOLIN'S GLAND ABSCESS: Primary | ICD-10-CM

## 2018-11-01 PROCEDURE — 56420 I&D BARTHOLINS GLAND ABSCESS: CPT | Mod: S$GLB,,, | Performed by: OBSTETRICS & GYNECOLOGY

## 2018-11-01 RX ORDER — SULFAMETHOXAZOLE AND TRIMETHOPRIM 800; 160 MG/1; MG/1
1 TABLET ORAL 2 TIMES DAILY
Qty: 6 TABLET | Refills: 0 | Status: SHIPPED | OUTPATIENT
Start: 2018-11-01 | End: 2018-11-28

## 2018-11-01 NOTE — PROCEDURES
Procedures   BARTHOLINS DUCT CYST/ ABSCESS DRAINAGE AND WORD CATHETER PLACEMENT:    Lori Burgess is a 53 y.o. female  presents for drainage of a Bartholins Duct abscess and for planned insertion of Word Catheter.    The patient was informed of a 15% possible failure rate for I&D of a Bartholins Cyst with Word catheter placement, plus the possibility of spontaneous expulsion of the Word catheter.  The patient was informed of the risk of bleeding, pain, and infection; she was also counseled on the alternatives to I&D with Word catheter placement and agrees to proceed.  Her allergy list was reviewed to confirm no allergy to latex.  A time out was performed.    There is a 3cm Bartholins Duct  Abscess of the right vulva.  After betadine swab x 3, the base of the Bartholins duct was injected with 1% lidocaine.  A stab incision was made into the duct with an #11 blade proximal to the hymeneal ring.  A small Hemostat was used to widen the incision.  A sterile Word catheter was inserted into the duct, inflated with 3cc of sterile water, and the needle and syringe was removed.      Assessment:  Bartholins Duct Abscess  Word Catheter Insertion    Post I&D with Word catheter insertion counseling:  The patient was counseled to manage pain with NSAIDs, Tylenol, an prescriptions per the medication card.  She was told to expect drainage from the catheter site and to ease discomfort with sitz baths or drying well with a cool hair dryer.  She was asked to maintain pelvic rest until the catheter is removed and to report foul-smelling discharge, heavy bleeding, fever greater than 101.0F, or worsening pain.  Counseling lasted about 15 minutes; after counseling the patient had no further questions.    Cultures done a week ago, will extend antibiotic txment for a full 10 days.

## 2018-11-07 ENCOUNTER — PATIENT MESSAGE (OUTPATIENT)
Dept: OBSTETRICS AND GYNECOLOGY | Facility: CLINIC | Age: 53
End: 2018-11-07

## 2018-11-28 ENCOUNTER — OFFICE VISIT (OUTPATIENT)
Dept: OBSTETRICS AND GYNECOLOGY | Facility: CLINIC | Age: 53
End: 2018-11-28
Payer: COMMERCIAL

## 2018-11-28 VITALS
HEIGHT: 71 IN | SYSTOLIC BLOOD PRESSURE: 135 MMHG | DIASTOLIC BLOOD PRESSURE: 87 MMHG | WEIGHT: 264.31 LBS | BODY MASS INDEX: 37 KG/M2

## 2018-11-28 DIAGNOSIS — N75.1 BARTHOLIN'S GLAND ABSCESS: Primary | ICD-10-CM

## 2018-11-28 PROCEDURE — 3079F DIAST BP 80-89 MM HG: CPT | Mod: CPTII,S$GLB,, | Performed by: OBSTETRICS & GYNECOLOGY

## 2018-11-28 PROCEDURE — 99212 OFFICE O/P EST SF 10 MIN: CPT | Mod: S$GLB,,, | Performed by: OBSTETRICS & GYNECOLOGY

## 2018-11-28 PROCEDURE — 3008F BODY MASS INDEX DOCD: CPT | Mod: CPTII,S$GLB,, | Performed by: OBSTETRICS & GYNECOLOGY

## 2018-11-28 PROCEDURE — 3075F SYST BP GE 130 - 139MM HG: CPT | Mod: CPTII,S$GLB,, | Performed by: OBSTETRICS & GYNECOLOGY

## 2018-11-28 PROCEDURE — 99999 PR PBB SHADOW E&M-EST. PATIENT-LVL III: CPT | Mod: PBBFAC,,, | Performed by: OBSTETRICS & GYNECOLOGY

## 2018-11-28 NOTE — PROGRESS NOTES
WORD CATHETER REMOVAL:    Lori Burgess is a 53 y.o. female  presents for a Word Catheter removal.    PRE WORD CATHETER REMOVAL COUNSELING:  The patient was informed of the minimal risks involved with Word Catheter removal.    PROCEDURE:  A time out was performed.  A needle was inserted into the catheter and the sterile water withdrawn to collapse the Balloon.  The catheter was withdrawn from the incision without difficulty.  Adquate epithelialization of the new orifice was observed.  The patient tolerated the procedure well.    ASSESSMENT:  Word Catheter removal.    POST WORD CATHETER REMOVAL COUNSELING:  Expect minimal or no drainage from catheter site.  May resume usual activities.    FOLLOW-UP: for annual Gyn exams or prn.

## 2018-12-05 ENCOUNTER — OFFICE VISIT (OUTPATIENT)
Dept: INTERNAL MEDICINE | Facility: CLINIC | Age: 53
End: 2018-12-05
Payer: COMMERCIAL

## 2018-12-05 VITALS
DIASTOLIC BLOOD PRESSURE: 78 MMHG | HEART RATE: 62 BPM | WEIGHT: 264.63 LBS | BODY MASS INDEX: 37.05 KG/M2 | SYSTOLIC BLOOD PRESSURE: 120 MMHG | HEIGHT: 71 IN

## 2018-12-05 DIAGNOSIS — Z86.73 HISTORY OF STROKE: ICD-10-CM

## 2018-12-05 DIAGNOSIS — I10 ESSENTIAL HYPERTENSION: Primary | ICD-10-CM

## 2018-12-05 DIAGNOSIS — E66.01 SEVERE OBESITY (BMI 35.0-39.9) WITH COMORBIDITY: ICD-10-CM

## 2018-12-05 DIAGNOSIS — E87.6 DIURETIC-INDUCED HYPOKALEMIA: ICD-10-CM

## 2018-12-05 DIAGNOSIS — T50.2X5A DIURETIC-INDUCED HYPOKALEMIA: ICD-10-CM

## 2018-12-05 PROCEDURE — 99999 PR PBB SHADOW E&M-EST. PATIENT-LVL III: CPT | Mod: PBBFAC,,, | Performed by: INTERNAL MEDICINE

## 2018-12-05 PROCEDURE — 99213 OFFICE O/P EST LOW 20 MIN: CPT | Mod: S$GLB,,, | Performed by: INTERNAL MEDICINE

## 2018-12-05 PROCEDURE — 3008F BODY MASS INDEX DOCD: CPT | Mod: CPTII,S$GLB,, | Performed by: INTERNAL MEDICINE

## 2018-12-05 PROCEDURE — 3074F SYST BP LT 130 MM HG: CPT | Mod: CPTII,S$GLB,, | Performed by: INTERNAL MEDICINE

## 2018-12-05 PROCEDURE — 3078F DIAST BP <80 MM HG: CPT | Mod: CPTII,S$GLB,, | Performed by: INTERNAL MEDICINE

## 2018-12-05 RX ORDER — POTASSIUM CHLORIDE 750 MG/1
20 TABLET, EXTENDED RELEASE ORAL 2 TIMES DAILY
Qty: 120 TABLET | Refills: 5 | Status: SHIPPED | OUTPATIENT
Start: 2018-12-05 | End: 2020-01-13

## 2018-12-05 RX ORDER — CARVEDILOL 25 MG/1
25 TABLET ORAL 2 TIMES DAILY WITH MEALS
Qty: 60 TABLET | Refills: 5 | Status: SHIPPED | OUTPATIENT
Start: 2018-12-05 | End: 2020-01-11 | Stop reason: SDUPTHER

## 2018-12-05 RX ORDER — NIFEDIPINE 90 MG/1
90 TABLET, EXTENDED RELEASE ORAL DAILY
Qty: 30 TABLET | Refills: 5 | Status: SHIPPED | OUTPATIENT
Start: 2018-12-05 | End: 2019-10-12 | Stop reason: SDUPTHER

## 2018-12-05 RX ORDER — ATORVASTATIN CALCIUM 20 MG/1
20 TABLET, FILM COATED ORAL DAILY
Qty: 30 TABLET | Refills: 5 | Status: SHIPPED | OUTPATIENT
Start: 2018-12-05 | End: 2020-01-13

## 2018-12-05 RX ORDER — TRIAMTERENE AND HYDROCHLOROTHIAZIDE 37.5; 25 MG/1; MG/1
1 CAPSULE ORAL EVERY MORNING
Qty: 30 CAPSULE | Refills: 5 | Status: SHIPPED | OUTPATIENT
Start: 2018-12-05 | End: 2020-01-13

## 2018-12-05 NOTE — PROGRESS NOTES
Subjective:       Patient ID: Lori Burgess is a 53 y.o. female.    Chief Complaint: Hypertension    Last seen 4 months ago. Hypertension was not at goal, increased Nifedipine XL from 60 to 90mg daily. Returns for f/u c/o mild bilateral lower extremity edema. She is compliant with daily Dyazide, low sodium diet and compression stockings. No new complaints. Recent episode of Flu symptoms x 10 days two weeks ago - did not call for treatment.      PMH: .   Hypertension since age 20.   Hyperlipidemia. TChol 128, TG 91, HDL 40, LDL 70 .  Left Lacunar CVA.  Pre-Diabetes, HbA1c 5.9% .  GERD.   Sigmoid Diverticulosis.   H/O Iron-def. Anemia.   Vitamin D insufficiency.  Obesity.     PSH: BTL, Hysterectomy (benign), ovaries intact. Bilateral Bartholin's cysts.     Mammogram normal , Pap normal 3/17, Pelvic exam . Colonoscopy normal 5/15. Eye exam 2016 at [facility]. Flu shot  - declines repeat. Hep C negative. Exercise Stress Echo  negative, normal LV function.    Social: No tobacco or alcohol. , three adult children all live locally, six grandchildren.  working in the custodial system.     FMH: Hypertension in both parents and siblings. Father  age 50 with complications of Diabetes including heart failure, kidney failure, bilateral amputee. Mother  age 55 with Colon cancer. Heart disease and Diabetes in paternal aunt also.     Allergies: ACE inhibitors, ARBs, Methyldopa, Clonidine.     Medications: list reviewed and reconciled.            Review of Systems   Constitutional: Negative for fatigue and fever.   Respiratory: Negative for cough and shortness of breath.    Cardiovascular: Negative for chest pain, palpitations and leg swelling.   Neurological: Negative for dizziness, syncope, weakness and headaches.       Objective:    /78, Pulse 62, Wt 264 lbs (from 269), BMI=37  Physical Exam   Constitutional: She is oriented to person,  place, and time. She appears well-nourished. No distress.   HENT:   Nose: Nose normal.   Mouth/Throat: Oropharynx is clear and moist.   Eyes: Conjunctivae and EOM are normal.   Cardiovascular: Normal rate, regular rhythm and normal heart sounds.   Pulmonary/Chest: Effort normal and breath sounds normal. No respiratory distress. She has no decreased breath sounds. She has no wheezes. She has no rhonchi. She has no rales.   Musculoskeletal: Normal range of motion.   Trace pretibial edema both lower extremities with no stasis dermatitis or ulceration.   Neurological: She is alert and oriented to person, place, and time.   Skin: Skin is warm and dry.       Assessment:       1. Essential hypertension    2. Diuretic-induced hypokalemia    3. History of stroke    4. Severe obesity (BMI 35.0-39.9) with comorbidity        Plan:       Essential hypertension well controlled, continue same.  -     Ambulatory Referral to Optometry  -     triamterene-hydrochlorothiazide 37.5-25 mg (DYAZIDE) 37.5-25 mg per capsule; Take 1 capsule by mouth every morning.  Dispense: 30 capsule; Refill: 5  -     NIFEdipine (PROCARDIA-XL) 90 MG (OSM) 24 hr tablet; Take 1 tablet (90 mg total) by mouth once daily.  Dispense: 30 tablet; Refill: 5  -     carvedilol (COREG) 25 MG tablet; Take 1 tablet (25 mg total) by mouth 2 (two) times daily with meals.  Dispense: 60 tablet; Refill: 5    Diuretic-induced hypokalemia  -     potassium chloride (KLOR-CON) 10 MEQ TbSR; Take 2 tablets (20 mEq total) by mouth 2 (two) times daily.  Dispense: 120 tablet; Refill: 5    History of stroke  -     atorvastatin (LIPITOR) 20 MG tablet; Take 1 tablet (20 mg total) by mouth once daily.  Dispense: 30 tablet; Refill: 5    Severe obesity (BMI 35.0-39.9) with comorbidity        -     Continue efforts at weight loss to reduce leg swelling and help control blood pressure - may enable us to decreased meds.

## 2018-12-17 ENCOUNTER — PATIENT MESSAGE (OUTPATIENT)
Dept: INTERNAL MEDICINE | Facility: CLINIC | Age: 53
End: 2018-12-17

## 2019-01-09 ENCOUNTER — PATIENT MESSAGE (OUTPATIENT)
Dept: INTERNAL MEDICINE | Facility: CLINIC | Age: 54
End: 2019-01-09

## 2019-05-23 ENCOUNTER — OFFICE VISIT (OUTPATIENT)
Dept: URGENT CARE | Facility: CLINIC | Age: 54
End: 2019-05-23
Payer: COMMERCIAL

## 2019-05-23 VITALS
HEIGHT: 71 IN | SYSTOLIC BLOOD PRESSURE: 130 MMHG | OXYGEN SATURATION: 96 % | BODY MASS INDEX: 37.66 KG/M2 | RESPIRATION RATE: 20 BRPM | WEIGHT: 269 LBS | HEART RATE: 96 BPM | DIASTOLIC BLOOD PRESSURE: 84 MMHG | TEMPERATURE: 98 F

## 2019-05-23 DIAGNOSIS — S80.01XA CONTUSION OF RIGHT KNEE, INITIAL ENCOUNTER: Primary | ICD-10-CM

## 2019-05-23 PROCEDURE — 99214 PR OFFICE/OUTPT VISIT, EST, LEVL IV, 30-39 MIN: ICD-10-PCS | Mod: 25,S$GLB,, | Performed by: FAMILY MEDICINE

## 2019-05-23 PROCEDURE — 73562 XR KNEE 3 VIEW RIGHT: ICD-10-PCS | Mod: RT,S$GLB,, | Performed by: RADIOLOGY

## 2019-05-23 PROCEDURE — 73562 X-RAY EXAM OF KNEE 3: CPT | Mod: RT,S$GLB,, | Performed by: RADIOLOGY

## 2019-05-23 PROCEDURE — 99214 OFFICE O/P EST MOD 30 MIN: CPT | Mod: 25,S$GLB,, | Performed by: FAMILY MEDICINE

## 2019-05-23 PROCEDURE — 96372 PR INJECTION,THERAP/PROPH/DIAG2ST, IM OR SUBCUT: ICD-10-PCS | Mod: S$GLB,,, | Performed by: FAMILY MEDICINE

## 2019-05-23 PROCEDURE — 96372 THER/PROPH/DIAG INJ SC/IM: CPT | Mod: S$GLB,,, | Performed by: FAMILY MEDICINE

## 2019-05-23 RX ORDER — KETOROLAC TROMETHAMINE 30 MG/ML
30 INJECTION, SOLUTION INTRAMUSCULAR; INTRAVENOUS
Status: COMPLETED | OUTPATIENT
Start: 2019-05-23 | End: 2019-05-23

## 2019-05-23 RX ORDER — IBUPROFEN 800 MG/1
800 TABLET ORAL EVERY 8 HOURS PRN
Qty: 30 TABLET | Refills: 1 | Status: SHIPPED | OUTPATIENT
Start: 2019-05-23 | End: 2019-12-21 | Stop reason: ALTCHOICE

## 2019-05-23 RX ADMIN — KETOROLAC TROMETHAMINE 30 MG: 30 INJECTION, SOLUTION INTRAMUSCULAR; INTRAVENOUS at 05:05

## 2019-05-23 NOTE — PATIENT INSTRUCTIONS
Understanding Bone Bruise (Bone Contusion)  A bone bruise is an injury to a bone that is less severe than a bone fracture. Bone bruises are fairly common. They can happen to people of all ages. Any type of bone in your body can be bruised. Other injuries often happen along with a bone bruise, such as damage to nearby ligaments.  What happens when a bone is bruised?  Bone is made of different kinds of tissue. The periosteum is a thin layer of tissue that covers most of a bone. Where bones come together, there is usually a layer of cartilage at the edges. The bone here is called subchondral bone. Deep inside the bone is an area called the medulla. It contains the bone marrow and fibrous tissue called trabeculae.  With a bone fracture, all of the trabeculae in a region of bone have broken. But with a bone bruise, an injury only damages some of these trabeculae. An injury might cause blood to build up in the area beneath the periosteum. This causes a subperiosteal hematoma, a type of bone bruise. An injury might also cause bleeding and swelling in the area between your cartilage and the bone beneath it. This causes a subchondral bone bruise. Or bleeding and swelling can occur in the medulla of your bone. This is called an intraosseous bone bruise.  What causes a bone bruise?  Injury of any kind can cause a bone bruise. Sports injuries, motor vehicle accidents, or falls from a height can cause them. Twisting injuries that cause joint sprains can also cause a bone bruise. Health conditions like arthritis may also lead to a bone bruise. This is because arthritis causes bone surfaces to grind against each other. Child abuse is another cause of bone bruises.  Symptoms of a bone bruise  Symptoms of a bone bruise can include:  · Pain and soreness in the injured area  · Swelling in the area and soft tissues around it  · Change in color of the injured area  · Swelling or stiffness of an injured joint  This pain is often more  severe and lasts longer than a soft tissue injury. How severe your symptoms are and how long they last depends on how severe the bone bruise is.  Diagnosing a bone bruise  Your healthcare provider will ask you about your medical history and symptoms. He or she will ask how you got your injury. Your provider will examine the injured area to check for pain, bruising, and swelling. After the exam, your health care provider may be able to tell if you have a bone bruise.  A bone bruise doesnt show up on an X-ray. But you may be given an X-ray to rule out a bone fracture. A fracture may need a different kind of treatment. An MRI can confirm a bone bruise. But your healthcare provider will likely only give you an MRI if your symptoms dont get better.  Date Last Reviewed: 4/1/2017 © 2000-2017 [employer]. [address] [place] [id number]. All rights reserved. This information is not intended as a substitute for professional medical care. Always follow your healthcare professional's instructions.

## 2019-05-23 NOTE — PROGRESS NOTES
"Subjective:       Patient ID: Lori Burgess is a 53 y.o. female.    Vitals:  height is 5' 11" (1.803 m) and weight is 122 kg (269 lb). Her oral temperature is 97.6 °F (36.4 °C). Her blood pressure is 130/84 and her pulse is 96. Her respiration is 20 and oxygen saturation is 96%.     Chief Complaint: Trauma (Right Knee)    This is a 53 y.o. female who presents today with a chief complaint of right knee pain for 1 week.  Patient states she struck her right knee on a counter at work.  The right knee is swollen and painful.  Pain level is 10/10.  She states the pain is worse with standing and movement.      Trauma   The incident occurred more than 1 week ago (May 16, 2019). The incident occurred at work. The injury mechanism was a crash injury. The injury occurred in the context of other. No protective equipment was used. There is an injury to the right knee. The pain is severe (10/10). It is unlikely that a foreign body is present. Pertinent negatives include no abdominal pain, light-headedness, loss of consciousness or weakness. There have been no prior injuries to these areas.       Constitution: Negative for fatigue.   HENT: Negative for facial swelling and facial trauma.    Neck: Negative for neck stiffness.   Cardiovascular: Negative for chest trauma.   Eyes: Negative for eye trauma, double vision and blurred vision.   Gastrointestinal: Negative for abdominal trauma, abdominal pain and rectal bleeding.   Genitourinary: Negative for hematuria, missed menses, genital trauma and pelvic pain.   Musculoskeletal: Positive for pain, trauma, joint pain and joint swelling. Negative for abnormal ROM of joint.   Skin: Negative for color change, wound, abrasion, laceration and bruising.   Neurological: Negative for dizziness, history of vertigo, light-headedness, coordination disturbances, altered mental status and loss of consciousness.   Hematologic/Lymphatic: Negative for history of bleeding disorder. "   Psychiatric/Behavioral: Negative for altered mental status.       Objective:      Physical Exam   Constitutional: She appears well-developed and well-nourished.   Eyes: Pupils are equal, round, and reactive to light. EOM are normal.   Neck: Normal range of motion. Neck supple.   Cardiovascular: Normal rate, regular rhythm and normal heart sounds.   Musculoskeletal: She exhibits tenderness (right medial above patella).   Nursing note and vitals reviewed.      Assessment:       1. Contusion of right knee, initial encounter        Plan:         Contusion of right knee, initial encounter  -     XR KNEE 3 VIEW RIGHT; Future; Expected date: 05/23/2019  -     ketorolac injection 30 mg  -     KNEE BRACE FOR HOME USE  -     ibuprofen (ADVIL,MOTRIN) 800 MG tablet; Take 1 tablet (800 mg total) by mouth every 8 (eight) hours as needed.  Dispense: 30 tablet; Refill: 1    ice, elevation

## 2019-05-28 ENCOUNTER — OFFICE VISIT (OUTPATIENT)
Dept: ORTHOPEDICS | Facility: CLINIC | Age: 54
End: 2019-05-28
Payer: COMMERCIAL

## 2019-05-28 ENCOUNTER — OFFICE VISIT (OUTPATIENT)
Dept: INTERNAL MEDICINE | Facility: CLINIC | Age: 54
End: 2019-05-28
Payer: COMMERCIAL

## 2019-05-28 VITALS
WEIGHT: 260.56 LBS | DIASTOLIC BLOOD PRESSURE: 79 MMHG | SYSTOLIC BLOOD PRESSURE: 124 MMHG | HEIGHT: 71 IN | HEART RATE: 60 BPM | BODY MASS INDEX: 36.48 KG/M2

## 2019-05-28 VITALS
OXYGEN SATURATION: 99 % | WEIGHT: 255.5 LBS | TEMPERATURE: 98 F | HEART RATE: 66 BPM | HEIGHT: 71 IN | SYSTOLIC BLOOD PRESSURE: 141 MMHG | BODY MASS INDEX: 35.77 KG/M2 | DIASTOLIC BLOOD PRESSURE: 79 MMHG

## 2019-05-28 DIAGNOSIS — S80.01XA CONTUSION OF RIGHT KNEE, INITIAL ENCOUNTER: Primary | ICD-10-CM

## 2019-05-28 DIAGNOSIS — M25.461 EFFUSION, RIGHT KNEE: Primary | ICD-10-CM

## 2019-05-28 PROCEDURE — 3008F PR BODY MASS INDEX (BMI) DOCUMENTED: ICD-10-PCS | Mod: CPTII,S$GLB,, | Performed by: PHYSICIAN ASSISTANT

## 2019-05-28 PROCEDURE — 99213 PR OFFICE/OUTPT VISIT, EST, LEVL III, 20-29 MIN: ICD-10-PCS | Mod: S$GLB,,, | Performed by: INTERNAL MEDICINE

## 2019-05-28 PROCEDURE — 3078F DIAST BP <80 MM HG: CPT | Mod: CPTII,S$GLB,, | Performed by: INTERNAL MEDICINE

## 2019-05-28 PROCEDURE — 3078F PR MOST RECENT DIASTOLIC BLOOD PRESSURE < 80 MM HG: ICD-10-PCS | Mod: CPTII,S$GLB,, | Performed by: INTERNAL MEDICINE

## 2019-05-28 PROCEDURE — 99203 PR OFFICE/OUTPT VISIT, NEW, LEVL III, 30-44 MIN: ICD-10-PCS | Mod: S$GLB,,, | Performed by: PHYSICIAN ASSISTANT

## 2019-05-28 PROCEDURE — 99213 OFFICE O/P EST LOW 20 MIN: CPT | Mod: S$GLB,,, | Performed by: INTERNAL MEDICINE

## 2019-05-28 PROCEDURE — 3074F SYST BP LT 130 MM HG: CPT | Mod: CPTII,S$GLB,, | Performed by: PHYSICIAN ASSISTANT

## 2019-05-28 PROCEDURE — 3077F PR MOST RECENT SYSTOLIC BLOOD PRESSURE >= 140 MM HG: ICD-10-PCS | Mod: CPTII,S$GLB,, | Performed by: INTERNAL MEDICINE

## 2019-05-28 PROCEDURE — 99999 PR PBB SHADOW E&M-EST. PATIENT-LVL III: ICD-10-PCS | Mod: PBBFAC,,, | Performed by: PHYSICIAN ASSISTANT

## 2019-05-28 PROCEDURE — 3074F PR MOST RECENT SYSTOLIC BLOOD PRESSURE < 130 MM HG: ICD-10-PCS | Mod: CPTII,S$GLB,, | Performed by: PHYSICIAN ASSISTANT

## 2019-05-28 PROCEDURE — 99999 PR PBB SHADOW E&M-EST. PATIENT-LVL IV: CPT | Mod: PBBFAC,,, | Performed by: INTERNAL MEDICINE

## 2019-05-28 PROCEDURE — 3077F SYST BP >= 140 MM HG: CPT | Mod: CPTII,S$GLB,, | Performed by: INTERNAL MEDICINE

## 2019-05-28 PROCEDURE — 99999 PR PBB SHADOW E&M-EST. PATIENT-LVL III: CPT | Mod: PBBFAC,,, | Performed by: PHYSICIAN ASSISTANT

## 2019-05-28 PROCEDURE — 99203 OFFICE O/P NEW LOW 30 MIN: CPT | Mod: S$GLB,,, | Performed by: PHYSICIAN ASSISTANT

## 2019-05-28 PROCEDURE — 3008F BODY MASS INDEX DOCD: CPT | Mod: CPTII,S$GLB,, | Performed by: PHYSICIAN ASSISTANT

## 2019-05-28 PROCEDURE — 3078F DIAST BP <80 MM HG: CPT | Mod: CPTII,S$GLB,, | Performed by: PHYSICIAN ASSISTANT

## 2019-05-28 PROCEDURE — 99999 PR PBB SHADOW E&M-EST. PATIENT-LVL IV: ICD-10-PCS | Mod: PBBFAC,,, | Performed by: INTERNAL MEDICINE

## 2019-05-28 PROCEDURE — 3008F PR BODY MASS INDEX (BMI) DOCUMENTED: ICD-10-PCS | Mod: CPTII,S$GLB,, | Performed by: INTERNAL MEDICINE

## 2019-05-28 PROCEDURE — 3008F BODY MASS INDEX DOCD: CPT | Mod: CPTII,S$GLB,, | Performed by: INTERNAL MEDICINE

## 2019-05-28 PROCEDURE — 3078F PR MOST RECENT DIASTOLIC BLOOD PRESSURE < 80 MM HG: ICD-10-PCS | Mod: CPTII,S$GLB,, | Performed by: PHYSICIAN ASSISTANT

## 2019-05-28 RX ORDER — MELOXICAM 15 MG/1
15 TABLET ORAL DAILY
Qty: 30 TABLET | Refills: 0 | Status: SHIPPED | OUTPATIENT
Start: 2019-05-28 | End: 2020-09-02

## 2019-05-28 NOTE — PROGRESS NOTES
SUBJECTIVE:     Chief Complaint & History of Present Illness:  Lori Burgess is a New patient 53 y.o. female who is seen here today with a complaint of    Chief Complaint   Patient presents with    Right Knee - Pain    .  Patient developed sudden onset pain and swelling of the right knee following striking it on the underside of her desk approximately 2 weeks ago.  She has had off and on problems with swelling difficulty with weight-bearing and ambulation since that time.  She has treated conservatively with ice elevation and Tylenol but continues to struggle with dependent edema and swelling at related to standing ambulation difficulty with weight-bearing.  On a scale of 1-10, with 10 being worst pain imaginable, he rates this pain as 5 on good days and 7 on bad days.  she describes the pain as tender and sore.    Review of patient's allergies indicates:   Allergen Reactions    Ace inhibitors Swelling    Benicar [olmesartan] Swelling    Methyldopa Other (See Comments)     sleepy    Clonidine Other (See Comments)     sleepy         Current Outpatient Medications   Medication Sig Dispense Refill    atorvastatin (LIPITOR) 20 MG tablet Take 1 tablet (20 mg total) by mouth once daily. 30 tablet 5    carvedilol (COREG) 25 MG tablet Take 1 tablet (25 mg total) by mouth 2 (two) times daily with meals. 60 tablet 5    meloxicam (MOBIC) 15 MG tablet Take 1 tablet (15 mg total) by mouth once daily. 30 tablet 0    NIFEdipine (PROCARDIA-XL) 90 MG (OSM) 24 hr tablet Take 1 tablet (90 mg total) by mouth once daily. 30 tablet 5    potassium chloride (KLOR-CON) 10 MEQ TbSR Take 2 tablets (20 mEq total) by mouth 2 (two) times daily. 120 tablet 5    triamterene-hydrochlorothiazide 37.5-25 mg (DYAZIDE) 37.5-25 mg per capsule Take 1 capsule by mouth every morning. 30 capsule 5    aspirin (ECOTRIN) 325 MG EC tablet Take 1 tablet (325 mg total) by mouth once daily.  0    ibuprofen (ADVIL,MOTRIN) 800 MG tablet Take 1  "tablet (800 mg total) by mouth every 8 (eight) hours as needed. 30 tablet 1     No current facility-administered medications for this visit.        Past Medical History:   Diagnosis Date    Anemia     Diverticulosis     History of gastroesophageal reflux (GERD)     Hyperlipidemia     Hypertension     Stroke 2015    Trauma 1998    Fractured Right ankle       Past Surgical History:   Procedure Laterality Date    BARTHOLIN GLAND CYST EXCISION      Marsupialization, not excision    COLONOSCOPY N/A 5/28/2015    Performed [facility] Miller [facility] at [facility] (4TH F[facility])    HYSTERECTOMY  10/2006    Laparoscopic Supracervical Hysterectomy    TUBAL LIGATION         Vital Signs (Most Recent)  Vitals:    05/28/19 1307   BP: 124/79   Pulse: 60           Review of Systems:  ROS:  Constitutional: no fever or chills  Eyes: no visual changes  ENT: no nasal congestion or sore throat  Respiratory: no cough or shortness of breath  Cardiovascular: no chest pain or palpitations  Gastrointestinal: no nausea or vomiting, tolerating diet, Positive for GERD, diverticulosis, liver cyst  Genitourinary: no hematuria or dysuria, Hypokalemia positive history of renal cyst  Integument/Breast: no rash or pruritis  Hematologic/Lymphatic: no easy bruising or lymphadenopathy, Anemia  Musculoskeletal: no arthralgias or myalgias, Chronic low back pain  Neurological: no seizures or tremors, Positive history of stroke, cerebral thrombosis, left hemiparesis  Behavioral/Psych: no auditory or visual hallucinations  Endocrine: no heat or cold intolerance, Positive vitamin-D deficiency                OBJECTIVE:     PHYSICAL EXAM:  Height: 5' 11" (180.3 cm) Weight: 118.2 kg (260 lb 9.3 oz), General Appearance: Well nourished, well developed, in no acute distress.  Neurological: Mood & affect are normal.  right  Knee Exam:  Knee Range of Motion:0-110 degrees flexion   Effusion:  Mild  Condition of skin:intact  Location of tenderness:Medial joint line and " Patellar tendon   Strength:limited by pain and 5 of 5  Stability:  Lachman: stable, LCL: stable, MCL: stable, PCL: stable and posteromedial (dial): stable  Varus /Valgus stress:  normal  Artie:   negative/negative    left  Knee Exam:  Knee Range of Motion:0-125 degrees flexion   Effusion:none  Condition of skin:intact  Location of tenderness:None   Strength:5 of 5  Stability:  Lachman: stable, LCL: stable, MCL: stable, PCL: stable and posteromedial (dial): stable  Varus /Valgus stress:  normal  Artie:   negative/negative      Hip Examination:  normal    RADIOGRAPHS:  X-rays from previous visit reviewed by me today demonstrate no evidence of fracture dislocation about the knee joint spaces well maintained minimal degenerative arthritic changes    ASSESSMENT/PLAN:       ICD-10-CM ICD-9-CM   1. Contusion of right knee, initial encounter S80.01XA 924.11       Plan: We discussed with the patient at length all the different treatment options available for  the knee including anti-inflammatories, acetaminophen, rest, ice, knee strengthening exercise, occasional cortisone injections for temporary relief, Viscosupplimentation injections, arthroscopic debridement osteotomy, and finally knee arthroplasty.   Meloxicam 15 mg q.d. with food times 10 days followed by p.r.n.  Continue with ice compression and elevation  If symptoms not significantly improved in 10 days consider aspiration and injection therapy followed by PT

## 2019-05-28 NOTE — LETTER
May 28, 2019      Syeda Chang MD  [address]  [facility] [address]           [facility] - Orthopedics  [address]  [facility] [address]  Phone: [phone]          Patient: Lori Burgess   MR Number: [id number]   YOB: 1965   Date of Visit: 5/28/2019       Dear Dr. Syeda Chang:    Thank you for referring Lori Burgess to me for evaluation. Attached you will find relevant portions of my assessment and plan of care.    If you have questions, please do not hesitate to call me. I look forward to following Lori Burgess along with you.    Sincerely,    Jeremías Cardozo PA-C    Enclosure  CC:  No Recipients    If you would like to receive this communication electronically, please contact [email or web address] or [phone] to request more information on [employer] access.    For providers and/or their staff who would like to refer a patient to [facility], please contact us through our one-stop-shop provider referral line, [facility], at [phone].    If you feel you have received this communication in error or would no longer like to receive these types of communications, please e-mail [email or web address]

## 2019-05-28 NOTE — PROGRESS NOTES
Subjective:       Patient ID: Lori Burgess is a 53 y.o. female.    Chief Complaint: Knee Pain (injury)    3 weeks ago, patient struck her medial right knee against a desk twice.  A week later it swelled and became very painful so she went to urgent care.  Xray's showed no fracture or effusion.  She was told she has a contusion and given 800 mg ibuprofen which she has not taken.  Swelling has decreased, but not gone.  Knee is still painful, worse with standing and walking    Review of Systems   Constitutional: Negative for activity change, chills, fatigue and fever.   HENT: Negative for congestion, ear pain, nosebleeds, postnasal drip, sinus pressure and sore throat.    Eyes: Negative.  Negative for visual disturbance.   Respiratory: Negative for cough, chest tightness, shortness of breath and wheezing.    Cardiovascular: Negative for chest pain.   Gastrointestinal: Negative for abdominal pain, diarrhea, nausea and vomiting.   Genitourinary: Negative for difficulty urinating, dysuria, frequency and urgency.   Musculoskeletal: Negative for arthralgias and neck stiffness.   Skin: Negative for rash.   Neurological: Negative for dizziness, weakness and headaches.   Psychiatric/Behavioral: Negative for sleep disturbance. The patient is not nervous/anxious.        Objective:      Physical Exam   Musculoskeletal:        Right knee: She exhibits effusion and MCL laxity. She exhibits normal range of motion, no swelling, no ecchymosis, no deformity, no laceration, no erythema, normal alignment and no LCL laxity. Tenderness found. Medial joint line tenderness noted.       Assessment:       1. Effusion, right knee        Plan:   Lori was seen today for knee pain.    Diagnoses and all orders for this visit:    Effusion, right knee  -     Ambulatory referral to Orthopedics    Other orders  -     meloxicam (MOBIC) 15 MG tablet; Take 1 tablet (15 mg total) by mouth once daily.

## 2019-05-28 NOTE — LETTER
May 28, 2019      [facility]  [address]  [facility] [address]  Phone: [phone]  Fax: [phone]       Patient: Lori Burgess   YOB: 1965  Date of Visit: 05/28/2019    To Whom It May Concern:    Rodrigo Burgess  was at [facility] on 05/28/2019.  She  may return to work on 0529/2019 with no restrictions. If you have any questions or concerns, or if I can be of further assistance, please do not hesitate to contact me.    Sincerely,      Ora Rodriges MA

## 2019-06-07 ENCOUNTER — PATIENT MESSAGE (OUTPATIENT)
Dept: ORTHOPEDICS | Facility: CLINIC | Age: 54
End: 2019-06-07

## 2019-07-31 ENCOUNTER — OFFICE VISIT (OUTPATIENT)
Dept: URGENT CARE | Facility: CLINIC | Age: 54
End: 2019-07-31
Payer: COMMERCIAL

## 2019-07-31 VITALS
WEIGHT: 260 LBS | RESPIRATION RATE: 16 BRPM | OXYGEN SATURATION: 98 % | TEMPERATURE: 98 F | DIASTOLIC BLOOD PRESSURE: 79 MMHG | SYSTOLIC BLOOD PRESSURE: 123 MMHG | HEIGHT: 71 IN | HEART RATE: 66 BPM | BODY MASS INDEX: 36.4 KG/M2

## 2019-07-31 PROCEDURE — 3008F BODY MASS INDEX DOCD: CPT | Mod: CPTII,S$GLB,, | Performed by: PHYSICIAN ASSISTANT

## 2019-07-31 PROCEDURE — 3074F SYST BP LT 130 MM HG: CPT | Mod: CPTII,S$GLB,, | Performed by: PHYSICIAN ASSISTANT

## 2019-07-31 PROCEDURE — 73000 X-RAY EXAM OF COLLAR BONE: CPT | Mod: FY,RT,S$GLB, | Performed by: RADIOLOGY

## 2019-07-31 PROCEDURE — 3008F PR BODY MASS INDEX (BMI) DOCUMENTED: ICD-10-PCS | Mod: CPTII,S$GLB,, | Performed by: PHYSICIAN ASSISTANT

## 2019-07-31 PROCEDURE — 3078F DIAST BP <80 MM HG: CPT | Mod: CPTII,S$GLB,, | Performed by: PHYSICIAN ASSISTANT

## 2019-07-31 PROCEDURE — 99214 PR OFFICE/OUTPT VISIT, EST, LEVL IV, 30-39 MIN: ICD-10-PCS | Mod: S$GLB,,, | Performed by: PHYSICIAN ASSISTANT

## 2019-07-31 PROCEDURE — 99214 OFFICE O/P EST MOD 30 MIN: CPT | Mod: S$GLB,,, | Performed by: PHYSICIAN ASSISTANT

## 2019-07-31 PROCEDURE — 70360 X-RAY EXAM OF NECK: CPT | Mod: FY,S$GLB,, | Performed by: RADIOLOGY

## 2019-07-31 PROCEDURE — 3078F PR MOST RECENT DIASTOLIC BLOOD PRESSURE < 80 MM HG: ICD-10-PCS | Mod: CPTII,S$GLB,, | Performed by: PHYSICIAN ASSISTANT

## 2019-07-31 PROCEDURE — 70360 XR NECK SOFT TISSUE: ICD-10-PCS | Mod: FY,S$GLB,, | Performed by: RADIOLOGY

## 2019-07-31 PROCEDURE — 3074F PR MOST RECENT SYSTOLIC BLOOD PRESSURE < 130 MM HG: ICD-10-PCS | Mod: CPTII,S$GLB,, | Performed by: PHYSICIAN ASSISTANT

## 2019-07-31 PROCEDURE — 73000 XR CLAVICLE RIGHT: ICD-10-PCS | Mod: FY,RT,S$GLB, | Performed by: RADIOLOGY

## 2019-07-31 NOTE — PATIENT INSTRUCTIONS
No acute abnormalities noted.  Likely soft tissue in origin.  Use ice and anti-inflammatories as discussed in follow-up your primary care physician if worsening or not resolved for further evaluation.     Please follow up with your Primary care provider within 2-5 days if your signs and symptoms have not resolved or worsen.     If your condition worsens or fails to improve we recommend that you receive another evaluation at the emergency room immediately or contact your primary medical clinic to discuss your concerns.   You must understand that you have received an Urgent Care treatment only and that you may be released before all of your medical problems are known or treated. You, the patient, will arrange for follow up care as instructed.     RED FLAGS/WARNING SYMPTOMS DISCUSSED WITH PATIENT THAT WOULD WARRANT EMERGENT MEDICAL ATTENTION. PATIENT VERBALIZED UNDERSTANDING.

## 2019-07-31 NOTE — PROGRESS NOTES
"Subjective:       Patient ID: Lori Burgess is a 53 y.o. female.    Vitals:  height is 5' 11" (1.803 m) and weight is 117.9 kg (260 lb). Her oral temperature is 98.3 °F (36.8 °C). Her blood pressure is 123/79 and her pulse is 66. Her respiration is 16 and oxygen saturation is 98%.     Chief Complaint: Mass    Pt presents with a palpable mass to anterior neck.  Pt noticed mass on Monday when someone pointed it out to her.  Pt is asymptomatic apart from abscess to left axilla that became bothersome two days ago.    Mass   This is a new problem. The current episode started in the past 7 days. The problem occurs constantly. The problem has been unchanged. Pertinent negatives include no abdominal pain, anorexia, arthralgias, change in bowel habit, chest pain, chills, congestion, coughing, diaphoresis, fatigue, fever, headaches, joint swelling, myalgias, nausea, neck pain, numbness, rash, sore throat, swollen glands, urinary symptoms, vertigo, visual change, vomiting or weakness. Nothing aggravates the symptoms. She has tried nothing for the symptoms. The treatment provided no relief.       Constitution: Negative for chills, sweating, fatigue and fever.   HENT: Negative for congestion and sore throat.    Neck: Negative for neck pain and painful lymph nodes.   Cardiovascular: Negative for chest pain and leg swelling.   Eyes: Negative for double vision and blurred vision.   Respiratory: Negative for cough and shortness of breath.    Gastrointestinal: Negative for abdominal pain, nausea, vomiting and diarrhea.   Genitourinary: Negative for dysuria, frequency, urgency and history of kidney stones.   Musculoskeletal: Negative for joint pain, joint swelling, muscle cramps and muscle ache.   Skin: Negative for color change, pale, rash and bruising.   Allergic/Immunologic: Negative for seasonal allergies.   Neurological: Negative for dizziness, history of vertigo, light-headedness, passing out, headaches and numbness. "   Hematologic/Lymphatic: Negative for swollen lymph nodes.   Psychiatric/Behavioral: Negative for nervous/anxious, sleep disturbance and depression. The patient is not nervous/anxious.        Objective:      Physical Exam   Constitutional: She is oriented to person, place, and time. She appears well-developed and well-nourished. She is cooperative.  Non-toxic appearance. She does not appear ill. No distress.   HENT:   Head: Normocephalic and atraumatic.   Right Ear: Hearing, tympanic membrane, external ear and ear canal normal.   Left Ear: Hearing, tympanic membrane, external ear and ear canal normal.   Nose: Nose normal. No mucosal edema, rhinorrhea or nasal deformity. No epistaxis. Right sinus exhibits no maxillary sinus tenderness and no frontal sinus tenderness. Left sinus exhibits no maxillary sinus tenderness and no frontal sinus tenderness.   Mouth/Throat: Uvula is midline, oropharynx is clear and moist and mucous membranes are normal. No trismus in the jaw. Normal dentition. No uvula swelling. No posterior oropharyngeal erythema.   Eyes: Conjunctivae and lids are normal. No scleral icterus.   Sclera clear bilat   Neck: Trachea normal, normal range of motion, full passive range of motion without pain and phonation normal. Neck supple. Normal carotid pulses, no hepatojugular reflux and no JVD present. No tracheal tenderness, no spinous process tenderness and no muscular tenderness present. Carotid bruit is not present. No neck rigidity. No tracheal deviation, no edema, no erythema and normal range of motion present. No Brudzinski's sign and no Kernig's sign noted. No thyroid mass and no thyromegaly present.       Cardiovascular: Normal rate, regular rhythm, normal heart sounds, intact distal pulses and normal pulses. PMI is not displaced.   Pulses:       Carotid pulses are 2+ on the right side, and 2+ on the left side.       Radial pulses are 2+ on the right side, and 2+ on the left side.   Pulmonary/Chest:  Effort normal and breath sounds normal. No accessory muscle usage or stridor. No respiratory distress. She has no decreased breath sounds. She has no wheezes. She has no rhonchi. She has no rales.   Abdominal: Soft. Normal appearance and bowel sounds are normal. She exhibits no distension. There is no tenderness.   Musculoskeletal: Normal range of motion. She exhibits no edema or deformity.   Neurological: She is alert and oriented to person, place, and time. She exhibits normal muscle tone. Coordination normal.   Skin: Skin is warm, dry and intact. She is not diaphoretic. No pallor.   Psychiatric: She has a normal mood and affect. Her speech is normal and behavior is normal. Judgment and thought content normal. Cognition and memory are normal.   Nursing note and vitals reviewed.        XRAY Clavicle: No acute osseous abnormalities noted    XRAY neck: No acute abnormality noted      Assessment:       1. Mass        Plan:         Mass  -     X-Ray Clavicle Right; Future; Expected date: 07/31/2019  -     X-Ray Neck Soft Tissue; Future; Expected date: 07/31/2019        No acute abnormalities noted.  Likely soft tissue in origin.  Use ice and anti-inflammatories as discussed in follow-up your primary care physician if worsening or not resolved for further evaluation.     Please follow up with your Primary care provider within 2-5 days if your signs and symptoms have not resolved or worsen.     If your condition worsens or fails to improve we recommend that you receive another evaluation at the emergency room immediately or contact your primary medical clinic to discuss your concerns.   You must understand that you have received an Urgent Care treatment only and that you may be released before all of your medical problems are known or treated. You, the patient, will arrange for follow up care as instructed.     RED FLAGS/WARNING SYMPTOMS DISCUSSED WITH PATIENT THAT WOULD WARRANT EMERGENT MEDICAL ATTENTION. PATIENT  VERBALIZED UNDERSTANDING.

## 2019-10-12 DIAGNOSIS — I10 ESSENTIAL HYPERTENSION: ICD-10-CM

## 2019-10-13 RX ORDER — NIFEDIPINE 90 MG/1
TABLET, EXTENDED RELEASE ORAL
Qty: 90 TABLET | Refills: 0 | Status: SHIPPED | OUTPATIENT
Start: 2019-10-13 | End: 2020-01-11 | Stop reason: SDUPTHER

## 2019-12-20 ENCOUNTER — HOSPITAL ENCOUNTER (EMERGENCY)
Facility: HOSPITAL | Age: 54
Discharge: HOME | End: 2019-12-21
Attending: EMERGENCY MEDICINE

## 2019-12-20 DIAGNOSIS — M54.50 LUMBAR BACK PAIN: Primary | ICD-10-CM

## 2019-12-20 DIAGNOSIS — M62.838 MUSCLE SPASM: ICD-10-CM

## 2019-12-20 PROCEDURE — 99284 EMERGENCY DEPT VISIT MOD MDM: CPT | Mod: 25

## 2019-12-20 PROCEDURE — 96372 THER/PROPH/DIAG INJ SC/IM: CPT

## 2019-12-21 VITALS
DIASTOLIC BLOOD PRESSURE: 92 MMHG | BODY MASS INDEX: 35 KG/M2 | TEMPERATURE: 98 F | HEART RATE: 60 BPM | SYSTOLIC BLOOD PRESSURE: 158 MMHG | WEIGHT: 250 LBS | HEIGHT: 71 IN | RESPIRATION RATE: 14 BRPM | OXYGEN SATURATION: 99 %

## 2019-12-21 PROCEDURE — 25000003 PHARM REV CODE 250: Performed by: EMERGENCY MEDICINE

## 2019-12-21 PROCEDURE — 63600175 PHARM REV CODE 636 W HCPCS: Performed by: EMERGENCY MEDICINE

## 2019-12-21 RX ORDER — KETOROLAC TROMETHAMINE 30 MG/ML
30 INJECTION, SOLUTION INTRAMUSCULAR; INTRAVENOUS
Status: COMPLETED | OUTPATIENT
Start: 2019-12-21 | End: 2019-12-21

## 2019-12-21 RX ORDER — NAPROXEN 500 MG/1
500 TABLET ORAL 2 TIMES DAILY PRN
Qty: 14 TABLET | Refills: 0 | Status: SHIPPED | OUTPATIENT
Start: 2019-12-21 | End: 2020-05-20

## 2019-12-21 RX ORDER — LORAZEPAM 2 MG/ML
INJECTION INTRAMUSCULAR
Status: DISCONTINUED
Start: 2019-12-21 | End: 2019-12-21 | Stop reason: HOSPADM

## 2019-12-21 RX ORDER — DIAZEPAM 5 MG/1
5 TABLET ORAL
Status: COMPLETED | OUTPATIENT
Start: 2019-12-21 | End: 2019-12-21

## 2019-12-21 RX ORDER — METHOCARBAMOL 500 MG/1
1000 TABLET, FILM COATED ORAL 3 TIMES DAILY
Qty: 30 TABLET | Refills: 0 | Status: SHIPPED | OUTPATIENT
Start: 2019-12-21 | End: 2019-12-26

## 2019-12-21 RX ADMIN — KETOROLAC TROMETHAMINE 30 MG: 30 INJECTION, SOLUTION INTRAMUSCULAR at 01:12

## 2019-12-21 RX ADMIN — DIAZEPAM 5 MG: 5 TABLET ORAL at 01:12

## 2019-12-21 NOTE — DISCHARGE INSTRUCTIONS
Additional instructions  Followup with your primary care physician in 3 days if you are not improving. Take all your medications as prescribed. Return to the emergency department if you have increasing pain, cannot control your bowel movements or your urination, have numb legs, chest pain, difficulty breathing,  nonstop vomiting, or any other concerns. Be sure to drink plenty of fluids to stay hydrated. Get plenty of rest. Please refer to additional educational material for further instructions.

## 2019-12-21 NOTE — ED TRIAGE NOTES
Lower back pain that started after lifting heavy boxes at work around noon yesterday. Patient has been treating symptoms with Aleve. No existing back pain.  Pain rated 10/10    Review of patient's allergies indicates:   Allergen Reactions    Ace inhibitors Swelling    Benicar [olmesartan] Swelling    Methyldopa Other (See Comments)     sleepy    Clonidine Other (See Comments)     sleepy        Patient has verified the spelling of their name and  on armband.   APPEARANCE: Patient is alert, calm, oriented x 4, and does not appear distressed.  SKIN: Skin is normal for race, warm, and dry. Normal skin turgor and mucous membranes moist.  CARDIAC: Normal rate and rhythm, no murmur heard.   RESPIRATORY:Normal rate and effort. Breath sounds clear bilaterally throughout chest. Respirations are equal and unlabored.    MUSCLE: Full ROM. No bony tenderness or soft tissue tenderness. No obvious deformity. +lower back pain rated 10/10

## 2019-12-21 NOTE — ED PROVIDER NOTES
Encounter Date: 12/20/2019    SCRIBE #1 NOTE: I, Mallory Angeles, am scribing for, and in the presence of,  Dr. Colón. I have scribed the entire note.     I, Dr. Marcos Colón MD, personally performed the services described in this documentation. All medical record entries made by the scribe were at my direction and in my presence.  I have reviewed the chart and agree that the record reflects my personal performance and is accurate and complete. Marcos Colón MD.    History     Chief Complaint   Patient presents with    Back Pain     low back spasms and pain after lifting a heavy box at work at 11:30 am today. Pt took 6 Alleve tablets at noon today     CHIEF COMPLAINT: Patient presents with low back pain.    HISTORY OF PRESENT ILLNESS: Lori Burgess who is a 54 y.o. presents to the emergency department today with complaint of low back pain. She reports onset of symptoms was about 13 hrs ago. The patient was at work when she moved a heavy box. Shortly there after the patient began with low back pain. She describes the pain as a muscle spasm. The patient denies any associated numbness, tingling or weakness in the legs, loss of bowel/bladder control or genital numbness. She has been using Aleve with minimal improvement.     ALLERGIES REVIEWED  MEDICATIONS REVIEWED  PMH/PSH/SOC/FH REVIEWED     The history is provided by the patient.    Nursing/Ancillary staff note reviewed.        The history is provided by the patient.     Review of patient's allergies indicates:   Allergen Reactions    Ace inhibitors Swelling    Benicar [olmesartan] Swelling    Methyldopa Other (See Comments)     sleepy    Clonidine Other (See Comments)     sleepy     Past Medical History:   Diagnosis Date    Anemia     Diverticulosis     History of gastroesophageal reflux (GERD)     Hyperlipidemia     Hypertension     Stroke 2015    Trauma 1998    Fractured Right ankle     Past Surgical History:   Procedure Laterality Date     BARTHOLIN GLAND CYST EXCISION      Marsupialization, not excision    HYSTERECTOMY  10/2006    Laparoscopic Supracervical Hysterectomy    TUBAL LIGATION       Family History   Problem Relation Age of Onset    Colon cancer Mother 55    Hypertension Mother     Diabetes Father     Hypertension Father     Hypertension Brother     Hypertension Sister     Heart disease Neg Hx     Heart attack Neg Hx     Breast cancer Neg Hx     Ovarian cancer Neg Hx      Social History     Tobacco Use    Smoking status: Never Smoker    Smokeless tobacco: Never Used   Substance Use Topics    Alcohol use: Not Currently    Drug use: No     Review of Systems   Constitutional: Negative for activity change, appetite change, chills, diaphoresis and fever.   HENT: Negative for congestion, drooling, ear pain, mouth sores, rhinorrhea, sinus pain, sore throat and trouble swallowing.    Eyes: Negative for pain and discharge.   Respiratory: Negative for cough, chest tightness, shortness of breath, wheezing and stridor.    Cardiovascular: Negative for chest pain, palpitations and leg swelling.   Gastrointestinal: Negative for abdominal distention, abdominal pain, blood in stool, constipation, diarrhea, nausea and vomiting.   Genitourinary: Negative for difficulty urinating, dysuria, flank pain, frequency, hematuria and urgency.   Musculoskeletal: Positive for back pain. Negative for arthralgias and myalgias.   Skin: Negative for pallor, rash and wound.   Neurological: Negative for dizziness, syncope, weakness, light-headedness and numbness.   All other systems reviewed and are negative.      Physical Exam     Initial Vitals [12/20/19 2236]   BP Pulse Resp Temp SpO2   (!) 162/85 65 16 97.7 °F (36.5 °C) 99 %      MAP       --         Physical Exam    Nursing note and vitals reviewed.  Constitutional: She appears well-developed and well-nourished.   HENT:   Head: Normocephalic and atraumatic.   Right Ear: External ear normal.   Left Ear:  External ear normal.   Nose: Nose normal.   Mouth/Throat: Oropharynx is clear and moist.   Eyes: Conjunctivae and EOM are normal. Pupils are equal, round, and reactive to light. No scleral icterus.   Neck: Normal range of motion. Neck supple. No JVD present.   Cardiovascular: Normal rate, regular rhythm, normal heart sounds and intact distal pulses. Exam reveals no gallop and no friction rub.    No murmur heard.  Pulmonary/Chest: Breath sounds normal. No stridor. No respiratory distress. She has no wheezes. She exhibits no tenderness.   Abdominal: Soft. Bowel sounds are normal. She exhibits no distension and no mass. There is no tenderness. There is no rebound and no guarding.   Musculoskeletal: Normal range of motion. She exhibits tenderness. She exhibits no edema.   Right low lumbar paraspinal tenderness. No midline spinal tenderness. Positive straight leg raise on the right   Neurological: She is alert and oriented to person, place, and time. She has normal strength. No cranial nerve deficit.   Skin: Skin is warm and dry. Capillary refill takes less than 2 seconds. No rash noted. No pallor.   Psychiatric: She has a normal mood and affect. Thought content normal.         ED Course   Procedures  Medical Decision Making:   History:   Old Medical Records: I decided to obtain old medical records.  Differential Diagnosis:   Muscular pain, herniated disc, spine fracture, intra-abdominal causes and urinary tract infection, dehydration.     ED Management:  oLri Burgess presents with back pain secondary to heavy lifting.  No red flags on presentation or physical exam. Unlikely to be spinal stenosis as there are no neurologic findings, does not appear to be infectious given no fever, no point tenderness, could be DJD or disc herniation but no xray warranted at this time. Likely musculoskeletal pain. Pt has obtained significant improvement with meds here in the ED. She is ready to go home. I will d/c home with  anti-inflammatories and muscle relaxer. Will instruct pt to follow up with PCP in one week if symptoms do not improve. We disscused at length warning signs for return including but not limited to increased pain, change in gait, sensation changes around the rectum or perineum, bowel or bladder issues, fever and the pt understands. The pt is comfortable going home at this time. After taking into careful account the historical factors and physical exam findings of the patient's presentation today, in conjunction with the empirical and objective data obtained on ED workup, no acute emergent medical condition requiring admission has been identified. The patient appears to be low risk for an emergent medical condition and I feel it is safe and appropriate at this time for the patient to be discharged to follow-up as detailed in their discharge instructions for reevaluation and possible continued outpatient workup and management. I have discussed the specifics of the workup with the patient and the patient has verbalized understanding of the details of the workup, the diagnosis, the treatment plan, and the need for outpatient follow-up.  Although the patient has no emergent etiology today this does not preclude the development of an emergent condition so in addition, I have advised the patient that they can return to the ED and/or activate EMS at any time with worsening of their symptoms, change of their symptoms, or with any other medical complaint.  The patient remained comfortable and stable during their visit in the ED.  Discharge and follow-up instructions discussed with the patient who expressed understanding and willingness to comply with my recommendations.                        ED Course as of Dec 21 0152   Sat Dec 21, 2019   0146 Pt has had relief of her pain here in the ED. She would like to go home.     [JA]      ED Course User Index  [JA] Marcos Colón MD                Clinical Impression:     1. Lumbar  back pain    2. Muscle spasm                            Marcos Colón MD  12/21/19 9888

## 2019-12-21 NOTE — ED NOTES
Pt educated on possible IM injection reaction symptoms. Call light within reach. Family member at bedside.

## 2020-01-11 DIAGNOSIS — E87.6 DIURETIC-INDUCED HYPOKALEMIA: ICD-10-CM

## 2020-01-11 DIAGNOSIS — Z86.73 HISTORY OF STROKE: ICD-10-CM

## 2020-01-11 DIAGNOSIS — T50.2X5A DIURETIC-INDUCED HYPOKALEMIA: ICD-10-CM

## 2020-01-11 DIAGNOSIS — I10 ESSENTIAL HYPERTENSION: ICD-10-CM

## 2020-01-13 RX ORDER — ATORVASTATIN CALCIUM 20 MG/1
TABLET, FILM COATED ORAL
Qty: 90 TABLET | Refills: 0 | Status: SHIPPED | OUTPATIENT
Start: 2020-01-13 | End: 2020-05-11 | Stop reason: SDUPTHER

## 2020-01-13 RX ORDER — NIFEDIPINE 90 MG/1
90 TABLET, EXTENDED RELEASE ORAL DAILY
Qty: 90 TABLET | Refills: 0 | Status: SHIPPED | OUTPATIENT
Start: 2020-01-13 | End: 2020-05-11 | Stop reason: SDUPTHER

## 2020-01-13 RX ORDER — TRIAMTERENE AND HYDROCHLOROTHIAZIDE 37.5; 25 MG/1; MG/1
CAPSULE ORAL
Qty: 90 CAPSULE | Refills: 0 | Status: SHIPPED | OUTPATIENT
Start: 2020-01-13 | End: 2020-05-11 | Stop reason: SDUPTHER

## 2020-01-13 RX ORDER — CARVEDILOL 25 MG/1
25 TABLET ORAL 2 TIMES DAILY WITH MEALS
Qty: 180 TABLET | Refills: 0 | Status: SHIPPED | OUTPATIENT
Start: 2020-01-13 | End: 2020-09-02 | Stop reason: SDUPTHER

## 2020-01-13 RX ORDER — POTASSIUM CHLORIDE 750 MG/1
TABLET, EXTENDED RELEASE ORAL
Qty: 360 TABLET | Refills: 0 | Status: SHIPPED | OUTPATIENT
Start: 2020-01-13 | End: 2020-08-02

## 2020-03-17 ENCOUNTER — PATIENT MESSAGE (OUTPATIENT)
Dept: PRIMARY CARE CLINIC | Facility: CLINIC | Age: 55
End: 2020-03-17

## 2020-03-31 ENCOUNTER — PATIENT MESSAGE (OUTPATIENT)
Dept: FAMILY MEDICINE | Facility: CLINIC | Age: 55
End: 2020-03-31

## 2020-05-11 DIAGNOSIS — I10 ESSENTIAL HYPERTENSION: ICD-10-CM

## 2020-05-11 DIAGNOSIS — Z86.73 HISTORY OF STROKE: ICD-10-CM

## 2020-05-13 RX ORDER — ATORVASTATIN CALCIUM 20 MG/1
20 TABLET, FILM COATED ORAL DAILY
Qty: 90 TABLET | Refills: 0 | Status: SHIPPED | OUTPATIENT
Start: 2020-05-13 | End: 2020-09-02 | Stop reason: SDUPTHER

## 2020-05-13 RX ORDER — TRIAMTERENE AND HYDROCHLOROTHIAZIDE 37.5; 25 MG/1; MG/1
CAPSULE ORAL
Qty: 90 CAPSULE | Refills: 0 | OUTPATIENT
Start: 2020-05-13

## 2020-05-13 RX ORDER — TRIAMTERENE AND HYDROCHLOROTHIAZIDE 37.5; 25 MG/1; MG/1
1 CAPSULE ORAL DAILY
Qty: 90 CAPSULE | Refills: 0 | Status: SHIPPED | OUTPATIENT
Start: 2020-05-13 | End: 2020-08-04 | Stop reason: SDUPTHER

## 2020-05-13 RX ORDER — NIFEDIPINE 90 MG/1
90 TABLET, EXTENDED RELEASE ORAL DAILY
Qty: 90 TABLET | Refills: 0 | Status: SHIPPED | OUTPATIENT
Start: 2020-05-13 | End: 2020-10-07

## 2020-05-13 RX ORDER — ATORVASTATIN CALCIUM 20 MG/1
TABLET, FILM COATED ORAL
Qty: 90 TABLET | Refills: 0 | OUTPATIENT
Start: 2020-05-13

## 2020-05-20 ENCOUNTER — HOSPITAL ENCOUNTER (EMERGENCY)
Facility: HOSPITAL | Age: 55
Discharge: HOME | End: 2020-05-20
Attending: EMERGENCY MEDICINE
Payer: COMMERCIAL

## 2020-05-20 VITALS
TEMPERATURE: 98 F | OXYGEN SATURATION: 98 % | HEART RATE: 64 BPM | SYSTOLIC BLOOD PRESSURE: 152 MMHG | WEIGHT: 200 LBS | BODY MASS INDEX: 28 KG/M2 | RESPIRATION RATE: 18 BRPM | DIASTOLIC BLOOD PRESSURE: 84 MMHG | HEIGHT: 71 IN

## 2020-05-20 DIAGNOSIS — S32.030A CLOSED COMPRESSION FRACTURE OF L3 LUMBAR VERTEBRA, INITIAL ENCOUNTER: Primary | ICD-10-CM

## 2020-05-20 PROCEDURE — 99284 EMERGENCY DEPT VISIT MOD MDM: CPT | Mod: 25,ER

## 2020-05-20 RX ORDER — METHOCARBAMOL 750 MG/1
750 TABLET, FILM COATED ORAL 3 TIMES DAILY PRN
Qty: 30 TABLET | Refills: 0 | Status: SHIPPED | OUTPATIENT
Start: 2020-05-20 | End: 2020-05-30

## 2020-05-20 RX ORDER — HYDROCODONE BITARTRATE AND ACETAMINOPHEN 5; 325 MG/1; MG/1
1 TABLET ORAL EVERY 6 HOURS PRN
Qty: 10 TABLET | Refills: 0 | Status: SHIPPED | OUTPATIENT
Start: 2020-05-20 | End: 2020-05-23

## 2020-05-20 NOTE — ED PROVIDER NOTES
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  Pt denies hitting head, denies LOC.  Pt c/o pain to lower back.       Patient is a 54 year old female presenting with constant, moderate aching pain in the low back secondary to falling off of a porch about 3 ft onto her back. She was working as a  delivering packages and a little girl opened the door and the dogs rushed out the door, jumped on her and she fell off the porch. She states the dogs were biting her shoes, but she does not have any dog bites on the skin. The back pain is worse with movement. It does not radiate. No numbness, focal weakness, urinary/fecal incontinence, fever or chills.         Review of patient's allergies indicates:   Allergen Reactions    Ace inhibitors Swelling    Benicar [olmesartan] Swelling    Methyldopa Other (See Comments)     sleepy    Clonidine Other (See Comments)     sleepy     Past Medical History:   Diagnosis Date    Anemia     Diverticulosis     History of gastroesophageal reflux (GERD)     Hyperlipidemia     Hypertension     Stroke 2015    Trauma 1998    Fractured Right ankle     Past Surgical History:   Procedure Laterality Date    BARTHOLIN GLAND CYST EXCISION      Marsupialization, not excision    HYSTERECTOMY  10/2006    Laparoscopic Supracervical Hysterectomy    TUBAL LIGATION       Family History   Problem Relation Age of Onset    Colon cancer Mother 55    Hypertension Mother     Diabetes Father     Hypertension Father     Hypertension Brother     Hypertension Sister     Heart disease Neg Hx     Heart attack Neg Hx     Breast cancer Neg Hx     Ovarian cancer Neg Hx      Social History     Tobacco Use    Smoking status: Never Smoker    Smokeless tobacco: Never Used   Substance Use Topics    Alcohol use: Not Currently    Drug use: No     Review of Systems    Constitutional: Negative for activity change, appetite change, chills, fatigue and fever.   HENT: Negative for congestion, ear pain, rhinorrhea, sore throat and voice change.    Respiratory: Negative for cough, shortness of breath and wheezing.    Cardiovascular: Negative for chest pain.   Gastrointestinal: Negative for abdominal pain, diarrhea, nausea and vomiting.   Genitourinary: Negative for dysuria, flank pain, frequency and hematuria.   Musculoskeletal: Positive for back pain. Negative for neck pain and neck stiffness.   Skin: Negative for rash.   Neurological: Negative for dizziness, weakness, numbness and headaches.   All other systems reviewed and are negative.      Physical Exam     Initial Vitals [05/20/20 1637]   BP Pulse Resp Temp SpO2   (!) 170/87 68 18 98.1 °F (36.7 °C) 98 %      MAP       --         Physical Exam    Nursing note and vitals reviewed.  Constitutional: She appears well-developed and well-nourished. She appears distressed.   Purposeful movement   HENT:   Head: Normocephalic and atraumatic.   Nose: Nose normal.   Mouth/Throat: Oropharynx is clear and moist.   Eyes: Conjunctivae and EOM are normal. Pupils are equal, round, and reactive to light.   Neck: Normal range of motion. Neck supple.   No midline or spinous tenderness.    Cardiovascular: Normal rate, regular rhythm, normal heart sounds and intact distal pulses.   Pulmonary/Chest: Breath sounds normal. No respiratory distress.   Musculoskeletal: She exhibits no edema.   Midline tenderness in the lumbar region. No step-offs. No midline tenderness in the thoracic region. Negative straight leg raise bilaterally.    Lymphadenopathy:     She has no cervical adenopathy.   Neurological: She is alert and oriented to person, place, and time. She has normal strength. No sensory deficit.   Skin: Skin is warm and dry. No rash noted. No erythema.   No abrasions or lacerations.    Psychiatric: She has a normal mood and affect. Her behavior is  normal. Judgment and thought content normal.         ED Course   Procedures  Labs Reviewed - No data to display       Imaging Results          X-Ray Lumbar Spine Ap And Lateral (Final result)  Result time 05/20/20 17:06:13    Final result by Edmond Marie Jr., MD (05/20/20 17:06:13)                 Impression:      1. Mild height loss of the superior endplate of L3 consistent with compression fracture.      Electronically signed by: Edmond Marie Jr., MD  Date:    05/20/2020  Time:    17:06             Narrative:    EXAMINATION:  XR LUMBAR SPINE AP AND LATERAL    CLINICAL HISTORY:  Low back pain, minor trauma;    COMPARISON:  03/03/2015    FINDINGS:  Mild height loss superior endplate of L3 consistent with compression fracture.  No retropulsion.  Disc space narrowing L5-S1.  Facet hypertrophy lower lumbar spine.  Remaining vertebral body heights and alignment are well maintained.                                 Medical Decision Making:   Clinical Tests:   Radiological Study: Ordered and Reviewed  Mild, stable L3 compression fracture. Patient will follow up with orthopedics without fail for further treatment which may include a back brace. Rx for  robaxin and norco. Return to the ED for severe pain, numbness, weakness, bowel or bladder incontinence or worse in any way.                                  Clinical Impression:       ICD-10-CM ICD-9-CM   1. Closed compression fracture of L3 lumbar vertebra, initial encounter S32.030A 805.4         Disposition:   Disposition: Discharged                        TODD Dexter  05/20/20 2597

## 2020-05-20 NOTE — DISCHARGE INSTRUCTIONS
Return to the ED for severe pain, numbness, weakness, bowel or bladder incontinence or worse in any way.

## 2020-05-20 NOTE — PROVIDER PROGRESS NOTES - EMERGENCY DEPT.
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  Pt denies hitting head, denies LOC.  Pt c/o pain to lower back.         VITAL SIGNS   Initial Vitals [05/20/20 1637]   BP Pulse Resp Temp SpO2   (!) 170/87 68 18 98.1 °F (36.7 °C) 98 %      MAP       --            ALLERGIES    Review of patient's allergies indicates:   Allergen Reactions    Ace inhibitors Swelling    Benicar [olmesartan] Swelling    Methyldopa Other (See Comments)     sleepy    Clonidine Other (See Comments)     sleepy       PROVIDER TRIAGE NOTE  Patient with past medical history hyperlipidemia, hypertension, GERD presents to the ED for evaluation of lower back pain.  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  Patient was standing on the porch and became startled and fell backwards.  She states she landed on the ground on her back.  She denies head injury or LOC.  She denies leg pain, numbness, tingling, bowel or bladder incontinence.  She states that most of the pain is over her spine in the lumbar region.  She took 2 Tylenol with minimal relief prior to arrival.      ORDERS  Labs Reviewed - No data to display    ED Orders (720h ago, onward)    Start Ordered     Status Ordering Provider    05/20/20 1643 05/20/20 1642  X-Ray Lumbar Spine Ap And Lateral  1 time imaging      Ordered SAUNDRA KOO            Virtual Visit Note: The provider triage portion of this emergency department evaluation and documentation was performed via [employer], a HIPAA-compliant telemedicine application, in concert with a tele-presenter in the room. A face to face patient evaluation with one of my colleagues will occur once the patient is placed in an emergency department room.      DISCLAIMER: This note was prepared with [employer] voice recognition transcription software.  Garbled syntax, mangled pronouns, and other bizarre constructions may be attributed to that software system.

## 2020-05-26 ENCOUNTER — PATIENT MESSAGE (OUTPATIENT)
Dept: ORTHOPEDICS | Facility: CLINIC | Age: 55
End: 2020-05-26

## 2020-05-26 ENCOUNTER — TELEPHONE (OUTPATIENT)
Dept: ORTHOPEDICS | Facility: CLINIC | Age: 55
End: 2020-05-26

## 2020-05-26 DIAGNOSIS — M51.36 DDD (DEGENERATIVE DISC DISEASE), LUMBAR: Primary | ICD-10-CM

## 2020-06-03 ENCOUNTER — PATIENT OUTREACH (OUTPATIENT)
Dept: ADMINISTRATIVE | Facility: OTHER | Age: 55
End: 2020-06-03

## 2020-06-05 ENCOUNTER — OFFICE VISIT (OUTPATIENT)
Dept: ORTHOPEDICS | Facility: CLINIC | Age: 55
End: 2020-06-05
Payer: COMMERCIAL

## 2020-06-05 ENCOUNTER — HOSPITAL ENCOUNTER (OUTPATIENT)
Dept: RADIOLOGY | Facility: HOSPITAL | Age: 55
Discharge: HOME | End: 2020-06-05
Attending: PHYSICIAN ASSISTANT
Payer: COMMERCIAL

## 2020-06-05 VITALS — BODY MASS INDEX: 28 KG/M2 | WEIGHT: 200 LBS | HEIGHT: 71 IN

## 2020-06-05 DIAGNOSIS — M51.36 DDD (DEGENERATIVE DISC DISEASE), LUMBAR: ICD-10-CM

## 2020-06-05 DIAGNOSIS — S32.030A COMPRESSION FRACTURE OF L3 LUMBAR VERTEBRA, CLOSED, INITIAL ENCOUNTER: Primary | ICD-10-CM

## 2020-06-05 PROCEDURE — 99999 PR PBB SHADOW E&M-EST. PATIENT-LVL III: ICD-10-PCS | Mod: PBBFAC,,, | Performed by: PHYSICIAN ASSISTANT

## 2020-06-05 PROCEDURE — 99999 PR PBB SHADOW E&M-EST. PATIENT-LVL III: CPT | Mod: PBBFAC,,, | Performed by: PHYSICIAN ASSISTANT

## 2020-06-05 PROCEDURE — 72120 XR LUMBAR SPINE FLEXION AND EXTENSION ONLY: ICD-10-PCS | Mod: 26,,, | Performed by: RADIOLOGY

## 2020-06-05 PROCEDURE — 99214 PR OFFICE/OUTPT VISIT, EST, LEVL IV, 30-39 MIN: ICD-10-PCS | Mod: S$GLB,,, | Performed by: PHYSICIAN ASSISTANT

## 2020-06-05 PROCEDURE — 99214 OFFICE O/P EST MOD 30 MIN: CPT | Mod: S$GLB,,, | Performed by: PHYSICIAN ASSISTANT

## 2020-06-05 PROCEDURE — 3008F PR BODY MASS INDEX (BMI) DOCUMENTED: ICD-10-PCS | Mod: CPTII,S$GLB,, | Performed by: PHYSICIAN ASSISTANT

## 2020-06-05 PROCEDURE — 72120 X-RAY BEND ONLY L-S SPINE: CPT | Mod: TC

## 2020-06-05 PROCEDURE — 3008F BODY MASS INDEX DOCD: CPT | Mod: CPTII,S$GLB,, | Performed by: PHYSICIAN ASSISTANT

## 2020-06-05 PROCEDURE — 72120 X-RAY BEND ONLY L-S SPINE: CPT | Mod: 26,,, | Performed by: RADIOLOGY

## 2020-06-05 RX ORDER — CALCITONIN SALMON 200 [IU]/.09ML
1 SPRAY, METERED NASAL DAILY
Qty: 1 BOTTLE | Refills: 0 | Status: SHIPPED | OUTPATIENT
Start: 2020-06-05 | End: 2020-09-02

## 2020-06-05 RX ORDER — CYCLOBENZAPRINE HCL 5 MG
5-10 TABLET ORAL 3 TIMES DAILY PRN
Qty: 60 TABLET | Refills: 0 | Status: SHIPPED | OUTPATIENT
Start: 2020-06-05 | End: 2020-09-02

## 2020-06-05 NOTE — PROGRESS NOTES
"DATE: 6/5/2020  PATIENT: Lori Burgess    Supervising Physician: Obdulio Mercado M.D.    CHIEF COMPLAINT: back pain    HISTORY:  Lori Burgess is a 54 y.o. female [place]  here for initial evaluation of low back pain (Back - 8). The pain has been present for about 2 weeks.  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  She immediately had back pain.  She came to the ED here and was diagnosed with an L3 compression fracture.  She was discharged with norco and robaxin and told to follow up with us.  The patient describes the pain as "someone punching me in my back."  The pain is worse with touching her back, sitting and laying down and improved by nothing. There is no associated numbness and tingling. There is no subjective weakness. Prior treatments have included robaxin and norco, but no PT, ESIs or surgery.  She denies any prior history of back pain.  She says this is a workman's comp case but it has not been approved yet.     The patient denies myelopathic symptoms such as handwriting changes or difficulty with buttons/coins/keys. Denies perineal paresthesias, bowel/bladder dysfunction.    PAST MEDICAL/SURGICAL HISTORY:  Past Medical History:   Diagnosis Date    Anemia     Diverticulosis     History of gastroesophageal reflux (GERD)     Hyperlipidemia     Hypertension     Stroke 2015    Trauma 1998    Fractured Right ankle     Past Surgical History:   Procedure Laterality Date    BARTHOLIN GLAND CYST EXCISION      Marsupialization, not excision    HYSTERECTOMY  10/2006    Laparoscopic Supracervical Hysterectomy    TUBAL LIGATION         Current Medications:   Current Outpatient Medications:     aspirin (ECOTRIN) 325 MG EC tablet, Take 1 tablet (325 mg total) by mouth once daily., Disp: , Rfl: 0    atorvastatin (LIPITOR) 20 MG tablet, Take 1 tablet (20 mg total) by mouth " once daily., Disp: 90 tablet, Rfl: 0    calcitonin, salmon, (FORTICAL) 200 unit/actuation nasal spray, 1 spray by Nasal route once daily., Disp: 1 Bottle, Rfl: 0    carvedilol (COREG) 25 MG tablet, Take 1 tablet (25 mg total) by mouth 2 (two) times daily with meals., Disp: 180 tablet, Rfl: 0    cyclobenzaprine (FLEXERIL) 5 MG tablet, Take 1-2 tablets (5-10 mg total) by mouth 3 (three) times daily as needed for Muscle spasms., Disp: 60 tablet, Rfl: 0    meloxicam (MOBIC) 15 MG tablet, Take 1 tablet (15 mg total) by mouth once daily., Disp: 30 tablet, Rfl: 0    NIFEdipine (PROCARDIA-XL) 90 MG (OSM) 24 hr tablet, Take 1 tablet (90 mg total) by mouth once daily., Disp: 90 tablet, Rfl: 0    potassium chloride (KLOR-CON) 10 MEQ TbSR, TAKE 2 TABLETS BY MOUTH TWICE DAILY, Disp: 360 tablet, Rfl: 0    triamterene-hydrochlorothiazide 37.5-25 mg (DYAZIDE) 37.5-25 mg per capsule, Take 1 capsule by mouth once daily., Disp: 90 capsule, Rfl: 0    Social History:   Social History     Socioeconomic History    Marital status: Single     Spouse name: Not on file    Number of children: Not on file    Years of education: Not on file    Highest education level: Not on file   Occupational History     Employer: [employer]   Social Needs    Financial resource strain: Not on file    Food insecurity:     Worry: Not on file     Inability: Not on file    Transportation needs:     Medical: Not on file     Non-medical: Not on file   Tobacco Use    Smoking status: Never Smoker    Smokeless tobacco: Never Used   Substance and Sexual Activity    Alcohol use: Not Currently    Drug use: No    Sexual activity: Not Currently     Birth control/protection: None, Condom, See Surgical Hx     Comment: Hysterectomy   Lifestyle    Physical activity:     Days per week: Not on file     Minutes per session: Not on file    Stress: Not on file   Relationships    Social connections:     Talks on phone: Not on file     Gets  "together: Not on file     Attends Buddhist service: Not on file     Active member of club or organization: Not on file     Attends meetings of clubs or organizations: Not on file     Relationship status: Not on file   Other Topics Concern    Not on file   Social History Narrative    Not on file       REVIEW OF SYSTEMS:  Constitution: Negative. Negative for chills, fever and night sweats.   Cardiovascular: Negative for chest pain and syncope.   Respiratory: Negative for cough and shortness of breath.   Gastrointestinal: See HPI. Negative for nausea/vomiting. Negative for abdominal pain.  Genitourinary: See HPI. Negative for discoloration or dysuria.  Skin: Negative for dry skin, itching and rash.   Hematologic/Lymphatic: Negative for bleeding problem. Does not bruise/bleed easily.   Musculoskeletal: Negative for falls and muscle weakness.   Neurological: See HPI. No seizures.   Endocrine: Negative for polydipsia, polyphagia and polyuria.   Allergic/Immunologic: Negative for hives and persistent infections.    PHYSICAL EXAMINATION:    Ht 5' 11" (1.803 m)   Wt 90.7 kg (200 lb)   LMP  (LMP Unknown)   BMI 27.89 kg/m²     General: The patient is a very pleasant 54 y.o. female in no apparent distress, the patient is oriented to person, place and time.   Psych: Normal mood and affect  HEENT: Vision grossly intact, hearing intact to the spoken word.  Lungs: Respirations unlabored.  Gait: Normal station and gait, no difficulty with toe or heel walk.   Skin: Dorsal lumbar skin negative for rashes, lesions, hairy patches and surgical scars.  Range of motion: Lumbar range of motion is acceptable. There is mild to moderate lumbar tenderness to palpation.  Spinal Balance: Global saggital and coronal spinal balance acceptable, no significant for scoliosis and kyphosis.  Musculoskeletal: No pain with the range of motion of the bilateral hips. No trochanteric tenderness to palpation.  Vascular: Bilateral lower extremities warm " and well perfused, Dorsalis pedis pulses 2+ bilaterally.  Neurological: Normal strength and tone in all major motor groups in the bilateral lower extremities. Normal sensation to light touch in the L2-S1 dermatomes bilaterally.  Deep tendon reflexes symmetric 2+ in the bilateral lower extremities.  Negative Babinski bilaterally.  Straight leg raise negative bilaterally.     IMAGING:   Today I personally reviewed AP, Lat and Flex/Ex upright L-spine films that demonstrate mild compression deformity at L3.        Body mass index is 27.89 kg/m².    Hemoglobin A1C   Date Value Ref Range Status   03/05/2018 5.9 (H) 4.0 - 5.6 % Final     Comment:     According to ADA guidelines, hemoglobin A1c <7.0% represents  optimal control in non-pregnant diabetic patients. Different  metrics may apply to specific patient populations.   Standards of Medical Care in Diabetes-2016.  For the purpose of screening for the presence of diabetes:  <5.7%     Consistent with the absence of diabetes  5.7-6.4%  Consistent with increasing risk for diabetes   (prediabetes)  >or=6.5%  Consistent with diabetes  Currently, no consensus exists for use of hemoglobin A1c  for diagnosis of diabetes for children.  This Hemoglobin A1c assay has significant interference with fetal   hemoglobin   (HbF). The results are invalid for patients with abnormal amounts of   HbF,   including those with known Hereditary Persistence   of Fetal Hemoglobin. Heterozygous hemoglobin variants (HbAS, HbAC,   HbAD, HbAE, HbA2) do not significantly interfere with this assay;   however, presence of multiple variants in a sample may impact the %   interference.     09/12/2017 6.0 (H) 4.0 - 5.6 % Final     Comment:     According to ADA guidelines, hemoglobin A1c <7.0% represents  optimal control in non-pregnant diabetic patients. Different  metrics may apply to specific patient populations.   Standards of Medical Care in Diabetes-2016.  For the purpose of screening for the presence  of diabetes:  <5.7%     Consistent with the absence of diabetes  5.7-6.4%  Consistent with increasing risk for diabetes   (prediabetes)  >or=6.5%  Consistent with diabetes  Currently, no consensus exists for use of hemoglobin A1c  for diagnosis of diabetes for children.  This Hemoglobin A1c assay has significant interference with fetal   hemoglobin   (HbF). The results are invalid for patients with abnormal amounts of   HbF,   including those with known Hereditary Persistence   of Fetal Hemoglobin. Heterozygous hemoglobin variants (HbAS, HbAC,   HbAD, HbAE, HbA2) do not significantly interfere with this assay;   however, presence of multiple variants in a sample may impact the %   interference.     04/25/2017 6.4 (H) 4.5 - 6.2 % Final     Comment:     According to ADA guidelines, hemoglobin A1C <7.0% represents  optimal control in non-pregnant diabetic patients.  Different  metrics may apply to specific populations.   Standards of Medical Care in Diabetes - 2016.  For the purpose of screening for the presence of diabetes:  <5.7%     Consistent with the absence of diabetes  5.7-6.4%  Consistent with increasing risk for diabetes   (prediabetes)  >or=6.5%  Consistent with diabetes  Currently no consensus exists for use of hemoglobin A1C  for diagnosis of diabetes for children.           ASSESSMENT/PLAN:    Lori was seen today for low-back pain.    Diagnoses and all orders for this visit:    Compression fracture of L3 lumbar vertebra, closed, initial encounter  -     X-Ray Lumbar Spine AP And Lateral; Future    Other orders  -     calcitonin, salmon, (FORTICAL) 200 unit/actuation nasal spray; 1 spray by Nasal route once daily.  -     cyclobenzaprine (FLEXERIL) 5 MG tablet; Take 1-2 tablets (5-10 mg total) by mouth 3 (three) times daily as needed for Muscle spasms.      Today we discussed at length all of the different treatment options including anti-inflammatories, acetaminophen, rest, ice, heat, physical therapy  including strengthening and stretching exercises, home exercises, ROM, aerobic conditioning, aqua therapy, other modalities including ultrasound, massage, and dry needling, epidural steroid injections, kyphoplasty and finally surgical intervention.      Prescription for calcitonin nasal spray sent to the pharmacy.  I will see her back in 4 weeks with repeat imaging.       Follow up if symptoms worsen or fail to improve.

## 2020-06-08 ENCOUNTER — OFFICE VISIT (OUTPATIENT)
Dept: PODIATRY | Facility: CLINIC | Age: 55
End: 2020-06-08
Payer: COMMERCIAL

## 2020-06-08 VITALS
HEIGHT: 71 IN | RESPIRATION RATE: 18 BRPM | HEART RATE: 68 BPM | BODY MASS INDEX: 34.26 KG/M2 | WEIGHT: 244.69 LBS | SYSTOLIC BLOOD PRESSURE: 148 MMHG | DIASTOLIC BLOOD PRESSURE: 92 MMHG

## 2020-06-08 DIAGNOSIS — B35.1 ONYCHOMYCOSIS DUE TO DERMATOPHYTE: ICD-10-CM

## 2020-06-08 DIAGNOSIS — L60.0 ONYCHOCRYPTOSIS: Primary | ICD-10-CM

## 2020-06-08 PROCEDURE — 3077F SYST BP >= 140 MM HG: CPT | Mod: CPTII,S$GLB,, | Performed by: STUDENT IN AN ORGANIZED HEALTH CARE EDUCATION/TRAINING PROGRAM

## 2020-06-08 PROCEDURE — 3080F DIAST BP >= 90 MM HG: CPT | Mod: CPTII,S$GLB,, | Performed by: STUDENT IN AN ORGANIZED HEALTH CARE EDUCATION/TRAINING PROGRAM

## 2020-06-08 PROCEDURE — 99203 PR OFFICE/OUTPT VISIT, NEW, LEVL III, 30-44 MIN: ICD-10-PCS | Mod: 25,S$GLB,, | Performed by: STUDENT IN AN ORGANIZED HEALTH CARE EDUCATION/TRAINING PROGRAM

## 2020-06-08 PROCEDURE — 99999 PR PBB SHADOW E&M-EST. PATIENT-LVL III: ICD-10-PCS | Mod: PBBFAC,,, | Performed by: STUDENT IN AN ORGANIZED HEALTH CARE EDUCATION/TRAINING PROGRAM

## 2020-06-08 PROCEDURE — 3008F BODY MASS INDEX DOCD: CPT | Mod: CPTII,S$GLB,, | Performed by: STUDENT IN AN ORGANIZED HEALTH CARE EDUCATION/TRAINING PROGRAM

## 2020-06-08 PROCEDURE — 3077F PR MOST RECENT SYSTOLIC BLOOD PRESSURE >= 140 MM HG: ICD-10-PCS | Mod: CPTII,S$GLB,, | Performed by: STUDENT IN AN ORGANIZED HEALTH CARE EDUCATION/TRAINING PROGRAM

## 2020-06-08 PROCEDURE — 3080F PR MOST RECENT DIASTOLIC BLOOD PRESSURE >= 90 MM HG: ICD-10-PCS | Mod: CPTII,S$GLB,, | Performed by: STUDENT IN AN ORGANIZED HEALTH CARE EDUCATION/TRAINING PROGRAM

## 2020-06-08 PROCEDURE — 99999 PR PBB SHADOW E&M-EST. PATIENT-LVL III: CPT | Mod: PBBFAC,,, | Performed by: STUDENT IN AN ORGANIZED HEALTH CARE EDUCATION/TRAINING PROGRAM

## 2020-06-08 PROCEDURE — 11720 ROUTINE FOOT CARE: ICD-10-PCS | Mod: S$GLB,,, | Performed by: STUDENT IN AN ORGANIZED HEALTH CARE EDUCATION/TRAINING PROGRAM

## 2020-06-08 PROCEDURE — 11720 DEBRIDE NAIL 1-5: CPT | Mod: S$GLB,,, | Performed by: STUDENT IN AN ORGANIZED HEALTH CARE EDUCATION/TRAINING PROGRAM

## 2020-06-08 PROCEDURE — 99203 OFFICE O/P NEW LOW 30 MIN: CPT | Mod: 25,S$GLB,, | Performed by: STUDENT IN AN ORGANIZED HEALTH CARE EDUCATION/TRAINING PROGRAM

## 2020-06-08 PROCEDURE — 3008F PR BODY MASS INDEX (BMI) DOCUMENTED: ICD-10-PCS | Mod: CPTII,S$GLB,, | Performed by: STUDENT IN AN ORGANIZED HEALTH CARE EDUCATION/TRAINING PROGRAM

## 2020-06-08 RX ORDER — CICLOPIROX 80 MG/ML
SOLUTION TOPICAL NIGHTLY
Qty: 1 BOTTLE | Refills: 0 | Status: SHIPPED | OUTPATIENT
Start: 2020-06-08 | End: 2020-09-02

## 2020-06-08 NOTE — PROGRESS NOTES
Subjective:      Patient ID: Lori Burgess is a 54 y.o. female.    Chief Complaint: PCP (Syeda Chang MD  5/28/20); Nail Problem; and Toe Pain    Lori is a 54 y.o. female who presents to the clinic complaining of painful ingrown toenail on the left foot. States she normally gets her nails cut at nail salon however they have been closed and she has been having increased pain to her left great toe with pressure. States has trouble cutting her toe nails because they are thick. Denies open wounds, SOI, no other pedal complaints.      Review of Systems   Constitution: Negative for chills, decreased appetite, diaphoresis and fever.   HENT: Negative for congestion and hearing loss.    Cardiovascular: Negative for chest pain, claudication, leg swelling and syncope.   Respiratory: Negative for cough and shortness of breath.    Skin: Positive for nail changes. Negative for color change, dry skin, flushing, itching, poor wound healing, rash, suspicious lesions and unusual hair distribution.   Gastrointestinal: Negative for nausea and vomiting.   Neurological: Negative for numbness and sensory change.   Psychiatric/Behavioral: Negative for altered mental status, suicidal ideas and thoughts of violence. The patient is not nervous/anxious.            Objective:      Physical Exam   Constitutional: She is oriented to person, place, and time. She appears well-developed and well-nourished. No distress.   Cardiovascular: Intact distal pulses.   Dorsalis pedis and posterior tibial pulses are within normal limits. Skin temperature is within normal limits. Toes are cool to touch and feet are warm proximally. Hair growth is within normal limits. Skin is normotrophic and without hyperpigmentation. No edema noted. No spider veins or varicosities noted.        Musculoskeletal: She exhibits no edema or tenderness.        Right foot: There is decreased range of motion and deformity.        Left foot: There is decreased range of  motion and deformity.   Adequate joint range of motion without pain, limitation, nor crepitation to bilateral feet and ankle joints.Muscle strength is 5/5 in all groups bilaterally.    No POP noted, lincoln   Feet:   Right Foot:   Skin Integrity: Positive for dry skin. Negative for blister, erythema or warmth.   Left Foot:   Skin Integrity: Positive for dry skin. Negative for blister, erythema or warmth.   Lymphadenopathy:   Negative lymphadenopathy bilateral popliteal fossa and tarsal tunnel.    Neurological: She is alert and oriented to person, place, and time. She has normal strength. No sensory deficit. She exhibits normal muscle tone.   Light touch within normal limits bilaterally. Vibratory sensation within normal limits, lincoln.     Negative tinel sign to tarsal tunnel, lincoln.        Skin: Skin is warm and dry. No abrasion, no bruising, no burn, no laceration, no petechiae and no rash noted. She is not diaphoretic. No cyanosis or erythema. No pallor. Nails show no clubbing.   Skin is warm and dry, bilaterally, no acute SOI noted, bilaterally, appears stable.       Nails 1,3,4,5, lincoln, are thickened by 2-4 mm's, dystrophic, and are darkened in coloration with subungual fungal debris.     Cryptotic nail borders noted to medial and lateral aspect of lincoln hallux toe nail, no associated open wounds or acute SOI noted.        Psychiatric: She has a normal mood and affect. Her behavior is normal. Judgment and thought content normal.             Assessment:       Encounter Diagnoses   Name Primary?    Onychomycosis due to dermatophyte Yes    Onychocryptosis          Plan:       Lori was seen today for pcp, nail problem and toe pain.    Diagnoses and all orders for this visit:    Onychomycosis due to dermatophyte    Onychocryptosis    Other orders  -     ciclopirox (PENLAC) 8 % Soln; Apply topically nightly.      I counseled the patient on her conditions, their implications and medical management.    Discussed oral vs  topical tx for nail fungus, pt elects for topical tx for now, script dispensed.     Discussed toe nail avulsion vs debridement of ingrown toe nails for onychocryptosis. Pt states she wants to avoid toe nail avulsion for now. Nail debridement with slant back procedure to lincoln hallux toe nails performed to medial and lateral border without incident, see procedure note, pt tolerated procedure well.     Recommend supportive tennis shoes for foot pain. Recommend shoes with small heel, rigid arch support and wide toe box to accommodate foot type. Stretch calf muscles as demonstrated in clinic to help alleviate pressure to foot.     Shoe inspection. General Foot Education. Patient instructed on proper foot hygeine. We discussed wearing proper shoe gear, daily foot inspections, never walking without protective shoe gear, never putting sharp instruments to feet. Patient reminded of the importance of good nutrition, weight loss if needed to help alleviate foot pressure/pain     Discussed general foot care:  Wear comfortable, proper fitting shoes. Wash feet daily. Dry well. After drying, apply moisturizer to feet (no lotion to webspaces). Inspect feet daily for skin breaks, blisters, swelling, or redness. Wear cotton socks (preferably white)  Change socks every day. Do NOT walk barefoot. Do NOT use heating pads or warm/hot water soaks. I discussed with the  patient  signs and symptoms of infection including redness, drainage, purulence, odor, pain, elevated BS, streaking, fever, chills, etc . Patient is to seek medical attention (ER or urgent care) if these symptoms occur      RTC in 2 weeks for consideration of left great toe nail removal if symptoms continue to persist.

## 2020-06-08 NOTE — PROCEDURES
Routine Foot Care  Date/Time: 6/8/2020 8:00 AM  Performed by: Fatou Bustamante DPM  Authorized by: Fatou Bustamante DPM     Consent Done?:  Yes (Verbal)    Nail Care Type:  Debride(Left 1st Toe and Right 2nd Toe)  Patient tolerance:  Patient tolerated the procedure well with no immediate complications     Debridement of cryptotic nail borders performed without incident, using sterile nail nippers, pt tolerated procedure well.

## 2020-06-24 ENCOUNTER — PATIENT OUTREACH (OUTPATIENT)
Dept: ADMINISTRATIVE | Facility: OTHER | Age: 55
End: 2020-06-24

## 2020-06-29 ENCOUNTER — OFFICE VISIT (OUTPATIENT)
Dept: PODIATRY | Facility: CLINIC | Age: 55
End: 2020-06-29
Payer: COMMERCIAL

## 2020-06-29 VITALS
BODY MASS INDEX: 34.26 KG/M2 | HEART RATE: 59 BPM | SYSTOLIC BLOOD PRESSURE: 146 MMHG | DIASTOLIC BLOOD PRESSURE: 90 MMHG | WEIGHT: 244.69 LBS | HEIGHT: 71 IN

## 2020-06-29 DIAGNOSIS — B35.1 ONYCHOMYCOSIS DUE TO DERMATOPHYTE: Primary | ICD-10-CM

## 2020-06-29 DIAGNOSIS — L60.0 ONYCHOCRYPTOSIS: ICD-10-CM

## 2020-06-29 PROCEDURE — 99214 OFFICE O/P EST MOD 30 MIN: CPT | Mod: S$GLB,,, | Performed by: STUDENT IN AN ORGANIZED HEALTH CARE EDUCATION/TRAINING PROGRAM

## 2020-06-29 PROCEDURE — 3080F PR MOST RECENT DIASTOLIC BLOOD PRESSURE >= 90 MM HG: ICD-10-PCS | Mod: CPTII,S$GLB,, | Performed by: STUDENT IN AN ORGANIZED HEALTH CARE EDUCATION/TRAINING PROGRAM

## 2020-06-29 PROCEDURE — 3008F BODY MASS INDEX DOCD: CPT | Mod: CPTII,S$GLB,, | Performed by: STUDENT IN AN ORGANIZED HEALTH CARE EDUCATION/TRAINING PROGRAM

## 2020-06-29 PROCEDURE — 3077F SYST BP >= 140 MM HG: CPT | Mod: CPTII,S$GLB,, | Performed by: STUDENT IN AN ORGANIZED HEALTH CARE EDUCATION/TRAINING PROGRAM

## 2020-06-29 PROCEDURE — 99214 PR OFFICE/OUTPT VISIT, EST, LEVL IV, 30-39 MIN: ICD-10-PCS | Mod: S$GLB,,, | Performed by: STUDENT IN AN ORGANIZED HEALTH CARE EDUCATION/TRAINING PROGRAM

## 2020-06-29 PROCEDURE — 3077F PR MOST RECENT SYSTOLIC BLOOD PRESSURE >= 140 MM HG: ICD-10-PCS | Mod: CPTII,S$GLB,, | Performed by: STUDENT IN AN ORGANIZED HEALTH CARE EDUCATION/TRAINING PROGRAM

## 2020-06-29 PROCEDURE — 3080F DIAST BP >= 90 MM HG: CPT | Mod: CPTII,S$GLB,, | Performed by: STUDENT IN AN ORGANIZED HEALTH CARE EDUCATION/TRAINING PROGRAM

## 2020-06-29 PROCEDURE — 99999 PR PBB SHADOW E&M-EST. PATIENT-LVL III: ICD-10-PCS | Mod: PBBFAC,,, | Performed by: STUDENT IN AN ORGANIZED HEALTH CARE EDUCATION/TRAINING PROGRAM

## 2020-06-29 PROCEDURE — 99999 PR PBB SHADOW E&M-EST. PATIENT-LVL III: CPT | Mod: PBBFAC,,, | Performed by: STUDENT IN AN ORGANIZED HEALTH CARE EDUCATION/TRAINING PROGRAM

## 2020-06-29 PROCEDURE — 3008F PR BODY MASS INDEX (BMI) DOCUMENTED: ICD-10-PCS | Mod: CPTII,S$GLB,, | Performed by: STUDENT IN AN ORGANIZED HEALTH CARE EDUCATION/TRAINING PROGRAM

## 2020-06-29 NOTE — PROGRESS NOTES
Subjective:      Patient ID: Lori Burgess is a 54 y.o. female.    Chief Complaint: Follow-up (PT HAS NO PAIN COMPLAINT )    Lori is a 54 y.o. female who presents to the clinic complaining of painful ingrown toenail on the left foot. States she normally gets her nails cut at nail salon however they have been closed and she has been having increased pain to her left great toe with pressure. States has trouble cutting her toe nails because they are thick. Denies open wounds, SOI, no other pedal complaints.      6/29/20: Pt presents for follow up s/p slant back procedure, denies pain. No other pedal complaints.     Review of Systems   Constitution: Negative for chills, decreased appetite, diaphoresis and fever.   HENT: Negative for congestion and hearing loss.    Cardiovascular: Negative for chest pain, claudication, leg swelling and syncope.   Respiratory: Negative for cough and shortness of breath.    Skin: Positive for nail changes. Negative for color change, dry skin, flushing, itching, poor wound healing, rash, suspicious lesions and unusual hair distribution.   Gastrointestinal: Negative for nausea and vomiting.   Neurological: Negative for numbness and sensory change.   Psychiatric/Behavioral: Negative for altered mental status, suicidal ideas and thoughts of violence. The patient is not nervous/anxious.            Objective:      Physical Exam  Constitutional:       General: She is not in acute distress.     Appearance: She is well-developed. She is not diaphoretic.   Cardiovascular:      Comments: Dorsalis pedis and posterior tibial pulses are within normal limits. Skin temperature is within normal limits. Toes are cool to touch and feet are warm proximally. Hair growth is within normal limits. Skin is normotrophic and without hyperpigmentation. No edema noted. No spider veins or varicosities noted.       Musculoskeletal:         General: No tenderness.      Right foot: Decreased range of motion.  Deformity present.      Left foot: Decreased range of motion. Deformity present.      Comments: Adequate joint range of motion without pain, limitation, nor crepitation to bilateral feet and ankle joints.Muscle strength is 5/5 in all groups bilaterally.    No POP noted, lincoln   Feet:      Right foot:      Skin integrity: Dry skin present. No blister, erythema or warmth.      Left foot:      Skin integrity: Dry skin present. No blister, erythema or warmth.   Lymphadenopathy:      Comments: Negative lymphadenopathy bilateral popliteal fossa and tarsal tunnel.    Skin:     General: Skin is warm and dry.      Coloration: Skin is not pale.      Findings: No abrasion, bruising, burn, erythema, laceration, petechiae or rash.      Nails: There is no clubbing.        Comments: Skin is warm and dry, bilaterally, no acute SOI noted, bilaterally, appears stable.       Nails 1,3,4,5, lincoln, are thickened by 2-4 mm's, dystrophic, and are darkened in coloration with subungual fungal debris.     No POP or further cryptotic nail border noted.        Neurological:      Mental Status: She is alert and oriented to person, place, and time.      Sensory: No sensory deficit.      Motor: No abnormal muscle tone.      Comments: Light touch within normal limits bilaterally. Vibratory sensation within normal limits, lincoln.     Negative tinel sign to tarsal tunnel, lincoln.        Psychiatric:         Behavior: Behavior normal.         Thought Content: Thought content normal.         Judgment: Judgment normal.               Assessment:       No diagnosis found.      Plan:       There are no diagnoses linked to this encounter.  I counseled the patient on her conditions, their implications and medical management.  ]  No further cryptotic nail border noted, pt to RTC if symptoms return    Continue topical antifungal    Recommend supportive tennis shoes for foot pain. Recommend shoes with small heel, rigid arch support and wide toe box to accommodate foot  type. Stretch calf muscles as demonstrated in clinic to help alleviate pressure to foot.     Shoe inspection. General Foot Education. Patient instructed on proper foot hygeine. We discussed wearing proper shoe gear, daily foot inspections, never walking without protective shoe gear, never putting sharp instruments to feet. Patient reminded of the importance of good nutrition, weight loss if needed to help alleviate foot pressure/pain     Discussed general foot care:  Wear comfortable, proper fitting shoes. Wash feet daily. Dry well. After drying, apply moisturizer to feet (no lotion to webspaces). Inspect feet daily for skin breaks, blisters, swelling, or redness. Wear cotton socks (preferably white)  Change socks every day. Do NOT walk barefoot. Do NOT use heating pads or warm/hot water soaks. I discussed with the  patient  signs and symptoms of infection including redness, drainage, purulence, odor, pain, elevated BS, streaking, fever, chills, etc . Patient is to seek medical attention (ER or urgent care) if these symptoms occur      RTC PRN

## 2020-06-30 ENCOUNTER — OFFICE VISIT (OUTPATIENT)
Dept: ORTHOPEDICS | Facility: CLINIC | Age: 55
End: 2020-06-30
Payer: COMMERCIAL

## 2020-06-30 ENCOUNTER — HOSPITAL ENCOUNTER (OUTPATIENT)
Dept: RADIOLOGY | Facility: HOSPITAL | Age: 55
Discharge: HOME | End: 2020-06-30
Attending: PHYSICIAN ASSISTANT
Payer: COMMERCIAL

## 2020-06-30 VITALS — BODY MASS INDEX: 34.16 KG/M2 | WEIGHT: 244 LBS | HEIGHT: 71 IN

## 2020-06-30 DIAGNOSIS — S32.030A COMPRESSION FRACTURE OF L3 LUMBAR VERTEBRA, CLOSED, INITIAL ENCOUNTER: Primary | ICD-10-CM

## 2020-06-30 DIAGNOSIS — S32.030A COMPRESSION FRACTURE OF L3 LUMBAR VERTEBRA, CLOSED, INITIAL ENCOUNTER: ICD-10-CM

## 2020-06-30 PROCEDURE — 99213 OFFICE O/P EST LOW 20 MIN: CPT | Mod: S$GLB,,, | Performed by: PHYSICIAN ASSISTANT

## 2020-06-30 PROCEDURE — 99999 PR PBB SHADOW E&M-EST. PATIENT-LVL III: CPT | Mod: PBBFAC,,, | Performed by: PHYSICIAN ASSISTANT

## 2020-06-30 PROCEDURE — 99213 PR OFFICE/OUTPT VISIT, EST, LEVL III, 20-29 MIN: ICD-10-PCS | Mod: S$GLB,,, | Performed by: PHYSICIAN ASSISTANT

## 2020-06-30 PROCEDURE — 72100 X-RAY EXAM L-S SPINE 2/3 VWS: CPT | Mod: TC

## 2020-06-30 PROCEDURE — 3008F BODY MASS INDEX DOCD: CPT | Mod: CPTII,S$GLB,, | Performed by: PHYSICIAN ASSISTANT

## 2020-06-30 PROCEDURE — 72100 X-RAY EXAM L-S SPINE 2/3 VWS: CPT | Mod: 26,,, | Performed by: RADIOLOGY

## 2020-06-30 PROCEDURE — 99999 PR PBB SHADOW E&M-EST. PATIENT-LVL III: ICD-10-PCS | Mod: PBBFAC,,, | Performed by: PHYSICIAN ASSISTANT

## 2020-06-30 PROCEDURE — 72100 XR LUMBAR SPINE AP AND LATERAL: ICD-10-PCS | Mod: 26,,, | Performed by: RADIOLOGY

## 2020-06-30 PROCEDURE — 3008F PR BODY MASS INDEX (BMI) DOCUMENTED: ICD-10-PCS | Mod: CPTII,S$GLB,, | Performed by: PHYSICIAN ASSISTANT

## 2020-06-30 NOTE — LETTER
June 30, 2020                     [facility]  [address]  [place] [address]  Phone: [phone]   Patient: Lori Burgess   MR Number: [id number]   YOB: 1965   Date of Visit: 6/30/2020     To whom it may concern,    Lori Burgess was seen in the orthopedic clinic 6/30/2020.  She is able to return to work without restrictions on 7/6/2020.  If you have any questions or concerns, please contact the office.        Sincerely,          Elise Reaves PA-C

## 2020-06-30 NOTE — PROGRESS NOTES
DATE: 6/30/2020  PATIENT: Lori Burgess    Attending Physician: Obdulio Mercado M.D.    Date of Injury: 5/20/2020    Diagnosis:   1. Compression fracture of L3 lumbar vertebra, closed, initial encounter        History: Lori Burgess is seen today for follow-up following the above listed injury. I saw her last on 6/5/2020 after the initial injury.  Overall the patient is doing well but today notes her pain is significantly improved.  She is not taking anything for pain.  She is ready to return to work.      Exam: Pleasant, NAD. Neuro exam is stable. No signs of DVT.    Radiographs: imaging today shows mild compression of L3, stable compared to previous imaging.     Assessment/Plan:   Doing well post injury. She may return to work on Monday.  Progress activities as tolerated.  Follow up as needed.       Thank you for the opportunity to participate in this patient's care. Please give me a call if there are any concerns or questions.

## 2020-08-04 DIAGNOSIS — I10 ESSENTIAL HYPERTENSION: ICD-10-CM

## 2020-08-04 RX ORDER — TRIAMTERENE AND HYDROCHLOROTHIAZIDE 37.5; 25 MG/1; MG/1
1 CAPSULE ORAL DAILY
Qty: 30 CAPSULE | Refills: 0 | Status: SHIPPED | OUTPATIENT
Start: 2020-08-04 | End: 2020-10-06 | Stop reason: SDUPTHER

## 2020-08-25 ENCOUNTER — OFFICE VISIT (OUTPATIENT)
Dept: URGENT CARE | Facility: CLINIC | Age: 55
End: 2020-08-25
Payer: COMMERCIAL

## 2020-08-25 VITALS
TEMPERATURE: 99 F | WEIGHT: 244 LBS | DIASTOLIC BLOOD PRESSURE: 97 MMHG | OXYGEN SATURATION: 99 % | HEIGHT: 71 IN | BODY MASS INDEX: 34.16 KG/M2 | HEART RATE: 67 BPM | SYSTOLIC BLOOD PRESSURE: 169 MMHG

## 2020-08-25 DIAGNOSIS — L30.9 DERMATITIS: Primary | ICD-10-CM

## 2020-08-25 PROCEDURE — 99213 OFFICE O/P EST LOW 20 MIN: CPT | Mod: S$GLB,,, | Performed by: PHYSICIAN ASSISTANT

## 2020-08-25 PROCEDURE — 99213 PR OFFICE/OUTPT VISIT, EST, LEVL III, 20-29 MIN: ICD-10-PCS | Mod: S$GLB,,, | Performed by: PHYSICIAN ASSISTANT

## 2020-08-25 RX ORDER — TRIAMCINOLONE ACETONIDE 1 MG/G
CREAM TOPICAL 2 TIMES DAILY
Qty: 45 G | Refills: 0 | Status: SHIPPED | OUTPATIENT
Start: 2020-08-25 | End: 2020-08-26

## 2020-08-25 NOTE — PROGRESS NOTES
"Subjective:       Patient ID: Lori Burgess is a 54 y.o. female.    Vitals:  height is 5' 11" (1.803 m) and weight is 110.7 kg (244 lb). Her temperature is 98.8 °F (37.1 °C). Her blood pressure is 169/97 (abnormal) and her pulse is 67. Her oxygen saturation is 99%.     Chief Complaint: Rash    Ms. Burgess presents for evaluation of rash to BLE.  It started on her bilateral inner thighs two days ago, but now it is on her lower legs as well.  She denies any itching, only describes a burning with it.  She denies any new detergents, lotions, pet or plant exposures, new foods or clothing.  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  She denies any other symptoms of fevers, chills, drainage from rash, joint pain, fatigue.     Rash  This is a new problem. The current episode started in the past 7 days (2 Days Ago ). The problem has been gradually worsening since onset. The affected locations include the left upper leg, left lower leg, right upper leg and right lower leg. The rash is characterized by redness and burning. It is unknown if there was an exposure to a precipitant. Pertinent negatives include no cough, fatigue, fever, shortness of breath or sore throat. Treatments tried: Bendaryl and Alcohol  The treatment provided no relief.       Constitution: Negative for chills, sweating, fatigue and fever.   HENT: Negative for facial swelling and sore throat.    Neck: Negative for painful lymph nodes.   Cardiovascular: Negative for chest trauma, leg swelling, palpitations, sob on exertion and passing out.   Eyes: Negative for eye itching and eyelid swelling.   Respiratory: Negative for cough and shortness of breath.    Genitourinary: Negative for dysuria, frequency and urgency.   Musculoskeletal: Negative for joint pain and joint swelling.   Skin: Positive for rash and erythema. Negative for color change, pale, wound, abrasion, laceration, lesion, skin " thickening/induration, puncture wound, bruising, abscess, avulsion and hives.   Allergic/Immunologic: Negative for environmental allergies, immunocompromised state and hives.   Neurological: Negative for dizziness, history of vertigo, light-headedness, passing out, facial drooping, speech difficulty, coordination disturbances, loss of balance and headaches.   Hematologic/Lymphatic: Negative for swollen lymph nodes.       Objective:      Physical Exam   Constitutional: She is oriented to person, place, and time. She appears well-developed.   HENT:   Head: Normocephalic and atraumatic. Head is without abrasion, without contusion and without laceration.   Ears:   Right Ear: External ear normal.   Left Ear: External ear normal.   Nose: Nose normal.   Mouth/Throat: Oropharynx is clear and moist and mucous membranes are normal.   Eyes: Pupils are equal, round, and reactive to light. Conjunctivae, EOM and lids are normal.   Neck: Trachea normal, full passive range of motion without pain and phonation normal. Neck supple.   Cardiovascular: Normal rate, regular rhythm and normal heart sounds.   Pulmonary/Chest: Effort normal and breath sounds normal. No stridor. No respiratory distress.   Musculoskeletal: Normal range of motion.   Neurological: She is alert and oriented to person, place, and time.   Skin: Skin is warm, dry, intact, rash, not urticarial, not nodular and macular. Capillary refill takes less than 2 seconds. Lesions:  erythemaabrasion, burn, bruising and ecchymosis     Psychiatric: Her speech is normal and behavior is normal. Judgment and thought content normal.   Nursing note and vitals reviewed.            Assessment:       1. Dermatitis        Plan:         Dermatitis  -     Ambulatory referral/consult to Dermatology    Other orders  -     triamcinolone acetonide 0.1% (KENALOG) 0.1 % cream; Apply topically 2 (two) times daily. for 10 days  Dispense: 45 g; Refill: 0    Possible contact dermatitis, but lesions  are not itchy and mostly macular.  Will send to dermatology.     Diagnoses and plan discussed with the patient, as well as the expected course and duration of her symptoms. All questions and concerns were addressed prior to discharge.  She was advised to follow up with dermatology, referral given. Emergency department precautions were given. Patient verbalized understanding and was happy with the plan of care.     Patient Instructions   PLEASE READ YOUR DISCHARGE INSTRUCTIONS ENTIRELY AS IT CONTAINS IMPORTANT INFORMATION.  A referral to dermatology was placed for you.  You should receive a phone call in the next few days to schedule an appt.  Please call [phone] to schedule an appt if have not gotten a phone call in the next few days.    - Rest.    - Drink plenty of fluids.    - Tylenol or Ibuprofen as directed as needed for fever/pain.    - If you were prescribed antibiotics, please take them to completion.  - If you are female and on birth control pills - please use additional methods of contraception to prevent pregnancy while on antibiotics and for one cycle after.   - If you were prescribed a narcotic medication or muscle relaxer, do not drive or operate heavy equipment or machinery while taking these medications, as they can cause drowsiness.   - If you smoke, please stop smoking.  -You must understand that you've received an Urgent Care treatment only and that you may be released before all your medical problems are known or treated. You, the patient, will    arrange for follow up care as instructed. Please arrange follow up with your primary medical clinic as soon as possible.   - Follow up with your PCP or specialty clinic as directed in the next 1-2 weeks if not improved or as needed.  You can call [phone] to schedule an appointment with the appropriate provider.    - Please return to Urgent Care or to the Emergency Department if your symptoms worsen.    Patient aware and verbalized  understanding.    Nonspecific Dermatitis  Dermatitis is a skin rash caused by something that touches the skin and makes it irritated and inflamed.  Your skin may be red, swollen, dry, and may be cracked. Blisters may form and ooze. The rash will itch.  Dermatitis can form on the face and neck, backs of hands, forearms, genitals, and lower legs. Dermatitis is not passed from person to person.  Talk with your health care provider about what may have caused the rash. Common things that cause skin allergies are metal in jewelry, plants like poison ivy or poison oak, and certain skin care products. You will need to avoid the source of your rash in the future to prevent it from coming back. In some cases, the cause of the dermatitis may not be found.  Treatment is done to relieve itching and prevent the rash from coming back. The rash should go away in a few days to a few weeks.  Home care  The health care provider may prescribe medications to relieve swelling and itching. Follow all instructions when using these medications.  · Avoid anything that heats up your skin, such as hot showers or baths, or direct sunlight. This can make itching worse.  · Stay away from whatever you think caused the rash.  · Bathe in warm, not hot, water. Apply a moisturizing lotion after bathing to prevent dry skin.  · Avoid skin irritants such as wool or silk clothing, grease, oils, harsh soaps, and detergents.  · Apply cold compresses to soothe your sores to help relieve your symptoms. Do this for 30 minutes 3 to 4 times a day. You can make a cold compress by soaking a cloth in cold water. Squeeze out excess water. You can add colloidal oatmeal to the water to help reduce itching. For severe itching in a small area, apply an ice pack wrapped in a thin towel. Do this for 20 minutes 3 to 4 times a day.  · You can also help relieve large areas of itching by taking a lukewarm bath with colloidal oatmeal added to the water.  · Use hydrocortisone  cream for redness and irritation, unless another medicine was prescribed. You can also use benzocaine anesthetic cream or spray.  · Use oral diphenhydramine to help reduce itching. This is an antihistamine you can buy at drug and grocery stores. It can make you sleepy, so use lower doses during the daytime. Or you can use loratadine. This is an antihistamine that will not make you sleepy. Dont use diphenhydramine if you have glaucoma or have trouble urinating because of an enlarged prostate.  · Wash your hands or use an antibacterial gel often to prevent the spread of the rash.  Follow-up care  Follow up with your health care provider. Make an appointment with your health care provider if your symptoms do not get better in the next 1 to 2 weeks.  When to seek medical advice  Call your health care provider right away if any of these occur:  · Spreading of the rash to other parts of your body  · Severe swelling of your face, eyelids, mouth, throat or tongue  · Trouble urinating due to swelling in the genital area  · Fever of 100.4°F (38°C) or higher  · Redness or swelling that gets worse  · Pain that gets worse  · Foul-smelling fluid leaking from the skin  · Yellow-brown crusts on the open blisters  · Joint pain   Date Last Reviewed: 7/23/2014  © 7966-6006 [employer]. [address] [place] [address], [place] [address]. All rights reserved. This information is not intended as a substitute for professional medical care. Always follow your healthcare professional's instructions.

## 2020-08-25 NOTE — PATIENT INSTRUCTIONS
PLEASE READ YOUR DISCHARGE INSTRUCTIONS ENTIRELY AS IT CONTAINS IMPORTANT INFORMATION.  A referral to dermatology was placed for you.  You should receive a phone call in the next few days to schedule an appt.  Please call [phone] to schedule an appt if have not gotten a phone call in the next few days.    - Rest.    - Drink plenty of fluids.    - Tylenol or Ibuprofen as directed as needed for fever/pain.    - If you were prescribed antibiotics, please take them to completion.  - If you are female and on birth control pills - please use additional methods of contraception to prevent pregnancy while on antibiotics and for one cycle after.   - If you were prescribed a narcotic medication or muscle relaxer, do not drive or operate heavy equipment or machinery while taking these medications, as they can cause drowsiness.   - If you smoke, please stop smoking.  -You must understand that you've received an Urgent Care treatment only and that you may be released before all your medical problems are known or treated. You, the patient, will    arrange for follow up care as instructed. Please arrange follow up with your primary medical clinic as soon as possible.   - Follow up with your PCP or specialty clinic as directed in the next 1-2 weeks if not improved or as needed.  You can call [phone] to schedule an appointment with the appropriate provider.    - Please return to Urgent Care or to the Emergency Department if your symptoms worsen.    Patient aware and verbalized understanding.    Nonspecific Dermatitis  Dermatitis is a skin rash caused by something that touches the skin and makes it irritated and inflamed.  Your skin may be red, swollen, dry, and may be cracked. Blisters may form and ooze. The rash will itch.  Dermatitis can form on the face and neck, backs of hands, forearms, genitals, and lower legs. Dermatitis is not passed from person to person.  Talk with your health care provider about what may  have caused the rash. Common things that cause skin allergies are metal in jewelry, plants like poison ivy or poison oak, and certain skin care products. You will need to avoid the source of your rash in the future to prevent it from coming back. In some cases, the cause of the dermatitis may not be found.  Treatment is done to relieve itching and prevent the rash from coming back. The rash should go away in a few days to a few weeks.  Home care  The health care provider may prescribe medications to relieve swelling and itching. Follow all instructions when using these medications.  · Avoid anything that heats up your skin, such as hot showers or baths, or direct sunlight. This can make itching worse.  · Stay away from whatever you think caused the rash.  · Bathe in warm, not hot, water. Apply a moisturizing lotion after bathing to prevent dry skin.  · Avoid skin irritants such as wool or silk clothing, grease, oils, harsh soaps, and detergents.  · Apply cold compresses to soothe your sores to help relieve your symptoms. Do this for 30 minutes 3 to 4 times a day. You can make a cold compress by soaking a cloth in cold water. Squeeze out excess water. You can add colloidal oatmeal to the water to help reduce itching. For severe itching in a small area, apply an ice pack wrapped in a thin towel. Do this for 20 minutes 3 to 4 times a day.  · You can also help relieve large areas of itching by taking a lukewarm bath with colloidal oatmeal added to the water.  · Use hydrocortisone cream for redness and irritation, unless another medicine was prescribed. You can also use benzocaine anesthetic cream or spray.  · Use oral diphenhydramine to help reduce itching. This is an antihistamine you can buy at drug and grocery stores. It can make you sleepy, so use lower doses during the daytime. Or you can use loratadine. This is an antihistamine that will not make you sleepy. Dont use diphenhydramine if you have glaucoma or have  trouble urinating because of an enlarged prostate.  · Wash your hands or use an antibacterial gel often to prevent the spread of the rash.  Follow-up care  Follow up with your health care provider. Make an appointment with your health care provider if your symptoms do not get better in the next 1 to 2 weeks.  When to seek medical advice  Call your health care provider right away if any of these occur:  · Spreading of the rash to other parts of your body  · Severe swelling of your face, eyelids, mouth, throat or tongue  · Trouble urinating due to swelling in the genital area  · Fever of 100.4°F (38°C) or higher  · Redness or swelling that gets worse  · Pain that gets worse  · Foul-smelling fluid leaking from the skin  · Yellow-brown crusts on the open blisters  · Joint pain   Date Last Reviewed: 7/23/2014  © 6300-6630 [employer]. [address] [place] [address], [place] [address]. All rights reserved. This information is not intended as a substitute for professional medical care. Always follow your healthcare professional's instructions.

## 2020-08-26 ENCOUNTER — PATIENT OUTREACH (OUTPATIENT)
Dept: ADMINISTRATIVE | Facility: OTHER | Age: 55
End: 2020-08-26

## 2020-08-26 ENCOUNTER — TELEPHONE (OUTPATIENT)
Dept: DERMATOLOGY | Facility: CLINIC | Age: 55
End: 2020-08-26

## 2020-08-26 ENCOUNTER — OFFICE VISIT (OUTPATIENT)
Dept: DERMATOLOGY | Facility: CLINIC | Age: 55
End: 2020-08-26
Payer: COMMERCIAL

## 2020-08-26 DIAGNOSIS — L98.9 DISEASE OF SKIN AND SUBCUTANEOUS TISSUE: Primary | ICD-10-CM

## 2020-08-26 DIAGNOSIS — Z12.31 ENCOUNTER FOR SCREENING MAMMOGRAM FOR MALIGNANT NEOPLASM OF BREAST: Primary | ICD-10-CM

## 2020-08-26 DIAGNOSIS — L29.9 PRURITUS: ICD-10-CM

## 2020-08-26 PROCEDURE — 11104 PR PUNCH BIOPSY, SKIN, SINGLE LESION: ICD-10-PCS | Mod: S$GLB,,, | Performed by: DERMATOLOGY

## 2020-08-26 PROCEDURE — 88305 TISSUE EXAM BY PATHOLOGIST: CPT | Mod: 26,,, | Performed by: DERMATOLOGY

## 2020-08-26 PROCEDURE — 11104 PUNCH BX SKIN SINGLE LESION: CPT | Mod: S$GLB,,, | Performed by: DERMATOLOGY

## 2020-08-26 PROCEDURE — 88305 TISSUE EXAM BY PATHOLOGIST: ICD-10-PCS | Mod: 26,,, | Performed by: DERMATOLOGY

## 2020-08-26 PROCEDURE — 99203 PR OFFICE/OUTPT VISIT, NEW, LEVL III, 30-44 MIN: ICD-10-PCS | Mod: 25,S$GLB,, | Performed by: DERMATOLOGY

## 2020-08-26 PROCEDURE — 99999 PR PBB SHADOW E&M-EST. PATIENT-LVL III: CPT | Mod: PBBFAC,,, | Performed by: DERMATOLOGY

## 2020-08-26 PROCEDURE — 99203 OFFICE O/P NEW LOW 30 MIN: CPT | Mod: 25,S$GLB,, | Performed by: DERMATOLOGY

## 2020-08-26 PROCEDURE — 99999 PR PBB SHADOW E&M-EST. PATIENT-LVL III: ICD-10-PCS | Mod: PBBFAC,,, | Performed by: DERMATOLOGY

## 2020-08-26 PROCEDURE — 88305 TISSUE EXAM BY PATHOLOGIST: CPT | Performed by: DERMATOLOGY

## 2020-08-26 RX ORDER — TRIAMCINOLONE ACETONIDE 1 MG/G
CREAM TOPICAL
Qty: 454 G | Refills: 1 | Status: SHIPPED | OUTPATIENT
Start: 2020-08-26 | End: 2021-04-05

## 2020-08-26 NOTE — LETTER
August 26, 2020      Rita Osei PA-C  [address] [facility] [address]           [facility] - Dermatology [address]  [address]  [place] [address]  Phone: [phone]  Fax: [phone]          Patient: Lori Burgess   MR Number: [id number]   YOB: 1965   Date of Visit: 8/26/2020       Dear iRta Osei:    Thank you for referring Lori Burgess to me for evaluation. Attached you will find relevant portions of my assessment and plan of care.    If you have questions, please do not hesitate to call me. I look forward to following Lori Burgess along with you.    Sincerely,    Etelvina Doty MD    Enclosure  CC:  No Recipients    If you would like to receive this communication electronically, please contact [email or web address] or [phone] to request more information on [employer] Link access.    For providers and/or their staff who would like to refer a patient to [facility], please contact us through our one-stop-shop provider referral line, [facility], at [phone].    If you feel you have received this communication in error or would no longer like to receive these types of communications, please e-mail [email or web address]

## 2020-08-26 NOTE — PATIENT INSTRUCTIONS
"Punch Biopsy Wound Care    Your doctor has performed a punch biopsy today.  A band aid and antibiotic ointment has been placed over the site.  This should remain in place for 24 hours.  It is recommended that you keep the area dry for the first 24 hours.  After 24 hours, you may remove the band aid and wash the area with warm soap and water and apply Vaseline jelly.  Many patients prefer to use Neosporin or Bacitracin ointment.  This is acceptable; however know that you can develop an allergy to this medication even if you have used it safely for years.  It is important to keep the area moist.  Letting it dry out and get air slows healing time, will worsen the scar, and make it more difficult to remove the stitches if they were placed.  Band aid is optional after first 24 hours.      If you notice increasing redness, tenderness, pain, or yellow drainage at the biopsy or surgical site, please notify your doctor.  These are signs of an infection.    If your biopsy/surgical site is bleeding, apply firm pressure for 15 minutes straight.  Repeat for another 15 minutes, if it is still bleeding.   If the surgical site continues to bleed, then please contact your doctor.      For [facility] users:   You will receive a [facility] notification after the pathologist has finished reviewing your biopsy specimen. Pathology results, however, will not be released online so you will see a "no content" message. Once your dermatologist reviews and clinically correlates your biopsy results, you will either receive a letter in the mail with the results of a phone call from your doctor's office if further explanation or treatment is warranted.       [address] [place] [address]/ [phone] FAX/ [email or web address]         "

## 2020-08-26 NOTE — PROGRESS NOTES
Patient's chart was reviewed for overdue KARRIE topics.  Orders placed:Mammogram  Tasked appts:n/a  Labs Linked:n/a

## 2020-08-26 NOTE — TELEPHONE ENCOUNTER
I called and left a message for pt informing her that she would need to schedule with the  allergy department for her hives.----- Message from Yudith Fernandez sent at 8/26/2020 12:05 PM CDT -----  Regarding: Same Day Appt Access  Contact: PT  PT called in to schedule an appointment from referral.   I saw an appointment with this office available for tomorrow, and then I went back in it and it was gone. She is wanting to make an appointment as soon as possible.  Reason: Hives and welts on both legs, more than 24 hours.   CBN# [phone]

## 2020-08-26 NOTE — PROGRESS NOTES
Subjective:       Patient ID:  Lori Burgess is a 54 y.o. female who presents for   Chief Complaint   Patient presents with    Rash     HPI   55 yo female presents today for evaluation of red rash on BLE. Rash started on Friday on her bilateral lower legs, but now it is on her upper legs as well (as of this morning). Also has noticed some small spots on arms. She denies any itching; only describes a burning with it. She was given 0.1% TAC to treat yesterday, but states the tube was so small, she used it all up.  No abdominal pain, no dysuria.  No arthritis.  No new meds/changes in medications.  No recent infections. Feeling well otherwise with no other rashes or symptoms.  Is a .    Review of Systems   Constitutional: Negative for fever, chills, weight loss, weight gain, fatigue, night sweats and malaise.   Respiratory: Negative for shortness of breath.    Skin: Positive for rash. Negative for daily sunscreen use, activity-related sunscreen use and wears hat.   Hematologic/Lymphatic: Does not bruise/bleed easily.        Objective:    Physical Exam   Constitutional: She appears well-developed and well-nourished. No distress.   Neurological: She is alert and oriented to person, place, and time. She is not disoriented.   Psychiatric: She has a normal mood and affect.   Skin:   Areas Examined (abnormalities noted in diagram):   Scalp / Hair Palpated and Inspected  Head / Face Inspection Performed  Neck Inspection Performed  Chest / Axilla Inspection Performed  Abdomen Inspection Performed  Genitals / Buttocks / Groin Inspection Performed  Back Inspection Performed  RUE Inspected  LUE Inspection Performed  RLE Inspected  LLE Inspection Performed  Nails and Digits Inspection Performed                  Diagram Legend     Erythematous scaling macule/papule c/w actinic keratosis       Vascular papule c/w angioma      Pigmented verrucoid papule/plaque c/w seborrheic keratosis      Yellow umbilicated  papule c/w sebaceous hyperplasia      Irregularly shaped tan macule c/w lentigo     1-2 mm smooth white papules consistent with Milia      Movable subcutaneous cyst with punctum c/w epidermal inclusion cyst      Subcutaneous movable cyst c/w pilar cyst      Firm pink to brown papule c/w dermatofibroma      Pedunculated fleshy papule(s) c/w skin tag(s)      Evenly pigmented macule c/w junctional nevus     Mildly variegated pigmented, slightly irregular-bordered macule c/w mildly atypical nevus      Flesh colored to evenly pigmented papule c/w intradermal nevus       Pink pearly papule/plaque c/w basal cell carcinoma      Erythematous hyperkeratotic cursted plaque c/w SCC      Surgical scar with no sign of skin cancer recurrence      Open and closed comedones      Inflammatory papules and pustules      Verrucoid papule consistent consistent with wart     Erythematous eczematous patches and plaques     Dystrophic onycholytic nail with subungual debris c/w onychomycosis     Umbilicated papule    Erythematous-base heme-crusted tan verrucoid plaque consistent with inflamed seborrheic keratosis     Erythematous Silvery Scaling Plaque c/w Psoriasis     See annotation      Assessment / Plan:      Disease of skin and subcutaneous tissue  Punch biopsy procedure note:  Punch biopsy performed after verbal consent obtained. Area marked and prepped with alcohol. Approximately 1cc of 1% lidocaine with epinephrine injected. 4 mm disposable punch used to remove lesion. Hemostasis obtained and biopsy site closed with 1 - 2 Prolene sutures. Wound care instructions reviewed with patient and handout given.    -     Specimen to Pathology, Dermatology  Pathology Orders:     Normal Orders This Visit    Specimen to Pathology, Dermatology     Comments:    Number of Specimens:->1  ------------------------->-------------------------  Spec 1 Procedure:->Biopsy  Spec 1 Clinical Impression:->r/o LCV vs urticarial vasculitis  vs drug reaction vs  other  Spec 1 Source:->R thigh    Questions:    Procedure Type: Dermatology and skin neoplasms    Number of Specimens: 1    ------------------------: -------------------------    Spec 1 Procedure: Biopsy    Spec 1 Clinical Impression: r/o LCV vs urticarial vasculitis vs drug reaction vs other    Spec 1 Source: R thigh          Pruritus  -     triamcinolone acetonide 0.1% (KENALOG) 0.1 % cream; AAA BID x 1-2 wks then prn flares only  Dispense: 454 g; Refill: 1      Follow up in about 2 weeks (around 9/9/2020) for suture removal and biopsy results.

## 2020-08-29 ENCOUNTER — HOSPITAL ENCOUNTER (OUTPATIENT)
Dept: RADIOLOGY | Facility: HOSPITAL | Age: 55
Discharge: HOME | End: 2020-08-29
Attending: INTERNAL MEDICINE
Payer: COMMERCIAL

## 2020-08-29 DIAGNOSIS — Z12.31 ENCOUNTER FOR SCREENING MAMMOGRAM FOR MALIGNANT NEOPLASM OF BREAST: ICD-10-CM

## 2020-08-29 PROCEDURE — 77067 SCR MAMMO BI INCL CAD: CPT | Mod: 26,,, | Performed by: RADIOLOGY

## 2020-08-29 PROCEDURE — 77067 MAMMO DIGITAL SCREENING BILAT WITH TOMOSYNTHESIS_CAD: ICD-10-PCS | Mod: 26,,, | Performed by: RADIOLOGY

## 2020-08-29 PROCEDURE — 77063 BREAST TOMOSYNTHESIS BI: CPT | Mod: 26,,, | Performed by: RADIOLOGY

## 2020-08-29 PROCEDURE — 77063 MAMMO DIGITAL SCREENING BILAT WITH TOMOSYNTHESIS_CAD: ICD-10-PCS | Mod: 26,,, | Performed by: RADIOLOGY

## 2020-08-29 PROCEDURE — 77067 SCR MAMMO BI INCL CAD: CPT | Mod: TC

## 2020-09-01 LAB
FINAL PATHOLOGIC DIAGNOSIS: NORMAL
GROSS: NORMAL
MICROSCOPIC EXAM: NORMAL

## 2020-09-02 ENCOUNTER — OFFICE VISIT (OUTPATIENT)
Dept: FAMILY MEDICINE | Facility: CLINIC | Age: 55
End: 2020-09-02
Payer: COMMERCIAL

## 2020-09-02 VITALS
WEIGHT: 245.38 LBS | DIASTOLIC BLOOD PRESSURE: 76 MMHG | TEMPERATURE: 98 F | HEIGHT: 71 IN | SYSTOLIC BLOOD PRESSURE: 132 MMHG | BODY MASS INDEX: 34.35 KG/M2 | HEART RATE: 74 BPM | OXYGEN SATURATION: 98 %

## 2020-09-02 DIAGNOSIS — I10 ESSENTIAL HYPERTENSION: ICD-10-CM

## 2020-09-02 DIAGNOSIS — T50.2X5A DIURETIC-INDUCED HYPOKALEMIA: ICD-10-CM

## 2020-09-02 DIAGNOSIS — L29.9 PRURITUS: ICD-10-CM

## 2020-09-02 DIAGNOSIS — Z86.73 HISTORY OF STROKE: ICD-10-CM

## 2020-09-02 DIAGNOSIS — Z11.4 ENCOUNTER FOR SCREENING FOR HIV: ICD-10-CM

## 2020-09-02 DIAGNOSIS — E87.6 DIURETIC-INDUCED HYPOKALEMIA: ICD-10-CM

## 2020-09-02 DIAGNOSIS — E78.5 HYPERLIPIDEMIA, UNSPECIFIED HYPERLIPIDEMIA TYPE: ICD-10-CM

## 2020-09-02 DIAGNOSIS — R73.03 PRE-DIABETES: Primary | ICD-10-CM

## 2020-09-02 DIAGNOSIS — Z11.59 ENCOUNTER FOR HEPATITIS C SCREENING TEST FOR LOW RISK PATIENT: ICD-10-CM

## 2020-09-02 DIAGNOSIS — R21 RASH/SKIN ERUPTION: ICD-10-CM

## 2020-09-02 PROCEDURE — 3078F PR MOST RECENT DIASTOLIC BLOOD PRESSURE < 80 MM HG: ICD-10-PCS | Mod: CPTII,S$GLB,, | Performed by: INTERNAL MEDICINE

## 2020-09-02 PROCEDURE — 3008F PR BODY MASS INDEX (BMI) DOCUMENTED: ICD-10-PCS | Mod: CPTII,S$GLB,, | Performed by: INTERNAL MEDICINE

## 2020-09-02 PROCEDURE — 3075F PR MOST RECENT SYSTOLIC BLOOD PRESS GE 130-139MM HG: ICD-10-PCS | Mod: CPTII,S$GLB,, | Performed by: INTERNAL MEDICINE

## 2020-09-02 PROCEDURE — 3008F BODY MASS INDEX DOCD: CPT | Mod: CPTII,S$GLB,, | Performed by: INTERNAL MEDICINE

## 2020-09-02 PROCEDURE — 3078F DIAST BP <80 MM HG: CPT | Mod: CPTII,S$GLB,, | Performed by: INTERNAL MEDICINE

## 2020-09-02 PROCEDURE — 3075F SYST BP GE 130 - 139MM HG: CPT | Mod: CPTII,S$GLB,, | Performed by: INTERNAL MEDICINE

## 2020-09-02 PROCEDURE — 99999 PR PBB SHADOW E&M-EST. PATIENT-LVL III: CPT | Mod: PBBFAC,,, | Performed by: INTERNAL MEDICINE

## 2020-09-02 PROCEDURE — 99214 OFFICE O/P EST MOD 30 MIN: CPT | Mod: S$GLB,,, | Performed by: INTERNAL MEDICINE

## 2020-09-02 PROCEDURE — 99214 PR OFFICE/OUTPT VISIT, EST, LEVL IV, 30-39 MIN: ICD-10-PCS | Mod: S$GLB,,, | Performed by: INTERNAL MEDICINE

## 2020-09-02 PROCEDURE — 99999 PR PBB SHADOW E&M-EST. PATIENT-LVL III: ICD-10-PCS | Mod: PBBFAC,,, | Performed by: INTERNAL MEDICINE

## 2020-09-02 RX ORDER — POTASSIUM CHLORIDE 750 MG/1
20 TABLET, EXTENDED RELEASE ORAL 2 TIMES DAILY
Qty: 120 TABLET | Refills: 0 | Status: SHIPPED | OUTPATIENT
Start: 2020-09-02 | End: 2020-12-10

## 2020-09-02 RX ORDER — ATORVASTATIN CALCIUM 20 MG/1
20 TABLET, FILM COATED ORAL DAILY
Qty: 90 TABLET | Refills: 0 | Status: SHIPPED | OUTPATIENT
Start: 2020-09-02 | End: 2020-10-06 | Stop reason: SDUPTHER

## 2020-09-02 RX ORDER — CARVEDILOL 25 MG/1
25 TABLET ORAL 2 TIMES DAILY WITH MEALS
Qty: 180 TABLET | Refills: 0 | Status: SHIPPED | OUTPATIENT
Start: 2020-09-02 | End: 2020-09-06

## 2020-09-02 NOTE — PROGRESS NOTES
Subjective:       Patient ID: Lori Burgess is a 54 y.o. female.    Chief Complaint: Establish Care    Patient is here to get established with PCP. Blood pressure has been controlled. Main complain is of rash, itching, and swelling last 5-6 days ago after eating pizza. Rash presented to low extremities but also in right arm after eating fries with Ketchup day before yesterday. She saw dermatologist who performed skin biopsy and results are pending (urticarial vasculitis vs allergic reaction considered). Rash seems to be slowly getting better and itching improves with kenalog cream and Benadryl. Denies fever, SO, cough, nasal congestion, chest pain, or GI symptoms.    Review of Systems   Constitutional: Negative for activity change, appetite change, chills, fatigue, fever and unexpected weight change.   HENT: Negative for nasal congestion, hearing loss, rhinorrhea, sore throat and trouble swallowing.    Eyes: Negative for discharge, redness and visual disturbance.   Respiratory: Negative for cough, chest tightness, shortness of breath and wheezing.    Cardiovascular: Positive for leg swelling. Negative for chest pain, palpitations and claudication.   Gastrointestinal: Negative for abdominal pain, blood in stool, change in bowel habit, constipation, diarrhea, nausea, vomiting and change in bowel habit.   Endocrine: Negative for polydipsia and polyuria.   Genitourinary: Negative for bladder incontinence, difficulty urinating, dysuria, hematuria and menstrual problem.   Musculoskeletal: Negative for arthralgias, joint swelling and neck pain.   Integumentary:  Positive for rash.   Neurological: Negative for dizziness, weakness, light-headedness, numbness, headaches and memory loss.   Hematological: Does not bruise/bleed easily.   Psychiatric/Behavioral: Negative for confusion, dysphoric mood, sleep disturbance and suicidal ideas. The patient is not nervous/anxious.          Objective:       Vitals:    09/02/20 1313    BP: 132/76   Pulse: 74   Temp: 98.2 °F (36.8 °C)   Body mass index is 34.22 kg/m².  Physical Exam  Constitutional:       Appearance: Normal appearance.   HENT:      Head: Normocephalic.      Mouth/Throat:      Mouth: Mucous membranes are moist.      Pharynx: No oropharyngeal exudate.   Eyes:      General: No scleral icterus.     Extraocular Movements: Extraocular movements intact.      Conjunctiva/sclera: Conjunctivae normal.      Pupils: Pupils are equal, round, and reactive to light.   Neck:      Musculoskeletal: Neck supple.      Vascular: No carotid bruit.   Cardiovascular:      Rate and Rhythm: Normal rate and regular rhythm.      Pulses: Normal pulses.      Heart sounds: Normal heart sounds.   Pulmonary:      Effort: Pulmonary effort is normal.      Breath sounds: Normal breath sounds.   Abdominal:      General: Bowel sounds are normal. There is no distension.      Palpations: Abdomen is soft. There is no mass.      Tenderness: There is no abdominal tenderness.   Musculoskeletal: Normal range of motion.         General: Swelling present.   Lymphadenopathy:      Cervical: No cervical adenopathy.   Skin:     General: Skin is warm.      Findings: Rash (scattered macular rash, some with central dark  discoloration.) present.   Neurological:      General: No focal deficit present.      Mental Status: She is alert and oriented to person, place, and time.   Psychiatric:         Mood and Affect: Mood normal.         Assessment:       1. History of stroke    2. Essential hypertension    3. Diuretic-induced hypokalemia    4. Rash/skin eruption        Plan:   Lori was seen today for establish care.    Diagnoses and all orders for this visit:    History of stroke  -     atorvastatin (LIPITOR) 20 MG tablet; Take 1 tablet (20 mg total) by mouth once daily.  - Follow lipid profile.    Essential hypertension  -     carvediloL (COREG) 25 MG tablet; Take 1 tablet (25 mg total) by mouth 2 (two) times daily with meals.  -  Diet and weight control recommended.    Diuretic-induced hypokalemia  -     potassium chloride (KLOR-CON) 10 MEQ TbSR; Take 2 tablets (20 mEq total) by mouth 2 (two) times daily.    Other orders  -     Lipid Panel; Future  -     HIV 1/2 Ag/Ab (4th Gen); Future  -     Hepatitis C Antibody; Future  -     Hemoglobin A1C; Future  -     Comprehensive metabolic panel; Future  -  Reevaluate for due vaccines in next visit.  - Follow dermatologist evaluation.

## 2020-09-03 ENCOUNTER — TELEPHONE (OUTPATIENT)
Dept: DERMATOLOGY | Facility: CLINIC | Age: 55
End: 2020-09-03

## 2020-09-03 DIAGNOSIS — L95.9 VASCULITIS DETERMINED BY BIOPSY OF SKIN: Primary | ICD-10-CM

## 2020-09-03 NOTE — TELEPHONE ENCOUNTER
Final Pathologic Diagnosis Skin, right thigh, punch biopsy:   -LEUKOCYTOCLASTIC VASCULITIS, CONSISTENT WITH      I called Ms. Burgess and discussed biopsy results. She says most lesions are fading, but she recently developed new ones after eating ketchup. Is developing some swelling of lower legs despite taking her fluid pills, and had some discomfort of her lower legs last night which has resolved. No other symptoms.  Will order blood workup for LCV, and pt will complete this when she comes next Thurs for suture removal. I also informed her I may be taking another biopsy at that time (for DIF), especially if she's still getting new lesions.

## 2020-09-10 ENCOUNTER — OFFICE VISIT (OUTPATIENT)
Dept: DERMATOLOGY | Facility: CLINIC | Age: 55
End: 2020-09-10
Payer: COMMERCIAL

## 2020-09-10 ENCOUNTER — LAB VISIT (OUTPATIENT)
Dept: LAB | Facility: HOSPITAL | Age: 55
End: 2020-09-10
Attending: DERMATOLOGY
Payer: COMMERCIAL

## 2020-09-10 DIAGNOSIS — M31.0 LEUKOCYTOCLASTIC VASCULITIS: Primary | ICD-10-CM

## 2020-09-10 DIAGNOSIS — L95.9 VASCULITIS DETERMINED BY BIOPSY OF SKIN: ICD-10-CM

## 2020-09-10 DIAGNOSIS — L29.9 PRURITUS: ICD-10-CM

## 2020-09-10 LAB
BACTERIA #/AREA URNS AUTO: ABNORMAL /HPF
BILIRUB UR QL STRIP: NEGATIVE
CLARITY UR REFRACT.AUTO: ABNORMAL
COLOR UR AUTO: ABNORMAL
GLUCOSE UR QL STRIP: NEGATIVE
HGB UR QL STRIP: NEGATIVE
HYALINE CASTS UR QL AUTO: 5 /LPF
KETONES UR QL STRIP: NEGATIVE
LEUKOCYTE ESTERASE UR QL STRIP: ABNORMAL
MICROSCOPIC COMMENT: ABNORMAL
NITRITE UR QL STRIP: NEGATIVE
PH UR STRIP: 5 [PH] (ref 5–8)
PROT UR QL STRIP: ABNORMAL
RBC #/AREA URNS AUTO: 1 /HPF (ref 0–4)
SP GR UR STRIP: 1.03 (ref 1–1.03)
SQUAMOUS #/AREA URNS AUTO: 4 /HPF
URN SPEC COLLECT METH UR: ABNORMAL
WBC #/AREA URNS AUTO: 65 /HPF (ref 0–5)

## 2020-09-10 PROCEDURE — 99999 PR PBB SHADOW E&M-EST. PATIENT-LVL II: ICD-10-PCS | Mod: PBBFAC,,, | Performed by: DERMATOLOGY

## 2020-09-10 PROCEDURE — 11104 PUNCH BX SKIN SINGLE LESION: CPT | Mod: S$GLB,,, | Performed by: DERMATOLOGY

## 2020-09-10 PROCEDURE — 88346 IMFLUOR 1ST 1ANTB STAIN PX: CPT | Performed by: PATHOLOGY

## 2020-09-10 PROCEDURE — 88350 IMFLUOR EA ADDL 1ANTB STN PX: CPT | Mod: 91 | Performed by: PATHOLOGY

## 2020-09-10 PROCEDURE — 99999 PR PBB SHADOW E&M-EST. PATIENT-LVL II: CPT | Mod: PBBFAC,,, | Performed by: DERMATOLOGY

## 2020-09-10 PROCEDURE — 81001 URINALYSIS AUTO W/SCOPE: CPT

## 2020-09-10 PROCEDURE — 99212 OFFICE O/P EST SF 10 MIN: CPT | Mod: 25,S$GLB,, | Performed by: DERMATOLOGY

## 2020-09-10 PROCEDURE — 99212 PR OFFICE/OUTPT VISIT, EST, LEVL II, 10-19 MIN: ICD-10-PCS | Mod: 25,S$GLB,, | Performed by: DERMATOLOGY

## 2020-09-10 PROCEDURE — 11104 PR PUNCH BIOPSY, SKIN, SINGLE LESION: ICD-10-PCS | Mod: S$GLB,,, | Performed by: DERMATOLOGY

## 2020-09-10 NOTE — PATIENT INSTRUCTIONS
"Punch Biopsy Wound Care    Your doctor has performed a punch biopsy today.  A band aid and antibiotic ointment has been placed over the site.  This should remain in place for 24 hours.  It is recommended that you keep the area dry for the first 24 hours.  After 24 hours, you may remove the band aid and wash the area with warm soap and water and apply Vaseline jelly.  Many patients prefer to use Neosporin or Bacitracin ointment.  This is acceptable; however know that you can develop an allergy to this medication even if you have used it safely for years.  It is important to keep the area moist.  Letting it dry out and get air slows healing time, will worsen the scar, and make it more difficult to remove the stitches if they were placed.  Band aid is optional after first 24 hours.      If you notice increasing redness, tenderness, pain, or yellow drainage at the biopsy or surgical site, please notify your doctor.  These are signs of an infection.    If your biopsy/surgical site is bleeding, apply firm pressure for 15 minutes straight.  Repeat for another 15 minutes, if it is still bleeding.   If the surgical site continues to bleed, then please contact your doctor.      For [facility] users:   You will receive a [facility] notification after the pathologist has finished reviewing your biopsy specimen. Pathology results, however, will not be released online so you will see a "no content" message. Once your dermatologist reviews and clinically correlates your biopsy results, you will either receive a letter in the mail with the results of a phone call from your doctor's office if further explanation or treatment is warranted.       [address] [place] [address]/ [phone] FAX/ [email or web address]           "

## 2020-09-10 NOTE — PROGRESS NOTES
Subjective:       Patient ID:  Lori Burgess is a 54 y.o. female who presents for   Chief Complaint   Patient presents with    Rash     HPI  Pt here today for suture removal and biopsy results.  States the redness started improving after she re-started her fluid pill.  Does still have some red spots, and some are thicker and more tender around the ankles. Most of her spots are darker/more brown now.  Does report that since last visit, she experienced abdominal pain on Saturday which lasted through Tuesday. Did have diarrhea after taking magnesium citrate. This has since resolved. Thought she had food poisoning from crawfish pasta.    Has not noticed any hematuria. No arthritis.  No new meds/changes in medications.  No recent infections. Feeling well otherwise with no other rashes or symptoms.  Is a .    Review of Systems   Constitutional: Negative for fever, chills and malaise.   Gastrointestinal: Negative for nausea, vomiting, abdominal pain and diarrhea.   Genitourinary: Negative for dysuria and hematuria.   Musculoskeletal: Negative for arthralgias.   Skin: Positive for rash. Negative for itching.        Objective:    Physical Exam   Constitutional: She appears well-developed and well-nourished. No distress.   Neurological: She is alert and oriented to person, place, and time. She is not disoriented.   Psychiatric: She has a normal mood and affect.   Skin:   Areas Examined (abnormalities noted in diagram):   Head / Face Inspection Performed  Neck Inspection Performed  Abdomen Inspection Performed  Back Inspection Performed  RUE Inspected  LUE Inspection Performed  RLE Inspected              Diagram Legend     Erythematous scaling macule/papule c/w actinic keratosis       Vascular papule c/w angioma      Pigmented verrucoid papule/plaque c/w seborrheic keratosis      Yellow umbilicated papule c/w sebaceous hyperplasia      Irregularly shaped tan macule c/w lentigo     1-2 mm smooth white  papules consistent with Milia      Movable subcutaneous cyst with punctum c/w epidermal inclusion cyst      Subcutaneous movable cyst c/w pilar cyst      Firm pink to brown papule c/w dermatofibroma      Pedunculated fleshy papule(s) c/w skin tag(s)      Evenly pigmented macule c/w junctional nevus     Mildly variegated pigmented, slightly irregular-bordered macule c/w mildly atypical nevus      Flesh colored to evenly pigmented papule c/w intradermal nevus       Pink pearly papule/plaque c/w basal cell carcinoma      Erythematous hyperkeratotic cursted plaque c/w SCC      Surgical scar with no sign of skin cancer recurrence      Open and closed comedones      Inflammatory papules and pustules      Verrucoid papule consistent consistent with wart     Erythematous eczematous patches and plaques     Dystrophic onycholytic nail with subungual debris c/w onychomycosis     Umbilicated papule    Erythematous-base heme-crusted tan verrucoid plaque consistent with inflamed seborrheic keratosis     Erythematous Silvery Scaling Plaque c/w Psoriasis     See annotation      Assessment / Plan:      Pathology Orders:     Normal Orders This Visit    Specimen to Pathology, Dermatology     Comments:    Please send to [facility] for direct immunofluorescence (keep  specimen in Med's medium)  Number of Specimens:->1  ------------------------->-------------------------  Spec 1 Procedure:->Immunofluorescence (No formalin)  Spec 1 Clinical Impression:->r/o IgA vasculitis vs other  Spec 1 Source:->R lower leg    Questions:    Procedure Type: Dermatology and skin neoplasms    Number of Specimens: 1    ------------------------: -------------------------    Spec 1 Procedure: Immunofluorescence (No formalin)    Spec 1 Clinical Impression: r/o IgA vasculitis vs other    Spec 1 Source: R lower leg        Leukocytoclastic vasculitis  Will perform lab workup today. Suspect IgA vasculitis due to distribution of lesions and recent hx of abdominal  pain. Bx for DIF taken today.  If IgA vasculitis is confirmed, will consider heme/onc referral to r/o any underlying occult malignancy.  May continue TAC cream for now to lesions and Nadinola to PIPA.    Follow up in about 2 weeks (around 9/24/2020) for suture removal and biopsy results.

## 2020-09-14 ENCOUNTER — PATIENT MESSAGE (OUTPATIENT)
Dept: FAMILY MEDICINE | Facility: CLINIC | Age: 55
End: 2020-09-14

## 2020-09-14 ENCOUNTER — TELEPHONE (OUTPATIENT)
Dept: FAMILY MEDICINE | Facility: CLINIC | Age: 55
End: 2020-09-14

## 2020-09-14 DIAGNOSIS — R82.81 PYURIA: Primary | ICD-10-CM

## 2020-09-14 DIAGNOSIS — E87.6 HYPOKALEMIA: Primary | ICD-10-CM

## 2020-09-14 DIAGNOSIS — R82.81 PYURIA: ICD-10-CM

## 2020-09-14 NOTE — TELEPHONE ENCOUNTER
----- Message from Anna Dos Santos MD sent at 9/14/2020  7:45 AM CDT -----  Results were reviewed and while some results are pending it was noted an abnormal urine and Serum Potasium. I would like to repeat both a urinalysis with added culture and the metabolic profile to reevaluate potasium. Please make follow up appointment to reevaluate treatment.

## 2020-09-15 ENCOUNTER — PATIENT MESSAGE (OUTPATIENT)
Dept: FAMILY MEDICINE | Facility: CLINIC | Age: 55
End: 2020-09-15

## 2020-09-15 ENCOUNTER — LAB VISIT (OUTPATIENT)
Dept: LAB | Facility: HOSPITAL | Age: 55
End: 2020-09-15
Attending: INTERNAL MEDICINE
Payer: COMMERCIAL

## 2020-09-15 DIAGNOSIS — E78.5 HYPERLIPIDEMIA, UNSPECIFIED HYPERLIPIDEMIA TYPE: ICD-10-CM

## 2020-09-15 DIAGNOSIS — R73.03 PRE-DIABETES: ICD-10-CM

## 2020-09-15 DIAGNOSIS — E87.6 HYPOKALEMIA: ICD-10-CM

## 2020-09-15 LAB
ANION GAP SERPL CALC-SCNC: 11 MMOL/L (ref 8–16)
BUN SERPL-MCNC: 13 MG/DL (ref 6–20)
CALCIUM SERPL-MCNC: 9.5 MG/DL (ref 8.7–10.5)
CHLORIDE SERPL-SCNC: 101 MMOL/L (ref 95–110)
CHOLEST SERPL-MCNC: 127 MG/DL (ref 120–199)
CHOLEST/HDLC SERPL: 3 {RATIO} (ref 2–5)
CO2 SERPL-SCNC: 30 MMOL/L (ref 23–29)
CREAT SERPL-MCNC: 0.8 MG/DL (ref 0.5–1.4)
EST. GFR  (AFRICAN AMERICAN): >60 ML/MIN/1.73 M^2
EST. GFR  (NON AFRICAN AMERICAN): >60 ML/MIN/1.73 M^2
GLUCOSE SERPL-MCNC: 102 MG/DL (ref 70–110)
HDLC SERPL-MCNC: 43 MG/DL (ref 40–75)
HDLC SERPL: 33.9 % (ref 20–50)
LDLC SERPL CALC-MCNC: 68 MG/DL (ref 63–159)
NONHDLC SERPL-MCNC: 84 MG/DL
POTASSIUM SERPL-SCNC: 3.5 MMOL/L (ref 3.5–5.1)
SODIUM SERPL-SCNC: 142 MMOL/L (ref 136–145)
TRIGL SERPL-MCNC: 80 MG/DL (ref 30–150)

## 2020-09-15 PROCEDURE — 36415 COLL VENOUS BLD VENIPUNCTURE: CPT

## 2020-09-15 PROCEDURE — 80048 BASIC METABOLIC PNL TOTAL CA: CPT

## 2020-09-15 PROCEDURE — 80061 LIPID PANEL: CPT

## 2020-09-15 NOTE — TELEPHONE ENCOUNTER
Urinalysis results seen, chart reviewed, and case discussed with Ms. Burgess. Noted elevated wbc and improved protein. No symptoms of UTI and no fever. Metabolic panel normal. We will follow cultures to evaluate for infection and follow urine fairbanks stain for eosinophils. We will talk again once results are back for plan of care.  She also mentioned nasal congestion and taking Sudafed with little help. Saline spray and Claritin trial recommended and we can reevaluate when we talk again.

## 2020-09-16 ENCOUNTER — TELEPHONE (OUTPATIENT)
Dept: FAMILY MEDICINE | Facility: CLINIC | Age: 55
End: 2020-09-16

## 2020-09-16 DIAGNOSIS — Z22.338 STREPTOCOCCUS A CARRIER OR SUSPECTED CARRIER: Primary | ICD-10-CM

## 2020-09-16 NOTE — TELEPHONE ENCOUNTER
Spoke with pt. Informed her she would need to come in for an appointment ,patient was busy during call. Patient was told to call our office back once she has time

## 2020-09-16 NOTE — TELEPHONE ENCOUNTER
----- Message from Anna Dos Santos MD sent at 9/16/2020  1:05 PM CDT -----  Regarding: Nurse visit for strep throat test  Please call patient to come by the office to have test done before her visit.

## 2020-09-17 LAB
FINAL PATHOLOGIC DIAGNOSIS: NORMAL
GROSS: NORMAL

## 2020-09-18 ENCOUNTER — TELEPHONE (OUTPATIENT)
Dept: DERMATOLOGY | Facility: CLINIC | Age: 55
End: 2020-09-18

## 2020-09-18 ENCOUNTER — TELEPHONE (OUTPATIENT)
Dept: FAMILY MEDICINE | Facility: CLINIC | Age: 55
End: 2020-09-18

## 2020-09-18 DIAGNOSIS — D69.0 IGA MEDIATED LEUKOCYTOCLASTIC VASCULITIS: Primary | ICD-10-CM

## 2020-09-18 DIAGNOSIS — N39.0 URINARY TRACT INFECTION WITHOUT HEMATURIA, SITE UNSPECIFIED: Primary | ICD-10-CM

## 2020-09-18 RX ORDER — CEFDINIR 300 MG/1
300 CAPSULE ORAL 2 TIMES DAILY
Qty: 20 CAPSULE | Refills: 0 | Status: SHIPPED | OUTPATIENT
Start: 2020-09-18 | End: 2020-09-28

## 2020-09-18 NOTE — TELEPHONE ENCOUNTER
Final Pathologic Diagnosis Cutaneous Direct IFA, Biopsy Case   Interpretation:   A. Right lower leg, skin punch biopsy, Immunofluorescence:   IgG: Negative   IgM: Discontinuous weak granular basement membrane zone; rare weak   superficial blood vessels   IgA: Several superficial blood vessels   C3: Few weak superficial blood vessels   Fibrinogen: Few weak superficial blood vessels; patchy staining of connective   tissue fibers   IMPRESSION   Suggestive for an IgA vasculitis (see comment)   COMMENT   The vascular staining pattern seen with IgA is highly suggestive for an IgA   vasculitis. Nonspecific vascular staining can occur in anatomically dependent   areas. Recommend correlation with routine pathology on fixed tissue, clinical   presentation and other diagnostic tests.   José Miguel Fraga M.D.      I called Ms. Burgess and explained that her second biopsy came back in support of IgA vasculitis. Explained that this could be associated with kidney disease, which could develop up to 3 months after the rash appears, so it is important that she follow up with her PCP after treatment of the UTI for repeat U/A and kidney function tests (consider monthly x 3 months). I also recommended seeing heme/onc in the rare event that this was triggered by an occult malignancy. Will place the referral for this. Pt states she has not gotten any new lesions on her skin, but she will notify me if more develop.

## 2020-09-18 NOTE — TELEPHONE ENCOUNTER
The urine culture shows E. Coli, a bacteria that is commonly seen in urinary tract infections. The bacteria is sensitive to multiple antibiotics and I will prescribe one (Cefdinir) that covers respiratory tract and urine. It would be good to have the throat culture and rapid strep test to know if the bacteria causing Strept throat is involved with the rash you have been having. We will repeat labs when treatment is completed.  Please keep your follow up appointment with dermatology.

## 2020-09-22 ENCOUNTER — TELEPHONE (OUTPATIENT)
Dept: HEMATOLOGY/ONCOLOGY | Facility: CLINIC | Age: 55
End: 2020-09-22

## 2020-09-24 ENCOUNTER — PATIENT MESSAGE (OUTPATIENT)
Dept: DERMATOLOGY | Facility: CLINIC | Age: 55
End: 2020-09-24

## 2020-09-25 ENCOUNTER — PATIENT MESSAGE (OUTPATIENT)
Dept: DERMATOLOGY | Facility: CLINIC | Age: 55
End: 2020-09-25

## 2020-09-29 ENCOUNTER — LAB VISIT (OUTPATIENT)
Dept: LAB | Facility: HOSPITAL | Age: 55
End: 2020-09-29
Payer: COMMERCIAL

## 2020-09-29 ENCOUNTER — OFFICE VISIT (OUTPATIENT)
Dept: HEMATOLOGY/ONCOLOGY | Facility: CLINIC | Age: 55
End: 2020-09-29
Payer: COMMERCIAL

## 2020-09-29 ENCOUNTER — CLINICAL SUPPORT (OUTPATIENT)
Dept: DERMATOLOGY | Facility: CLINIC | Age: 55
End: 2020-09-29
Payer: COMMERCIAL

## 2020-09-29 VITALS
OXYGEN SATURATION: 99 % | RESPIRATION RATE: 17 BRPM | HEIGHT: 71 IN | WEIGHT: 239.69 LBS | BODY MASS INDEX: 33.56 KG/M2 | SYSTOLIC BLOOD PRESSURE: 139 MMHG | HEART RATE: 63 BPM | DIASTOLIC BLOOD PRESSURE: 75 MMHG | TEMPERATURE: 98 F

## 2020-09-29 DIAGNOSIS — R53.82 CHRONIC FATIGUE: ICD-10-CM

## 2020-09-29 DIAGNOSIS — I63.30 CEREBRAL THROMBOSIS WITH CEREBRAL INFARCTION: ICD-10-CM

## 2020-09-29 DIAGNOSIS — R13.19 OTHER DYSPHAGIA: ICD-10-CM

## 2020-09-29 DIAGNOSIS — Z48.02 VISIT FOR SUTURE REMOVAL: Primary | ICD-10-CM

## 2020-09-29 DIAGNOSIS — E78.5 HYPERLIPIDEMIA, UNSPECIFIED HYPERLIPIDEMIA TYPE: ICD-10-CM

## 2020-09-29 DIAGNOSIS — R63.4 WEIGHT LOSS: ICD-10-CM

## 2020-09-29 DIAGNOSIS — Z87.19 HISTORY OF GASTROESOPHAGEAL REFLUX (GERD): ICD-10-CM

## 2020-09-29 DIAGNOSIS — I10 ESSENTIAL HYPERTENSION: Primary | ICD-10-CM

## 2020-09-29 DIAGNOSIS — D69.0 IGA MEDIATED LEUKOCYTOCLASTIC VASCULITIS: ICD-10-CM

## 2020-09-29 DIAGNOSIS — D69.0 IGA MEDIATED LEUKOCYTOCLASTIC VASCULITIS: Primary | ICD-10-CM

## 2020-09-29 LAB
ALBUMIN SERPL BCP-MCNC: 3.4 G/DL (ref 3.5–5.2)
ALP SERPL-CCNC: 91 U/L (ref 55–135)
ALT SERPL W/O P-5'-P-CCNC: 13 U/L (ref 10–44)
ANION GAP SERPL CALC-SCNC: 8 MMOL/L (ref 8–16)
AST SERPL-CCNC: 12 U/L (ref 10–40)
BASOPHILS # BLD AUTO: 0.03 K/UL (ref 0–0.2)
BASOPHILS NFR BLD: 0.6 % (ref 0–1.9)
BILIRUB SERPL-MCNC: 0.8 MG/DL (ref 0.1–1)
BUN SERPL-MCNC: 15 MG/DL (ref 6–20)
CALCIUM SERPL-MCNC: 9.5 MG/DL (ref 8.7–10.5)
CHLORIDE SERPL-SCNC: 102 MMOL/L (ref 95–110)
CO2 SERPL-SCNC: 32 MMOL/L (ref 23–29)
CREAT SERPL-MCNC: 1 MG/DL (ref 0.5–1.4)
DIFFERENTIAL METHOD: ABNORMAL
EOSINOPHIL # BLD AUTO: 0.1 K/UL (ref 0–0.5)
EOSINOPHIL NFR BLD: 1.9 % (ref 0–8)
ERYTHROCYTE [DISTWIDTH] IN BLOOD BY AUTOMATED COUNT: 16.1 % (ref 11.5–14.5)
EST. GFR  (AFRICAN AMERICAN): >60 ML/MIN/1.73 M^2
EST. GFR  (NON AFRICAN AMERICAN): >60 ML/MIN/1.73 M^2
GLUCOSE SERPL-MCNC: 85 MG/DL (ref 70–110)
HCT VFR BLD AUTO: 36.8 % (ref 37–48.5)
HGB BLD-MCNC: 11.1 G/DL (ref 12–16)
IMM GRANULOCYTES # BLD AUTO: 0.01 K/UL (ref 0–0.04)
IMM GRANULOCYTES NFR BLD AUTO: 0.2 % (ref 0–0.5)
LDH SERPL L TO P-CCNC: 160 U/L (ref 110–260)
LYMPHOCYTES # BLD AUTO: 1.4 K/UL (ref 1–4.8)
LYMPHOCYTES NFR BLD: 26.9 % (ref 18–48)
MCH RBC QN AUTO: 24 PG (ref 27–31)
MCHC RBC AUTO-ENTMCNC: 30.2 G/DL (ref 32–36)
MCV RBC AUTO: 80 FL (ref 82–98)
MONOCYTES # BLD AUTO: 0.4 K/UL (ref 0.3–1)
MONOCYTES NFR BLD: 8 % (ref 4–15)
NEUTROPHILS # BLD AUTO: 3.2 K/UL (ref 1.8–7.7)
NEUTROPHILS NFR BLD: 62.4 % (ref 38–73)
NRBC BLD-RTO: 0 /100 WBC
PLATELET # BLD AUTO: 249 K/UL (ref 150–350)
PMV BLD AUTO: 10.9 FL (ref 9.2–12.9)
POTASSIUM SERPL-SCNC: 3.6 MMOL/L (ref 3.5–5.1)
PROT SERPL-MCNC: 7.6 G/DL (ref 6–8.4)
RBC # BLD AUTO: 4.62 M/UL (ref 4–5.4)
SODIUM SERPL-SCNC: 142 MMOL/L (ref 136–145)
T4 FREE SERPL-MCNC: 1.15 NG/DL (ref 0.71–1.51)
TSH SERPL DL<=0.005 MIU/L-ACNC: 0.72 UIU/ML (ref 0.4–4)
WBC # BLD AUTO: 5.13 K/UL (ref 3.9–12.7)

## 2020-09-29 PROCEDURE — 99999 PR PBB SHADOW E&M-EST. PATIENT-LVL IV: CPT | Mod: PBBFAC,,, | Performed by: INTERNAL MEDICINE

## 2020-09-29 PROCEDURE — 85025 COMPLETE CBC W/AUTO DIFF WBC: CPT

## 2020-09-29 PROCEDURE — 99999 PR PBB SHADOW E&M-EST. PATIENT-LVL IV: ICD-10-PCS | Mod: PBBFAC,,, | Performed by: INTERNAL MEDICINE

## 2020-09-29 PROCEDURE — 3078F PR MOST RECENT DIASTOLIC BLOOD PRESSURE < 80 MM HG: ICD-10-PCS | Mod: CPTII,S$GLB,, | Performed by: INTERNAL MEDICINE

## 2020-09-29 PROCEDURE — 81270 JAK2 GENE: CPT

## 2020-09-29 PROCEDURE — 84443 ASSAY THYROID STIM HORMONE: CPT

## 2020-09-29 PROCEDURE — 3008F BODY MASS INDEX DOCD: CPT | Mod: CPTII,S$GLB,, | Performed by: INTERNAL MEDICINE

## 2020-09-29 PROCEDURE — 81219 CALR GENE COM VARIANTS: CPT

## 2020-09-29 PROCEDURE — 3075F SYST BP GE 130 - 139MM HG: CPT | Mod: CPTII,S$GLB,, | Performed by: INTERNAL MEDICINE

## 2020-09-29 PROCEDURE — 99205 PR OFFICE/OUTPT VISIT, NEW, LEVL V, 60-74 MIN: ICD-10-PCS | Mod: S$GLB,,, | Performed by: INTERNAL MEDICINE

## 2020-09-29 PROCEDURE — 36415 COLL VENOUS BLD VENIPUNCTURE: CPT

## 2020-09-29 PROCEDURE — 81403 MOPATH PROCEDURE LEVEL 4: CPT

## 2020-09-29 PROCEDURE — 81001 URINALYSIS AUTO W/SCOPE: CPT

## 2020-09-29 PROCEDURE — 3008F PR BODY MASS INDEX (BMI) DOCUMENTED: ICD-10-PCS | Mod: CPTII,S$GLB,, | Performed by: INTERNAL MEDICINE

## 2020-09-29 PROCEDURE — 3078F DIAST BP <80 MM HG: CPT | Mod: CPTII,S$GLB,, | Performed by: INTERNAL MEDICINE

## 2020-09-29 PROCEDURE — 83615 LACTATE (LD) (LDH) ENZYME: CPT

## 2020-09-29 PROCEDURE — 3075F PR MOST RECENT SYSTOLIC BLOOD PRESS GE 130-139MM HG: ICD-10-PCS | Mod: CPTII,S$GLB,, | Performed by: INTERNAL MEDICINE

## 2020-09-29 PROCEDURE — 99499 NO LOS: ICD-10-PCS | Mod: S$GLB,,, | Performed by: DERMATOLOGY

## 2020-09-29 PROCEDURE — 99499 UNLISTED E&M SERVICE: CPT | Mod: S$GLB,,, | Performed by: DERMATOLOGY

## 2020-09-29 PROCEDURE — 84439 ASSAY OF FREE THYROXINE: CPT

## 2020-09-29 PROCEDURE — 80053 COMPREHEN METABOLIC PANEL: CPT

## 2020-09-29 PROCEDURE — 99205 OFFICE O/P NEW HI 60 MIN: CPT | Mod: S$GLB,,, | Performed by: INTERNAL MEDICINE

## 2020-09-29 NOTE — LETTER
September 29, 2020      Etelvina Doty MD  [address]  [facility] [address]           [facility] [address] [address]  [address]  [facility] [address]  Phone: [phone]          Patient: Lori Burgess   MR Number: [id number]   YOB: 1965   Date of Visit: 9/29/2020       Dear Dr. Etelvina Doty:    Thank you for referring Lori Burgess to me for evaluation. Attached you will find relevant portions of my assessment and plan of care.    If you have questions, please do not hesitate to call me. I look forward to following Lori Burgess along with you.    Sincerely,    Bianca Staton MD    Enclosure  CC:  No Recipients    If you would like to receive this communication electronically, please contact [email or web address] or [phone] to request more information on [facility] access.    For providers and/or their staff who would like to refer a patient to [facility], please contact us through our one-stop-shop provider referral line, [facility], at [phone].    If you feel you have received this communication in error or would no longer like to receive these types of communications, please e-mail [email or web address]

## 2020-09-29 NOTE — PROGRESS NOTES
CC: IgA vasculitis, oncology consultation    Referring physician:      is a 54y/o lady here for oncology consultation. She has HTn, HLD, GERD, CVA. She was evaluated by dermatology () on 8/26/20 for red rash on her legs. It had started a few days prior to her dermatology appointment on her lower legs, but had spread to her upper legs as well . She had also noticed some small spots on her arms. At that time she had no abdominal pain, no dysuria or hematuria .  No arthritis. No new meds/changes in medications. She denies any known in sect or bug bite or recent travel.     Review of Systems   Constitutional: Positive for malaise/fatigue. Negative for chills, diaphoresis, fever and weight loss.   HENT: Negative for congestion, ear discharge, ear pain, hearing loss, nosebleeds and sinus pain.    Eyes: Negative for blurred vision, double vision, photophobia, pain, discharge and redness.   Cardiovascular: Negative for chest pain, palpitations, orthopnea, claudication, leg swelling and PND.   Gastrointestinal: Positive for heartburn. Negative for abdominal pain, constipation, diarrhea, melena, nausea and vomiting.   Genitourinary: Negative for dysuria and frequency.   Musculoskeletal: Negative for back pain and neck pain.   Skin: Positive for rash. Negative for itching.   Neurological: Negative for dizziness, tingling, tremors, sensory change, focal weakness and weakness.   Endo/Heme/Allergies: Does not bruise/bleed easily.   Psychiatric/Behavioral: Negative for depression, hallucinations, substance abuse and suicidal ideas.       Past Medical History:   Diagnosis Date    Anemia     Diverticulosis     History of gastroesophageal reflux (GERD)     Hyperlipidemia     Hypertension     Stroke 2015    Trauma 1998    Fractured Right ankle         Past Surgical History:   Procedure Laterality Date    BARTHOLIN GLAND CYST EXCISION      Marsupialization, not excision    HYSTERECTOMY  10/2006     Laparoscopic Supracervical Hysterectomy    TUBAL LIGATION         Family History   Problem Relation Age of Onset    Colon cancer Mother 55    Hypertension Mother     Diabetes Father     Hypertension Father     Hypertension Brother     Hypertension Sister     Heart disease Neg Hx     Heart attack Neg Hx     Breast cancer Neg Hx     Ovarian cancer Neg Hx            Social History     Socioeconomic History    Marital status: Single     Spouse name: Not on file    Number of children: Not on file    Years of education: Not on file    Highest education level: Not on file   Occupational History     Employer: [employer]   Tobacco Use    Smoking status: Never Smoker    Smokeless tobacco: Never Used   Substance and Sexual Activity    Alcohol use: Not Currently     Frequency: Never     Drinks per session: Patient refused     Binge frequency: Never    Drug use: No    Sexual activity: Not Currently     Birth control/protection: None, Condom, See Surgical Hx     Comment: Hysterectomy   Lifestyle    Physical activity     Days per week: 6 days     Minutes per session: 150+ min    Stress: Only a little             Review of patient's allergies indicates:   Allergen Reactions    Ace inhibitors Swelling    Benicar [olmesartan] Swelling    Methyldopa Other (See Comments)     sleepy    Clonidine Other (See Comments)     sleepy         Current Outpatient Medications   Medication Sig    aspirin (ECOTRIN) 325 MG EC tablet Take 1 tablet (325 mg total) by mouth once daily.    atorvastatin (LIPITOR) 20 MG tablet Take 1 tablet (20 mg total) by mouth once daily.    carvediloL (COREG) 25 MG tablet TAKE 1 TABLET BY MOUTH TWICE DAILY WITH MEALS    NIFEdipine (PROCARDIA-XL) 90 MG (OSM) 24 hr tablet Take 1 tablet (90 mg total) by mouth once daily.    potassium chloride (KLOR-CON) 10 MEQ TbSR Take 2 tablets (20 mEq total) by mouth 2 (two) times daily.    triamcinolone acetonide 0.1% (KENALOG) 0.1 %  cream AAA BID x 1-2 wks then prn flares only    triamterene-hydrochlorothiazide 37.5-25 mg (DYAZIDE) 37.5-25 mg per capsule Take 1 capsule by mouth once daily.     No current facility-administered medications for this visit.        Vitals:    09/29/20 0906   BP: 139/75   Pulse: 63   Resp: 17   Temp: 98.3 °F (36.8 °C)         Physical Exam   Constitutional: She is oriented to person, place, and time. She appears well-developed.   HENT:   Head: Normocephalic and atraumatic.   Mouth/Throat: No oropharyngeal exudate.   Cardiovascular: Normal rate.   No murmur heard.  Pulmonary/Chest: Effort normal. No respiratory distress. She has no wheezes. She has no rales.   Abdominal: She exhibits no distension. There is no rebound.   Lymphadenopathy:     She has no cervical adenopathy.   Neurological: She is alert and oriented to person, place, and time. No cranial nerve deficit.   Skin: Skin is warm.   Evidence of recent skin lesions seen on both LEs, in various stages of healing.    Psychiatric: She has a normal mood and affect.       5/28/15 colonoscopy           The perianal and digital rectal examinations were normal.        The entire examined colon appeared normal.     8/29/20 mammogram     Impression:  No mammographic evidence of malignancy.       BI-RADS Category 1: Negative     Component      Latest Ref Rng & Units 9/15/2020 9/10/2020   WBC      3.90 - 12.70 K/uL  7.14   RBC      4.00 - 5.40 M/uL  4.54   Hemoglobin      12.0 - 16.0 g/dL  11.0 (L)   Hematocrit      37.0 - 48.5 %  35.7 (L)   MCV      82 - 98 fL  79 (L)   MCH      27.0 - 31.0 pg  24.2 (L)   MCHC      32.0 - 36.0 g/dL  30.8 (L)   RDW      11.5 - 14.5 %  16.0 (H)   Platelets      150 - 350 K/uL  299   MPV      9.2 - 12.9 fL  11.2   Immature Granulocytes      0.0 - 0.5 %  0.3   Gran # (ANC)      1.8 - 7.7 K/uL  4.5   Immature Grans (Abs)      0.00 - 0.04 K/uL  0.02   Lymph #      1.0 - 4.8 K/uL  1.8   Mono #      0.3 - 1.0 K/uL  0.5   Eos #      0.0 - 0.5  K/uL  0.3   Baso #      0.00 - 0.20 K/uL  0.04   nRBC      0 /100 WBC  0   Gran%      38.0 - 73.0 %  63.2   Lymph%      18.0 - 48.0 %  25.1   Mono%      4.0 - 15.0 %  6.7   Eosinophil%      0.0 - 8.0 %  4.1   Basophil%      0.0 - 1.9 %  0.6   Differential Method        Automated   Sodium      136 - 145 mmol/L 142 143   Potassium      3.5 - 5.1 mmol/L 3.5 3.1 (L)   Chloride      95 - 110 mmol/L 101 102   CO2      23 - 29 mmol/L 30 (H) 30 (H)   Glucose      70 - 110 mg/dL 102 117 (H)   BUN, Bld      6 - 20 mg/dL 13 23 (H)   Creatinine      0.5 - 1.4 mg/dL 0.8 1.0   Calcium      8.7 - 10.5 mg/dL 9.5 9.4   PROTEIN TOTAL      6.0 - 8.4 g/dL  7.5   Albumin      3.5 - 5.2 g/dL  3.5   BILIRUBIN TOTAL      0.1 - 1.0 mg/dL  0.6   Alkaline Phosphatase      55 - 135 U/L  76   AST      10 - 40 U/L  16   ALT      10 - 44 U/L  15   Anion Gap      8 - 16 mmol/L 11 11   eGFR if African American      >60 mL/min/1.73 m:2 >60 >60.0   eGFR if non African American      >60 mL/min/1.73 m:2 >60 >60.0   Specimen UA       Urine, Unspecified    Color, UA      Yellow, Straw, Yael Yellow    Appearance, UA      Clear Hazy (A)    pH, UA      5.0 - 8.0 7.0    Specific Gravity, UA      1.005 - 1.030 1.020    Protein, UA      Negative Trace (A)    Glucose, UA      Negative Negative    Ketones, UA      Negative Negative    Bilirubin (UA)      Negative Negative    Occult Blood UA      Negative 1+ (A)    NITRITE UA      Negative Negative    UROBILINOGEN UA      <2.0 EU/dL 1.0    Leukocytes, UA      Negative 3+ (A)    RBC, UA      0 - 4 /hpf 5 (H)    WBC, UA      0 - 5 /hpf >100 (H)    WBC Clumps, UA      None-Rare Moderate (A)    Bacteria, UA      None-Occ /hpf Moderate (A)    Squam Epithel, UA      /hpf 2    Microscopic Comment       SEE COMMENT    Cytoplasmic Neutrophilic Ab      <1:20 Titer  <1:20   Perinuclear (P-ANCA)      <1:20 Titer  <1:20   Hepatitis C Ab      Negative  Negative   HIV 1/2 Ag/Ab      Negative  Negative   Sed Rate      0 - 36  mm/Hr  53 (H)   CRP      0.0 - 8.2 mg/L  28.8 (H)   Cryoglobulin, Qual      Absent  Absent   Hepatitis B Surface Ag      Negative  Negative   Hep B S Ab        Negative   Hep B Core Total Ab        Negative   LALY Screen      Negative <1:80  Negative <1:80   Rheumatoid Factor      0.0 - 15.0 IU/mL  <10.0     9/10/20 right leg skin biopsy    Final Pathologic Diagnosis Cutaneous Direct IFA, Biopsy Case   Interpretation:   A. Right lower leg, skin punch biopsy, Immunofluorescence:   IgG: Negative   IgM: Discontinuous weak granular basement membrane zone; rare weak superficial blood vessels   IgA: Several superficial blood vessels   C3: Few weak superficial blood vessels   Fibrinogen: Few weak superficial blood vessels; patchy staining of connective tissue fibers     IMPRESSION   Suggestive for an IgA vasculitis (see comment)   COMMENT   The vascular staining pattern seen with IgA is highly suggestive for an IgA vasculitis. Nonspecific vascular staining can occur in anatomically dependent   areas. Recommend correlation with routine pathology on fixed tissue, clinical presentation and other diagnostic tests.   José Miguel Fraga M.D.     9/10/20 SPEP:  No paraprotein bands identified.   9/10/20 sIFE: No discrete monoclonal peaks identified.     Assessment:    1. IgA vasculitis   2. Htn  3. H/o CVA  4. HLD  5. fatigue      Plan:    1. She has biopsy proven cutaneous IgA vasculitis. Etiology is unclear. No clear h/o preceding infection, or insect/bug bite. Infectious disease and connective tissue work up negative so far. She has family h/o early onset colon cancer ( mother passed away at age 55 from CA colon) and has been screened with periodic colonoscopies, last in 2015. She is due for another colonoscopy now.  She is uptodate with mammogram and PAP smear.   She has no weight loss, cough, abdominal pain or vaginal bleeding,. UA showed sign of UTI on 9/15/20.   SPEP, serum AIDA negative on 9/10/20.   She will have CT neck,  chest, abdomen, pelvis to r/o neoplasm.  I explained that a CT is not foolproof way to rule out all malignancies, but it does rule out significant disease in the lungs, liver, pancreas, parts of GI tract and parts of pelvis.        2,4: She follows with her PCP. She monitors BP at home. Currently WNL.

## 2020-09-30 LAB
BACTERIA #/AREA URNS AUTO: ABNORMAL /HPF
BILIRUB UR QL STRIP: NEGATIVE
CLARITY UR REFRACT.AUTO: ABNORMAL
COLOR UR AUTO: YELLOW
GLUCOSE UR QL STRIP: NEGATIVE
HGB UR QL STRIP: NEGATIVE
HYALINE CASTS UR QL AUTO: 0 /LPF
KETONES UR QL STRIP: NEGATIVE
LEUKOCYTE ESTERASE UR QL STRIP: ABNORMAL
MICROSCOPIC COMMENT: ABNORMAL
NITRITE UR QL STRIP: NEGATIVE
PH UR STRIP: 6 [PH] (ref 5–8)
PROT UR QL STRIP: ABNORMAL
RBC #/AREA URNS AUTO: 1 /HPF (ref 0–4)
SP GR UR STRIP: 1.02 (ref 1–1.03)
SQUAMOUS #/AREA URNS AUTO: 1 /HPF
URN SPEC COLLECT METH UR: ABNORMAL
WBC #/AREA URNS AUTO: 20 /HPF (ref 0–5)

## 2020-10-01 NOTE — PROGRESS NOTES
Suture Removal note:  CC: 55 y.o. female patient is here for suture removal.         HPI: Patient is s/p excision of r/o IgA vasculitis vs other from the R lower leg on 9/10/2020.  Patient reports no problems.    WOUND PE:  Sutures intact.  Wound healing well.  Good approximation of skin edges.  No signs or symptoms of infection.    Final Pathologic Diagnosis  Cutaneous Direct IFA, Biopsy Case   Interpretation:   A. Right lower leg, skin punch biopsy, Immunofluorescence:   IgG: Negative   IgM: Discontinuous weak granular basement membrane zone; rare weak   superficial blood vessels   IgA: Several superficial blood vessels   C3: Few weak superficial blood vessels   Fibrinogen: Few weak superficial blood vessels; patchy staining of connective   tissue fibers   IMPRESSION   Suggestive for an IgA vasculitis (see comment)   COMMENT   The vascular staining pattern seen with IgA is highly suggestive for an IgA   vasculitis. Nonspecific vascular staining can occur in anatomically dependent   areas. Recommend correlation with routine pathology on fixed tissue, clinical   presentation and other diagnostic tests.   José Miguel Fraga M.D.   Report attached.   Performing location:   [place] [address]      IMPRESSION: margins clear.    PLAN:  Sutures removed today.  Continue wound care.    RTC: PRN

## 2020-10-06 ENCOUNTER — TELEPHONE (OUTPATIENT)
Dept: HEMATOLOGY/ONCOLOGY | Facility: CLINIC | Age: 55
End: 2020-10-06

## 2020-10-07 LAB
MPNR  FINAL DIAGNOSIS: NORMAL
MPNR  SPECIMEN TYPE: NORMAL
MPNR RESULT: NORMAL

## 2020-10-22 ENCOUNTER — TELEPHONE (OUTPATIENT)
Dept: HEMATOLOGY/ONCOLOGY | Facility: CLINIC | Age: 55
End: 2020-10-22

## 2020-10-22 NOTE — TELEPHONE ENCOUNTER
----- Message from Alis Desai RN sent at 10/22/2020  8:20 AM CDT -----  Does someone mind giving her a call to see if we can r/s her scans and follow up? She cancelled. Thank you!!

## 2020-10-23 ENCOUNTER — PATIENT MESSAGE (OUTPATIENT)
Dept: HEMATOLOGY/ONCOLOGY | Facility: CLINIC | Age: 55
End: 2020-10-23

## 2020-10-23 ENCOUNTER — TELEPHONE (OUTPATIENT)
Dept: HEMATOLOGY/ONCOLOGY | Facility: CLINIC | Age: 55
End: 2020-10-23

## 2020-10-23 NOTE — TELEPHONE ENCOUNTER
Called patient to rescheduled the missed appointments. Left voice mail to call the office.  Number was provided.        ----- Message from Domitila Perry sent at 10/22/2020  9:06 AM CDT -----  Regarding: 10/22-called pt to reschedule scans and follow up she missed.  Alis Desai RN  [facility]  Pool         Does someone mind giving her a call to see if we can r/s her scans and follow up? She cancelled. Thank you!!

## 2020-11-04 ENCOUNTER — TELEPHONE (OUTPATIENT)
Dept: HEMATOLOGY/ONCOLOGY | Facility: CLINIC | Age: 55
End: 2020-11-04

## 2020-11-04 NOTE — TELEPHONE ENCOUNTER
----- Message from Domitila Perry sent at 10/22/2020  9:06 AM CDT -----  Regarding: 10/22-called pt to reschedule scans and follow up she missed.  Alis Desai RN  P [facility]  [facility]         Does someone mind giving her a call to see if we can r/s her scans and follow up? She cancelled. Thank you!!

## 2020-12-14 DIAGNOSIS — T50.2X5A DIURETIC-INDUCED HYPOKALEMIA: ICD-10-CM

## 2020-12-14 DIAGNOSIS — I10 ESSENTIAL HYPERTENSION: ICD-10-CM

## 2020-12-14 DIAGNOSIS — E87.6 DIURETIC-INDUCED HYPOKALEMIA: ICD-10-CM

## 2020-12-14 DIAGNOSIS — Z86.73 HISTORY OF STROKE: ICD-10-CM

## 2020-12-15 RX ORDER — CARVEDILOL 25 MG/1
25 TABLET ORAL 2 TIMES DAILY WITH MEALS
Qty: 180 TABLET | Refills: 0 | Status: SHIPPED | OUTPATIENT
Start: 2020-12-15 | End: 2021-04-05 | Stop reason: SDUPTHER

## 2020-12-15 RX ORDER — TRIAMTERENE AND HYDROCHLOROTHIAZIDE 37.5; 25 MG/1; MG/1
1 CAPSULE ORAL DAILY
Qty: 30 CAPSULE | Refills: 0 | Status: SHIPPED | OUTPATIENT
Start: 2020-12-15 | End: 2021-02-18

## 2020-12-15 RX ORDER — POTASSIUM CHLORIDE 750 MG/1
20 TABLET, EXTENDED RELEASE ORAL 2 TIMES DAILY
Qty: 120 TABLET | Refills: 0 | Status: SHIPPED | OUTPATIENT
Start: 2020-12-15 | End: 2021-04-05 | Stop reason: SDUPTHER

## 2020-12-15 RX ORDER — ATORVASTATIN CALCIUM 20 MG/1
20 TABLET, FILM COATED ORAL DAILY
Qty: 90 TABLET | Refills: 0 | Status: SHIPPED | OUTPATIENT
Start: 2020-12-15 | End: 2021-04-05 | Stop reason: SDUPTHER

## 2020-12-15 RX ORDER — NIFEDIPINE 90 MG/1
90 TABLET, EXTENDED RELEASE ORAL DAILY
Qty: 90 TABLET | Refills: 0 | Status: SHIPPED | OUTPATIENT
Start: 2020-12-15 | End: 2021-04-05 | Stop reason: SDUPTHER

## 2021-04-05 ENCOUNTER — HOSPITAL ENCOUNTER (OUTPATIENT)
Dept: RADIOLOGY | Facility: HOSPITAL | Age: 56
Discharge: HOME | End: 2021-04-05
Attending: INTERNAL MEDICINE
Payer: COMMERCIAL

## 2021-04-05 ENCOUNTER — OFFICE VISIT (OUTPATIENT)
Dept: FAMILY MEDICINE | Facility: CLINIC | Age: 56
End: 2021-04-05
Payer: COMMERCIAL

## 2021-04-05 ENCOUNTER — TELEPHONE (OUTPATIENT)
Dept: ADMINISTRATIVE | Facility: OTHER | Age: 56
End: 2021-04-05

## 2021-04-05 VITALS
WEIGHT: 248.69 LBS | DIASTOLIC BLOOD PRESSURE: 80 MMHG | BODY MASS INDEX: 34.81 KG/M2 | HEART RATE: 68 BPM | HEIGHT: 71 IN | TEMPERATURE: 98 F | OXYGEN SATURATION: 98 % | SYSTOLIC BLOOD PRESSURE: 120 MMHG

## 2021-04-05 DIAGNOSIS — M25.562 ACUTE PAIN OF LEFT KNEE: ICD-10-CM

## 2021-04-05 DIAGNOSIS — Z86.73 HISTORY OF STROKE: ICD-10-CM

## 2021-04-05 DIAGNOSIS — R21 RASH/SKIN ERUPTION: ICD-10-CM

## 2021-04-05 DIAGNOSIS — R73.03 PRE-DIABETES: ICD-10-CM

## 2021-04-05 DIAGNOSIS — E87.6 DIURETIC-INDUCED HYPOKALEMIA: ICD-10-CM

## 2021-04-05 DIAGNOSIS — Z12.11 COLON CANCER SCREENING: ICD-10-CM

## 2021-04-05 DIAGNOSIS — T50.2X5A DIURETIC-INDUCED HYPOKALEMIA: ICD-10-CM

## 2021-04-05 DIAGNOSIS — E78.5 HYPERLIPIDEMIA, UNSPECIFIED HYPERLIPIDEMIA TYPE: ICD-10-CM

## 2021-04-05 DIAGNOSIS — Z86.73 HISTORY OF ISCHEMIC STROKE: ICD-10-CM

## 2021-04-05 DIAGNOSIS — I10 ESSENTIAL HYPERTENSION: Primary | ICD-10-CM

## 2021-04-05 DIAGNOSIS — Z12.4 CERVICAL CANCER SCREENING: ICD-10-CM

## 2021-04-05 PROCEDURE — 3008F PR BODY MASS INDEX (BMI) DOCUMENTED: ICD-10-PCS | Mod: CPTII,S$GLB,, | Performed by: INTERNAL MEDICINE

## 2021-04-05 PROCEDURE — 96372 PR INJECTION,THERAP/PROPH/DIAG2ST, IM OR SUBCUT: ICD-10-PCS | Mod: S$GLB,,, | Performed by: INTERNAL MEDICINE

## 2021-04-05 PROCEDURE — 3074F PR MOST RECENT SYSTOLIC BLOOD PRESSURE < 130 MM HG: ICD-10-PCS | Mod: CPTII,S$GLB,, | Performed by: INTERNAL MEDICINE

## 2021-04-05 PROCEDURE — 73560 X-RAY EXAM OF KNEE 1 OR 2: CPT | Mod: 26,LT,, | Performed by: SPECIALIST

## 2021-04-05 PROCEDURE — 96372 THER/PROPH/DIAG INJ SC/IM: CPT | Mod: S$GLB,,, | Performed by: INTERNAL MEDICINE

## 2021-04-05 PROCEDURE — 1126F AMNT PAIN NOTED NONE PRSNT: CPT | Mod: S$GLB,,, | Performed by: INTERNAL MEDICINE

## 2021-04-05 PROCEDURE — 3008F BODY MASS INDEX DOCD: CPT | Mod: CPTII,S$GLB,, | Performed by: INTERNAL MEDICINE

## 2021-04-05 PROCEDURE — 3079F DIAST BP 80-89 MM HG: CPT | Mod: CPTII,S$GLB,, | Performed by: INTERNAL MEDICINE

## 2021-04-05 PROCEDURE — 99214 PR OFFICE/OUTPT VISIT, EST, LEVL IV, 30-39 MIN: ICD-10-PCS | Mod: 25,S$GLB,, | Performed by: INTERNAL MEDICINE

## 2021-04-05 PROCEDURE — 99999 PR PBB SHADOW E&M-EST. PATIENT-LVL V: CPT | Mod: PBBFAC,,, | Performed by: INTERNAL MEDICINE

## 2021-04-05 PROCEDURE — 73560 XR KNEE 1 OR 2 VIEW LEFT: ICD-10-PCS | Mod: 26,LT,, | Performed by: SPECIALIST

## 2021-04-05 PROCEDURE — 3074F SYST BP LT 130 MM HG: CPT | Mod: CPTII,S$GLB,, | Performed by: INTERNAL MEDICINE

## 2021-04-05 PROCEDURE — 73560 X-RAY EXAM OF KNEE 1 OR 2: CPT | Mod: TC,FY,LT

## 2021-04-05 PROCEDURE — 1126F PR PAIN SEVERITY QUANTIFIED, NO PAIN PRESENT: ICD-10-PCS | Mod: S$GLB,,, | Performed by: INTERNAL MEDICINE

## 2021-04-05 PROCEDURE — 3079F PR MOST RECENT DIASTOLIC BLOOD PRESSURE 80-89 MM HG: ICD-10-PCS | Mod: CPTII,S$GLB,, | Performed by: INTERNAL MEDICINE

## 2021-04-05 PROCEDURE — 99999 PR PBB SHADOW E&M-EST. PATIENT-LVL V: ICD-10-PCS | Mod: PBBFAC,,, | Performed by: INTERNAL MEDICINE

## 2021-04-05 PROCEDURE — 99214 OFFICE O/P EST MOD 30 MIN: CPT | Mod: 25,S$GLB,, | Performed by: INTERNAL MEDICINE

## 2021-04-05 RX ORDER — ASPIRIN 325 MG
325 TABLET, DELAYED RELEASE (ENTERIC COATED) ORAL DAILY
Refills: 0 | COMMUNITY
Start: 2021-04-05 | End: 2023-09-01

## 2021-04-05 RX ORDER — KETOROLAC TROMETHAMINE 30 MG/ML
15 INJECTION, SOLUTION INTRAMUSCULAR; INTRAVENOUS
Status: COMPLETED | OUTPATIENT
Start: 2021-04-05 | End: 2021-04-05

## 2021-04-05 RX ORDER — CARVEDILOL 25 MG/1
25 TABLET ORAL 2 TIMES DAILY WITH MEALS
Qty: 180 TABLET | Refills: 0 | Status: SHIPPED | OUTPATIENT
Start: 2021-04-05 | End: 2022-03-30

## 2021-04-05 RX ORDER — NIFEDIPINE 90 MG/1
90 TABLET, EXTENDED RELEASE ORAL DAILY
Qty: 90 TABLET | Refills: 0 | Status: SHIPPED | OUTPATIENT
Start: 2021-04-05 | End: 2021-07-26 | Stop reason: SDUPTHER

## 2021-04-05 RX ORDER — KETOROLAC TROMETHAMINE 30 MG/ML
15 INJECTION, SOLUTION INTRAMUSCULAR; INTRAVENOUS
Status: DISCONTINUED | OUTPATIENT
Start: 2021-04-05 | End: 2021-04-05

## 2021-04-05 RX ORDER — NAPROXEN 500 MG/1
500 TABLET ORAL 2 TIMES DAILY PRN
Qty: 60 TABLET | Refills: 1 | OUTPATIENT
Start: 2021-04-05 | End: 2022-03-04

## 2021-04-05 RX ORDER — TRIAMTERENE AND HYDROCHLOROTHIAZIDE 37.5; 25 MG/1; MG/1
1 CAPSULE ORAL DAILY
Qty: 90 CAPSULE | Refills: 0 | Status: SHIPPED | OUTPATIENT
Start: 2021-04-05 | End: 2021-07-26 | Stop reason: SDUPTHER

## 2021-04-05 RX ORDER — ATORVASTATIN CALCIUM 20 MG/1
20 TABLET, FILM COATED ORAL DAILY
Qty: 90 TABLET | Refills: 0 | Status: SHIPPED | OUTPATIENT
Start: 2021-04-05 | End: 2021-07-26 | Stop reason: SDUPTHER

## 2021-04-05 RX ORDER — KETOROLAC TROMETHAMINE 30 MG/ML
15 INJECTION, SOLUTION INTRAMUSCULAR; INTRAVENOUS
Status: SHIPPED | OUTPATIENT
Start: 2021-04-05 | End: 2021-04-08

## 2021-04-05 RX ORDER — POTASSIUM CHLORIDE 750 MG/1
20 TABLET, EXTENDED RELEASE ORAL 2 TIMES DAILY
Qty: 120 TABLET | Refills: 0 | Status: SHIPPED | OUTPATIENT
Start: 2021-04-05 | End: 2021-07-26

## 2021-04-05 RX ADMIN — KETOROLAC TROMETHAMINE 15 MG: 30 INJECTION, SOLUTION INTRAMUSCULAR; INTRAVENOUS at 11:04

## 2021-04-06 ENCOUNTER — PATIENT MESSAGE (OUTPATIENT)
Dept: FAMILY MEDICINE | Facility: CLINIC | Age: 56
End: 2021-04-06

## 2021-04-06 ENCOUNTER — TELEPHONE (OUTPATIENT)
Dept: FAMILY MEDICINE | Facility: CLINIC | Age: 56
End: 2021-04-06

## 2021-04-09 ENCOUNTER — IMMUNIZATION (OUTPATIENT)
Dept: FAMILY MEDICINE | Facility: CLINIC | Age: 56
End: 2021-04-09
Payer: COMMERCIAL

## 2021-04-09 DIAGNOSIS — Z23 NEED FOR VACCINATION: Primary | ICD-10-CM

## 2021-04-09 PROCEDURE — 91300 COVID-19, MRNA, LNP-S, PF, 30 MCG/0.3 ML DOSE VACCINE: CPT | Mod: PBBFAC | Performed by: FAMILY MEDICINE

## 2021-04-30 ENCOUNTER — IMMUNIZATION (OUTPATIENT)
Dept: FAMILY MEDICINE | Facility: CLINIC | Age: 56
End: 2021-04-30
Payer: COMMERCIAL

## 2021-04-30 DIAGNOSIS — Z23 NEED FOR VACCINATION: Primary | ICD-10-CM

## 2021-04-30 PROCEDURE — 91300 COVID-19, MRNA, LNP-S, PF, 30 MCG/0.3 ML DOSE VACCINE: ICD-10-PCS | Mod: S$GLB,,, | Performed by: FAMILY MEDICINE

## 2021-04-30 PROCEDURE — 91300 COVID-19, MRNA, LNP-S, PF, 30 MCG/0.3 ML DOSE VACCINE: CPT | Mod: S$GLB,,, | Performed by: FAMILY MEDICINE

## 2021-04-30 PROCEDURE — 0002A COVID-19, MRNA, LNP-S, PF, 30 MCG/0.3 ML DOSE VACCINE: CPT | Mod: CV19,S$GLB,, | Performed by: FAMILY MEDICINE

## 2021-04-30 PROCEDURE — 0002A COVID-19, MRNA, LNP-S, PF, 30 MCG/0.3 ML DOSE VACCINE: ICD-10-PCS | Mod: CV19,S$GLB,, | Performed by: FAMILY MEDICINE

## 2021-07-26 DIAGNOSIS — Z86.73 HISTORY OF STROKE: ICD-10-CM

## 2021-07-26 DIAGNOSIS — I10 ESSENTIAL HYPERTENSION: ICD-10-CM

## 2021-07-26 RX ORDER — ATORVASTATIN CALCIUM 20 MG/1
20 TABLET, FILM COATED ORAL DAILY
Qty: 90 TABLET | Refills: 0 | Status: SHIPPED | OUTPATIENT
Start: 2021-07-26 | End: 2021-11-30

## 2021-07-26 RX ORDER — TRIAMTERENE AND HYDROCHLOROTHIAZIDE 37.5; 25 MG/1; MG/1
1 CAPSULE ORAL DAILY
Qty: 90 CAPSULE | Refills: 0 | Status: SHIPPED | OUTPATIENT
Start: 2021-07-26 | End: 2022-01-16 | Stop reason: SDUPTHER

## 2021-07-26 RX ORDER — NIFEDIPINE 90 MG/1
90 TABLET, EXTENDED RELEASE ORAL DAILY
Qty: 90 TABLET | Refills: 0 | Status: SHIPPED | OUTPATIENT
Start: 2021-07-26 | End: 2022-01-16 | Stop reason: SDUPTHER

## 2021-07-28 ENCOUNTER — PATIENT MESSAGE (OUTPATIENT)
Dept: FAMILY MEDICINE | Facility: CLINIC | Age: 56
End: 2021-07-28

## 2021-11-30 DIAGNOSIS — Z86.73 HISTORY OF STROKE: ICD-10-CM

## 2021-11-30 RX ORDER — ATORVASTATIN CALCIUM 20 MG/1
TABLET, FILM COATED ORAL
Qty: 30 TABLET | Refills: 1 | Status: SHIPPED | OUTPATIENT
Start: 2021-11-30 | End: 2022-01-16 | Stop reason: SDUPTHER

## 2021-12-01 DIAGNOSIS — Z12.31 OTHER SCREENING MAMMOGRAM: ICD-10-CM

## 2022-01-10 ENCOUNTER — PATIENT MESSAGE (OUTPATIENT)
Dept: ADMINISTRATIVE | Facility: HOSPITAL | Age: 57
End: 2022-01-10
Payer: COMMERCIAL

## 2022-01-13 ENCOUNTER — PATIENT MESSAGE (OUTPATIENT)
Dept: FAMILY MEDICINE | Facility: CLINIC | Age: 57
End: 2022-01-13
Payer: COMMERCIAL

## 2022-01-13 DIAGNOSIS — Z12.11 COLON CANCER SCREENING: Primary | ICD-10-CM

## 2022-01-16 ENCOUNTER — PATIENT MESSAGE (OUTPATIENT)
Dept: FAMILY MEDICINE | Facility: CLINIC | Age: 57
End: 2022-01-16
Payer: COMMERCIAL

## 2022-01-16 DIAGNOSIS — E87.6 DIURETIC-INDUCED HYPOKALEMIA: ICD-10-CM

## 2022-01-16 DIAGNOSIS — T50.2X5A DIURETIC-INDUCED HYPOKALEMIA: ICD-10-CM

## 2022-01-16 DIAGNOSIS — Z86.73 HISTORY OF STROKE: ICD-10-CM

## 2022-01-16 DIAGNOSIS — I10 ESSENTIAL HYPERTENSION: ICD-10-CM

## 2022-01-16 NOTE — TELEPHONE ENCOUNTER
No new care gaps identified.  Powered by [employer]. Reference number: [id number].   1/16/2022 5:34:43 PM CST

## 2022-01-17 RX ORDER — TRIAMTERENE AND HYDROCHLOROTHIAZIDE 37.5; 25 MG/1; MG/1
1 CAPSULE ORAL DAILY
Qty: 90 CAPSULE | Refills: 0 | Status: SHIPPED | OUTPATIENT
Start: 2022-01-17 | End: 2022-06-20 | Stop reason: SDUPTHER

## 2022-01-17 RX ORDER — NIFEDIPINE 90 MG/1
90 TABLET, EXTENDED RELEASE ORAL DAILY
Qty: 90 TABLET | Refills: 0 | Status: SHIPPED | OUTPATIENT
Start: 2022-01-17 | End: 2022-06-20 | Stop reason: SDUPTHER

## 2022-01-17 RX ORDER — POTASSIUM CHLORIDE 750 MG/1
20 TABLET, EXTENDED RELEASE ORAL 2 TIMES DAILY
Qty: 120 TABLET | Refills: 0 | Status: SHIPPED | OUTPATIENT
Start: 2022-01-17 | End: 2022-04-28

## 2022-01-17 RX ORDER — ATORVASTATIN CALCIUM 20 MG/1
20 TABLET, FILM COATED ORAL DAILY
Qty: 30 TABLET | Refills: 0 | Status: SHIPPED | OUTPATIENT
Start: 2022-01-17 | End: 2022-03-09

## 2022-03-04 ENCOUNTER — HOSPITAL ENCOUNTER (EMERGENCY)
Facility: HOSPITAL | Age: 57
Discharge: HOME | End: 2022-03-04
Attending: EMERGENCY MEDICINE
Payer: COMMERCIAL

## 2022-03-04 VITALS
HEART RATE: 60 BPM | WEIGHT: 200 LBS | BODY MASS INDEX: 27.89 KG/M2 | OXYGEN SATURATION: 99 % | DIASTOLIC BLOOD PRESSURE: 73 MMHG | TEMPERATURE: 98 F | SYSTOLIC BLOOD PRESSURE: 162 MMHG | RESPIRATION RATE: 15 BRPM

## 2022-03-04 DIAGNOSIS — M79.645 PAIN OF LEFT THUMB: Primary | ICD-10-CM

## 2022-03-04 LAB
ANION GAP SERPL CALC-SCNC: 7 MMOL/L (ref 8–16)
BASOPHILS # BLD AUTO: 0.02 K/UL (ref 0–0.2)
BASOPHILS NFR BLD: 0.4 % (ref 0–1.9)
CALCIUM SERPL-MCNC: 9.1 MG/DL (ref 8.7–10.5)
CHLORIDE SERPL-SCNC: 104 MMOL/L (ref 95–110)
CO2 SERPL-SCNC: 31 MMOL/L (ref 23–29)
CREAT SERPL-MCNC: 0.74 MG/DL (ref 0.5–1.4)
DIFFERENTIAL METHOD: ABNORMAL
EOSINOPHIL # BLD AUTO: 0.1 K/UL (ref 0–0.5)
EOSINOPHIL NFR BLD: 1.3 % (ref 0–8)
ERYTHROCYTE [DISTWIDTH] IN BLOOD BY AUTOMATED COUNT: 15.9 % (ref 11.5–14.5)
EST. GFR  (AFRICAN AMERICAN): >60 ML/MIN/1.73 M^2
EST. GFR  (NON AFRICAN AMERICAN): >60 ML/MIN/1.73 M^2
GLUCOSE SERPL-MCNC: 107 MG/DL (ref 70–110)
HCT VFR BLD AUTO: 35.3 % (ref 37–48.5)
HGB BLD-MCNC: 11.1 G/DL (ref 12–16)
IMM GRANULOCYTES # BLD AUTO: 0.01 K/UL (ref 0–0.04)
IMM GRANULOCYTES NFR BLD AUTO: 0.2 % (ref 0–0.5)
LYMPHOCYTES # BLD AUTO: 1.7 K/UL (ref 1–4.8)
LYMPHOCYTES NFR BLD: 30.9 % (ref 18–48)
MCH RBC QN AUTO: 24.1 PG (ref 27–31)
MCHC RBC AUTO-ENTMCNC: 31.4 G/DL (ref 32–36)
MCV RBC AUTO: 77 FL (ref 82–98)
MONOCYTES # BLD AUTO: 0.4 K/UL (ref 0.3–1)
MONOCYTES NFR BLD: 6.8 % (ref 4–15)
NEUTROPHILS # BLD AUTO: 3.4 K/UL (ref 1.8–7.7)
NEUTROPHILS NFR BLD: 60.4 % (ref 38–73)
NRBC BLD-RTO: 0 /100 WBC
PLATELET # BLD AUTO: 239 K/UL (ref 150–450)
PMV BLD AUTO: 11 FL (ref 9.2–12.9)
POTASSIUM SERPL-SCNC: 3.1 MMOL/L (ref 3.5–5.1)
RBC # BLD AUTO: 4.61 M/UL (ref 4–5.4)
SODIUM SERPL-SCNC: 142 MMOL/L (ref 136–145)
URATE SERPL-MCNC: 5.4 MG/DL (ref 2.4–5.7)
UUN UR-MCNC: 11 MG/DL (ref 7–17)
WBC # BLD AUTO: 5.56 K/UL (ref 3.9–12.7)

## 2022-03-04 PROCEDURE — 85025 COMPLETE CBC W/AUTO DIFF WBC: CPT | Mod: ER | Performed by: EMERGENCY MEDICINE

## 2022-03-04 PROCEDURE — 84550 ASSAY OF BLOOD/URIC ACID: CPT | Performed by: EMERGENCY MEDICINE

## 2022-03-04 PROCEDURE — 80048 BASIC METABOLIC PNL TOTAL CA: CPT | Mod: ER | Performed by: EMERGENCY MEDICINE

## 2022-03-04 PROCEDURE — 99284 EMERGENCY DEPT VISIT MOD MDM: CPT | Mod: 25,ER

## 2022-03-04 RX ORDER — NAPROXEN 500 MG/1
500 TABLET ORAL 2 TIMES DAILY WITH MEALS
Qty: 60 TABLET | Refills: 0 | Status: SHIPPED | OUTPATIENT
Start: 2022-03-04 | End: 2023-05-23

## 2022-03-04 NOTE — ED NOTES
Patient reports pain/edema to proximal left thumb and hand x 1 week. She advises pain became worse 2 days ago. She denies trauma or injury. She is awake/alert and ambulatory with no acute distress noted.     APPEARANCE: Alert, oriented and in no acute distress.  HEENT: Speaks without hoarseness.  CARDIAC: Normal rate and rhythm.    PERIPHERAL VASCULAR: peripheral pulses present. Normal cap refill. No edema. Warm to touch.    RESPIRATORY:Normal rate and effort. Respirations are equal and unlabored no obvious signs of distress.  GASTRO: soft, nondistended, nontender. Denies nausea, vomiting, or diarrhea.  : voids spontaneously and without difficulty.   MUSC: Pain and edema to proximal left thumb and hand. Pain increases with palpation and movement. Remainder unremarkable.  Ambulatory with a steady gait.  SKIN: Skin is warm and dry, without discoloration. Mucous membranes moist.  NEURO: Pt is awake, alert, aware of environment. No neurologic deficits noted.

## 2022-03-04 NOTE — ED PROVIDER NOTES
Encounter Date: 3/4/2022       History     Chief Complaint   Patient presents with    Finger Pain     Left thumb pain with a burning sensation for a week now     HPI     Ms. Burgess is a 56-year-old female with about 1 week of left thumb pain at the base of the proximal phalanx.  She is a  but does not describe any specific repetitive injury type use of the hand, there has been no erythema or swelling there has been no proximal or distal swelling there has been no pain in the wrist for elbow or shoulder she has had no fever chills URI symptoms sore throat eye irritation urinary complaints chest pain cough shortness of breath.  She denies trauma.  She has no history of arthropathy or autoimmune disorder.    Review of patient's allergies indicates:   Allergen Reactions    Ace inhibitors Swelling    Benicar [olmesartan] Swelling    Methyldopa Other (See Comments)     sleepy    Clonidine Other (See Comments)     sleepy     Past Medical History:   Diagnosis Date    Anemia     Diverticulosis     History of gastroesophageal reflux (GERD)     Hyperlipidemia     Hypertension     Stroke 2015    Trauma 1998    Fractured Right ankle     Past Surgical History:   Procedure Laterality Date    BARTHOLIN GLAND CYST EXCISION      Marsupialization, not excision    HYSTERECTOMY  10/2006    Laparoscopic Supracervical Hysterectomy    TUBAL LIGATION       Family History   Problem Relation Age of Onset    Colon cancer Mother 55    Hypertension Mother     Diabetes Father     Hypertension Father     Hypertension Brother     Hypertension Sister     Heart disease Neg Hx     Heart attack Neg Hx     Breast cancer Neg Hx     Ovarian cancer Neg Hx      Social History     Tobacco Use    Smoking status: Never Smoker    Smokeless tobacco: Never Used   Substance Use Topics    Alcohol use: Not Currently    Drug use: No     Review of Systems    Constitutional: Negative for fever, chills, diaphoresis, activity  change and fatigue.  HENT: Negative for nosebleeds, congestion, sore throat, facial swelling, sneezing, drooling, mouth sores, trouble swallowing, neck pain, neck stiffness, voice change and sinus pressure.   Eyes: Negative for pain and redness.  Respiratory: Negative for cough, choking, chest tightness, shortness of breath, wheezing and stridor.   Cardiovascular: Negative for chest pain, palpitations and leg swelling.  Gastrointestinal: Negative for nausea, vomiting, abdominal pain, diarrhea, constipation, blood in stool and abdominal distention.  Genitourinary: Negative for dysuria, hematuria and flank pain.  Musculoskeletal: Negative for myalgias, joint swelling, arthralgias and gait problem.  Skin: Negative for pallor and rash.  Neurological: Negative for dizziness, numbness and headaches. No loss of function.  Hematological: Negative for adenopathy.      Physical Exam     Initial Vitals [03/04/22 1431]   BP Pulse Resp Temp SpO2   (!) 187/84 64 15 98.3 °F (36.8 °C) 98 %      MAP       --         Physical Exam    Nursing note and vitals reviewed.    Constitutional: Awake alert and comfortable in appearance. Appears well-developed and well-nourished. No distress. Non-toxic and well appearing.  Head: Normocephalic and atraumatic.  Nose: Nose normal.  Mouth/Throat: Oropharynx is clear and moist. No oropharyngeal exudate.  Eyes: Conjunctivae normal and EOM are normal. Pupils are equal, round, and reactive to light. No scleral icterus.  Neck: Normal range of motion. Neck supple. No JVD present.  No point tenderness or bony deformity.  Cardiovascular: Normal rate, regular rhythm and normal heart sounds. No murmur heard.  Pulmonary/Chest: Effort normal and breath sounds normal. No respiratory distress. No wheezes. No rales. Exhibits no tenderness.  Abdominal: Soft. Bowel sounds are normal. Exhibits no distension and no mass. There is no tenderness. There is no rebound and no guarding.  Musculoskeletal:  The thumb is  normal in appearance and symmetric there is no joint swelling erythema there is no hand swelling or erythema there is no joint instability.  She is tender to palpation at the base of the phalanx/MCP or there is no crepitus.  There is no tenderness to percussion of the carpal tunnel.  She is neurovascularly and functionally intact throughout.  The remainder of the musculoskeletal exam is unremarkable..  Lymphadenopathy: No cervical adenopathy.  Neurological: Alert and oriented to person, place, and time. No cranial nerve deficit. Normal strength sensation and reflexes throughout.  Skin: Skin is warm and dry. No rash noted.      ED Course   Procedures  Labs Reviewed   CBC W/ AUTO DIFFERENTIAL - Abnormal; Notable for the following components:       Result Value    Hemoglobin 11.1 (*)     Hematocrit 35.3 (*)     MCV 77 (*)     MCH 24.1 (*)     MCHC 31.4 (*)     RDW 15.9 (*)     All other components within normal limits   BASIC METABOLIC PANEL - Abnormal; Notable for the following components:    Potassium 3.1 (*)     CO2 31 (*)     Anion Gap 7 (*)     All other components within normal limits   URIC ACID          Imaging Results          X-Ray Hand 3 View Left (Final result)  Result time 03/04/22 14:57:27    Final result by Quintin Chicas MD (03/04/22 14:57:27)                 Impression:      No abnormality identified.      Electronically signed by: Quintin Chicas  Date:    03/04/2022  Time:    14:57             Narrative:    EXAMINATION:  XR HAND COMPLETE 3 VIEW LEFT    CLINICAL HISTORY:  pain;.    TECHNIQUE:  PA, lateral, and oblique views of the left hand were performed.    COMPARISON:  None    FINDINGS:  No evidence of fracture or dislocation.  Soft tissues unremarkable.  No evidence of radiopaque or radiolucent foreign body.  Joint spaces appear well maintained.                                 Medications - No data to display                       Clinical Impression:   Final diagnoses:  [M79.645] Pain of left thumb  (Primary)       History exam and workup are consistent with repetitive use injury, there is no indication of acute infectious or inflammatory arthritis there is no indication of acute bony or ligamentous injury there is no indication of acute cellulitis.  Appears appropriate for outpatient management with splinting and set for comfort.  Discussed findings and close follow-up with PMD.  Discussed reasons for return to the emergency department.       ED Disposition Condition    Discharge Stable        ED Prescriptions     Medication Sig Dispense Start Date End Date Auth. Provider    naproxen (NAPROSYN) 500 MG tablet Take 1 tablet (500 mg total) by mouth 2 (two) times daily with meals. 60 tablet 3/4/2022  Ramon Díaz MD        Follow-up Information     Follow up With Specialties Details Why Contact Info    Anna Dos Santos MD Family Medicine Go in 3 days For follow-up and orthopedic referral as we discussed [address]  [place] [address]  [phone]             Ramon Díaz MD  03/04/22 2357

## 2022-03-08 DIAGNOSIS — Z86.73 HISTORY OF STROKE: ICD-10-CM

## 2022-03-08 NOTE — TELEPHONE ENCOUNTER
Care Due:                  Date            Visit Type   Department     Provider  --------------------------------------------------------------------------------                                MYCHART                              FOLLOWUP/OF  [facility]  Last Visit: 04-      FICE VISIT   MEDICINE       Anna Dos Santos  Next Visit: None Scheduled  None         None Found                                                            Last  Test          Frequency    Reason                     Performed    Due Date  --------------------------------------------------------------------------------    CMP.........  12 months..  atorvastatin.............  09- 09-    Lipid Panel.  12 months..  atorvastatin.............  09-   09-    Powered by [employer]. Reference number: [id number].   3/08/2022 4:51:32 PM CST

## 2022-03-09 RX ORDER — ATORVASTATIN CALCIUM 20 MG/1
TABLET, FILM COATED ORAL
Qty: 30 TABLET | Refills: 0 | Status: SHIPPED | OUTPATIENT
Start: 2022-03-09 | End: 2022-06-20 | Stop reason: SDUPTHER

## 2022-03-29 DIAGNOSIS — I10 ESSENTIAL HYPERTENSION: ICD-10-CM

## 2022-03-29 NOTE — TELEPHONE ENCOUNTER
Care Due:                  Date            Visit Type   Department     Provider  --------------------------------------------------------------------------------                                MYCHART                              FOLLOWUP/OF  [facility]  Last Visit: 04-      FICE VISIT   MEDICINE       Anna Dos Santos  Next Visit: None Scheduled  None         None Found                                                            Last  Test          Frequency    Reason                     Performed    Due Date  --------------------------------------------------------------------------------    Office Visit  12 months..  NIFEdipine, atorvastatin,   04- 03-                             carvediloL, potassium,                             triamterene-hydrochloroth                             iazide...................    Powered by [employer] by [employer]. Reference number: [id number].   3/29/2022 6:13:06 PM CDT

## 2022-03-30 RX ORDER — CARVEDILOL 25 MG/1
TABLET ORAL
Qty: 60 TABLET | Refills: 0 | Status: SHIPPED | OUTPATIENT
Start: 2022-03-30 | End: 2022-06-20 | Stop reason: SDUPTHER

## 2022-03-30 NOTE — TELEPHONE ENCOUNTER
This Rx Request does not qualify for assessment with the OR   Please review protocol details and the Care Due Message for extra clinical information    Reasons Rx Request may be deferred:  Patient has been seen in the ED/Hospital since the last PCP visit  Pt due for OV with PCP    Note composed:9:37 PM 03/29/2022

## 2022-04-19 ENCOUNTER — TELEPHONE (OUTPATIENT)
Dept: ADMINISTRATIVE | Facility: HOSPITAL | Age: 57
End: 2022-04-19
Payer: COMMERCIAL

## 2022-04-19 ENCOUNTER — PATIENT OUTREACH (OUTPATIENT)
Dept: ADMINISTRATIVE | Facility: HOSPITAL | Age: 57
End: 2022-04-19
Payer: COMMERCIAL

## 2022-04-19 VITALS — DIASTOLIC BLOOD PRESSURE: 80 MMHG | SYSTOLIC BLOOD PRESSURE: 140 MMHG

## 2022-04-28 DIAGNOSIS — E87.6 DIURETIC-INDUCED HYPOKALEMIA: ICD-10-CM

## 2022-04-28 DIAGNOSIS — T50.2X5A DIURETIC-INDUCED HYPOKALEMIA: ICD-10-CM

## 2022-04-28 NOTE — TELEPHONE ENCOUNTER
Refill Routing Note   Medication(s) are not appropriate for processing by [facility] for the following reason(s):      - Patient has been seen in the ED/Hospital since the last PCP visit    [facility] action(s):  Defer          Medication reconciliation completed: No     Appointments  past 12m or future 3m with PCP    Date Provider   Last Visit   4/5/2021 Anna Dos Santos MD   Next Visit   Visit date not found Anna Dos Santos MD   ED visits in past 90 days: 1        Note composed:6:37 PM 04/28/2022

## 2022-04-29 RX ORDER — POTASSIUM CHLORIDE 750 MG/1
TABLET, EXTENDED RELEASE ORAL
Qty: 360 TABLET | Refills: 0 | Status: SHIPPED | OUTPATIENT
Start: 2022-04-29 | End: 2023-01-16 | Stop reason: SDUPTHER

## 2022-05-03 ENCOUNTER — TELEPHONE (OUTPATIENT)
Dept: FAMILY MEDICINE | Facility: CLINIC | Age: 57
End: 2022-05-03
Payer: COMMERCIAL

## 2022-05-03 DIAGNOSIS — Z12.11 COLON CANCER SCREENING: Primary | ICD-10-CM

## 2022-05-06 DIAGNOSIS — T50.2X5A DIURETIC-INDUCED HYPOKALEMIA: ICD-10-CM

## 2022-05-06 DIAGNOSIS — E87.6 DIURETIC-INDUCED HYPOKALEMIA: ICD-10-CM

## 2022-05-31 ENCOUNTER — PATIENT MESSAGE (OUTPATIENT)
Dept: ADMINISTRATIVE | Facility: HOSPITAL | Age: 57
End: 2022-05-31
Payer: COMMERCIAL

## 2022-06-21 ENCOUNTER — PATIENT MESSAGE (OUTPATIENT)
Dept: ADMINISTRATIVE | Facility: HOSPITAL | Age: 57
End: 2022-06-21
Payer: COMMERCIAL

## 2022-06-21 ENCOUNTER — PATIENT MESSAGE (OUTPATIENT)
Dept: FAMILY MEDICINE | Facility: CLINIC | Age: 57
End: 2022-06-21
Payer: COMMERCIAL

## 2022-06-22 ENCOUNTER — PATIENT OUTREACH (OUTPATIENT)
Dept: ADMINISTRATIVE | Facility: HOSPITAL | Age: 57
End: 2022-06-22
Payer: COMMERCIAL

## 2022-07-18 ENCOUNTER — HOSPITAL ENCOUNTER (OUTPATIENT)
Dept: RADIOLOGY | Facility: HOSPITAL | Age: 57
Discharge: HOME | End: 2022-07-18
Attending: INTERNAL MEDICINE
Payer: COMMERCIAL

## 2022-07-18 DIAGNOSIS — Z12.31 OTHER SCREENING MAMMOGRAM: ICD-10-CM

## 2022-07-18 PROCEDURE — 77067 SCR MAMMO BI INCL CAD: CPT | Mod: TC

## 2022-07-18 PROCEDURE — 77063 MAMMO DIGITAL SCREENING BILAT WITH TOMO: ICD-10-PCS | Mod: 26,,, | Performed by: RADIOLOGY

## 2022-07-18 PROCEDURE — 77067 SCR MAMMO BI INCL CAD: CPT | Mod: 26,,, | Performed by: RADIOLOGY

## 2022-07-18 PROCEDURE — 77063 BREAST TOMOSYNTHESIS BI: CPT | Mod: TC

## 2022-07-18 PROCEDURE — 77067 MAMMO DIGITAL SCREENING BILAT WITH TOMO: ICD-10-PCS | Mod: 26,,, | Performed by: RADIOLOGY

## 2022-07-18 PROCEDURE — 77063 BREAST TOMOSYNTHESIS BI: CPT | Mod: 26,,, | Performed by: RADIOLOGY

## 2022-08-14 ENCOUNTER — HOSPITAL ENCOUNTER (EMERGENCY)
Facility: HOSPITAL | Age: 57
Discharge: HOME | End: 2022-08-14
Attending: EMERGENCY MEDICINE
Payer: COMMERCIAL

## 2022-08-14 VITALS
TEMPERATURE: 98 F | SYSTOLIC BLOOD PRESSURE: 176 MMHG | DIASTOLIC BLOOD PRESSURE: 83 MMHG | HEART RATE: 44 BPM | OXYGEN SATURATION: 100 % | RESPIRATION RATE: 20 BRPM

## 2022-08-14 DIAGNOSIS — M17.11 OSTEOARTHRITIS OF RIGHT KNEE, UNSPECIFIED OSTEOARTHRITIS TYPE: ICD-10-CM

## 2022-08-14 DIAGNOSIS — M71.21 SYNOVIAL CYST OF RIGHT POPLITEAL SPACE: Primary | ICD-10-CM

## 2022-08-14 DIAGNOSIS — M25.561 RIGHT KNEE PAIN: ICD-10-CM

## 2022-08-14 PROBLEM — M25.562 POSTERIOR KNEE PAIN, LEFT: Status: ACTIVE | Noted: 2022-08-14

## 2022-08-14 PROCEDURE — 25000003 PHARM REV CODE 250: Performed by: NURSE PRACTITIONER

## 2022-08-14 PROCEDURE — 99284 EMERGENCY DEPT VISIT MOD MDM: CPT | Mod: 25

## 2022-08-14 RX ORDER — MELOXICAM 7.5 MG/1
7.5 TABLET ORAL DAILY
Qty: 15 TABLET | Refills: 0 | Status: SHIPPED | OUTPATIENT
Start: 2022-08-14 | End: 2022-08-29

## 2022-08-14 RX ORDER — KETOROLAC TROMETHAMINE 10 MG/1
10 TABLET, FILM COATED ORAL
Status: COMPLETED | OUTPATIENT
Start: 2022-08-14 | End: 2022-08-14

## 2022-08-14 RX ADMIN — KETOROLAC TROMETHAMINE 10 MG: 10 TABLET, FILM COATED ORAL at 10:08

## 2022-08-14 NOTE — ED NOTES
Patient updated on plan of care, patient verbalizes understanding. Patient states pain remains at a 10/10 but states she would like to give the PO Toradol a little while longer before taking anything else for pain.

## 2022-08-14 NOTE — ED PROVIDER NOTES
Encounter Date: 8/14/2022       History     Chief Complaint   Patient presents with    Knee Pain     Right anterior knee pain, no trauma, +edema noted no discoloration. Pain radiates down leg described as a throbbing pain. + pedal pulse in right leg     57 y/o female which presents with posterior right knee pain that began this morning when she woke up. Pt denies injury or trauma. She states she works for [employer] and does a lot of walking.     The history is provided by the patient.     Review of patient's allergies indicates:   Allergen Reactions    Ace inhibitors Swelling    Benicar [olmesartan] Swelling    Methyldopa Other (See Comments)     sleepy    Clonidine Other (See Comments)     sleepy     Past Medical History:   Diagnosis Date    Anemia     Diverticulosis     History of gastroesophageal reflux (GERD)     Hyperlipidemia     Hypertension     Stroke 2015    Trauma 1998    Fractured Right ankle     Past Surgical History:   Procedure Laterality Date    BARTHOLIN GLAND CYST EXCISION      Marsupialization, not excision    HYSTERECTOMY  10/2006    Laparoscopic Supracervical Hysterectomy    TUBAL LIGATION       Family History   Problem Relation Age of Onset    Colon cancer Mother 55    Hypertension Mother     Diabetes Father     Hypertension Father     Hypertension Brother     Hypertension Sister     Heart disease Neg Hx     Heart attack Neg Hx     Breast cancer Neg Hx     Ovarian cancer Neg Hx      Social History     Tobacco Use    Smoking status: Never Smoker    Smokeless tobacco: Never Used   Substance Use Topics    Alcohol use: Not Currently    Drug use: No     Review of Systems   Constitutional: Negative for fever.   HENT: Negative for sore throat.    Respiratory: Negative for shortness of breath.    Cardiovascular: Negative for chest pain.   Gastrointestinal: Negative for nausea.   Genitourinary: Negative for dysuria.   Musculoskeletal: Positive for joint swelling and myalgias.  Negative for back pain.   Skin: Negative for rash.   Neurological: Negative for weakness.   Hematological: Does not bruise/bleed easily.   All other systems reviewed and are negative.    Physical Exam     Initial Vitals [08/14/22 0930]   BP Pulse Resp Temp SpO2   (!) 188/90 67 20 97.9 °F (36.6 °C) 100 %      MAP       --         Physical Exam    Nursing note and vitals reviewed.  Constitutional: She appears well-developed and well-nourished.   HENT:   Head: Normocephalic and atraumatic.   Eyes: Conjunctivae and EOM are normal. Pupils are equal, round, and reactive to light.   Neck:   Normal range of motion.  Cardiovascular: Normal rate, regular rhythm, normal heart sounds and intact distal pulses. Exam reveals no gallop and no friction rub.    No murmur heard.  Pulmonary/Chest: Breath sounds normal. No respiratory distress. She has no wheezes. She has no rhonchi. She has no rales. She exhibits no tenderness.   Musculoskeletal:         General: Tenderness and edema present.      Cervical back: Normal range of motion.     Neurological: She is alert and oriented to person, place, and time. She has normal strength. GCS score is 15. GCS eye subscore is 4. GCS verbal subscore is 5. GCS motor subscore is 6.   Skin: Skin is warm. Capillary refill takes less than 2 seconds.       ED Course   Procedures  Labs Reviewed - No data to display       Imaging Results          US Lower Extremity Veins Bilateral (Final result)  Result time 08/14/22 11:48:24    Final result by Maurizio Sevilla MD (08/14/22 11:48:24)                 Impression:      No evidence of deep venous thrombosis in either lower extremity.    Suggested right popliteal fossa Baker's cyst.      Electronically signed by: Maurizio Sevilla  Date:    08/14/2022  Time:    11:48             Narrative:    EXAMINATION:  US LOWER EXTREMITY VEINS BILATERAL    CLINICAL HISTORY:  Pain in left knee    TECHNIQUE:  Duplex and color flow Doppler and dynamic compression was  performed of the bilateral lower extremity veins was performed.    COMPARISON:  None    FINDINGS:  Right thigh veins: The common femoral, femoral, popliteal, upper greater saphenous, and deep femoral veins are patent and free of thrombus. The veins are normally compressible and have normal phasic flow and augmentation response.    Right calf veins: The visualized calf veins are patent.    Left thigh veins: The common femoral, femoral, popliteal, upper greater saphenous, and deep femoral veins are patent and free of thrombus. The veins are normally compressible and have normal phasic flow and augmentation response.    Left calf veins: The visualized calf veins are patent.    Miscellaneous: Anechoic, avascular right popliteal cystic lesion measuring 3.3 x 1.2 x 2.5 cm compatible popliteal fossa/Baker's cyst.                               X-Ray Knee 3 View Right (Final result)  Result time 08/14/22 10:24:49    Final result by Meenakshi Loco MD (08/14/22 10:24:49)                 Impression:      Degenerative change without fracture or dislocation.      Electronically signed by: Meenakshi Loco MD  Date:    08/14/2022  Time:    10:24             Narrative:    EXAMINATION:  XR KNEE 3 VIEW RIGHT    CLINICAL HISTORY:  Pain in right knee    TECHNIQUE:  AP, lateral, and fibular views of the right knee were performed.    COMPARISON:  None    FINDINGS:  Medial compartment joint space narrowing with subchondral sclerosis and small marginal osteophyte formation.  Minimal dorsal spur from the patella.  Small quadriceps enthesophyte.  No fracture or dislocation.  No joint effusion.                                 Medications   ketorolac tablet 10 mg (10 mg Oral Given 8/14/22 1014)     Medical Decision Making:   Initial Assessment:   57 y/o female which presents with right posterior knee pain that began this morning. Xray pending.  Differential Diagnosis:   DVT, Baker's cyst, osteoarthritis, knee sprain  Clinical Tests:    Radiological Study: Ordered and Reviewed  ED Management:  Pt examined and noted to have pain to the right posterior knee. Xray was negative. Toradol was given for pain and alleviated most of her pain. US showed a baker's cyst. Pt has been referred to ortho in the case her pain does not resolve. Patient given strict return precautions and voiced understanding of all discharge instructions. Pt was stable at discharge.                  ED Course as of 08/14/22 1552   Sun Aug 14, 2022   0952 BP(!): 188/90 [AT]   0952 Temp: 97.9 °F (36.6 °C) [AT]   0952 Temp src: Oral [AT]   0952 Pulse: 67 [AT]   0952 Resp: 20 [AT]   0952 SpO2: 100 % [AT]      ED Course User Index  [AT] MARK Lee             Clinical Impression:   Final diagnoses:  [M25.561] Right knee pain  [M71.21] Synovial cyst of right popliteal space (Primary)  [M17.11] Osteoarthritis of right knee, unspecified osteoarthritis type          ED Disposition Condition    Discharge Stable        ED Prescriptions     Medication Sig Dispense Start Date End Date Auth. Provider    meloxicam (MOBIC) 7.5 MG tablet Take 1 tablet (7.5 mg total) by mouth once daily. for 15 days 15 tablet 8/14/2022 8/29/2022 MARK Lee        Follow-up Information     Follow up With Specialties Details Why Contact Info Additional Information    [facility] Schedule an appointment as soon as possible for a visit in 1 week  [address], [facility] [address]  [facility] [address] [phone] Please park in [address] C or D and use Andie leos. Take Medical Office Bldg. elevators.           MARK Lee  08/14/22 1455

## 2022-08-14 NOTE — ED NOTES
Patient presents to ED from home stating she woke up with 10/10 throbbing right knee pain that radiates down to the bottom of her right heel. Patient states she does not remember hitting/injuring her knee. Pedal and dorsalis pulses palpable, no redness or swelling noted. Patient tearful.

## 2022-08-14 NOTE — ED NOTES
Discharge instructions and prescriptions reviewed with patient. Patient verbalizes understanding. Patient rates knee pain 5/10. Ace wrap applied to right knee.

## 2022-08-24 ENCOUNTER — OFFICE VISIT (OUTPATIENT)
Dept: ORTHOPEDICS | Facility: CLINIC | Age: 57
End: 2022-08-24
Payer: COMMERCIAL

## 2022-08-24 VITALS — BODY MASS INDEX: 28 KG/M2 | WEIGHT: 200 LBS | HEIGHT: 71 IN

## 2022-08-24 DIAGNOSIS — M71.21 SYNOVIAL CYST OF RIGHT POPLITEAL SPACE: ICD-10-CM

## 2022-08-24 DIAGNOSIS — M17.11 PRIMARY OSTEOARTHRITIS OF RIGHT KNEE: Primary | ICD-10-CM

## 2022-08-24 DIAGNOSIS — M25.561 ACUTE PAIN OF RIGHT KNEE: ICD-10-CM

## 2022-08-24 PROCEDURE — 99999 PR PBB SHADOW E&M-EST. PATIENT-LVL IV: CPT | Mod: PBBFAC,,, | Performed by: ORTHOPAEDIC SURGERY

## 2022-08-24 PROCEDURE — 1159F MED LIST DOCD IN RCRD: CPT | Mod: CPTII,S$GLB,, | Performed by: ORTHOPAEDIC SURGERY

## 2022-08-24 PROCEDURE — 1160F PR REVIEW ALL MEDS BY PRESCRIBER/CLIN PHARMACIST DOCUMENTED: ICD-10-PCS | Mod: CPTII,S$GLB,, | Performed by: ORTHOPAEDIC SURGERY

## 2022-08-24 PROCEDURE — 99213 PR OFFICE/OUTPT VISIT, EST, LEVL III, 20-29 MIN: ICD-10-PCS | Mod: S$GLB,,, | Performed by: ORTHOPAEDIC SURGERY

## 2022-08-24 PROCEDURE — 1159F PR MEDICATION LIST DOCUMENTED IN MEDICAL RECORD: ICD-10-PCS | Mod: CPTII,S$GLB,, | Performed by: ORTHOPAEDIC SURGERY

## 2022-08-24 PROCEDURE — 99213 OFFICE O/P EST LOW 20 MIN: CPT | Mod: S$GLB,,, | Performed by: ORTHOPAEDIC SURGERY

## 2022-08-24 PROCEDURE — 3008F PR BODY MASS INDEX (BMI) DOCUMENTED: ICD-10-PCS | Mod: CPTII,S$GLB,, | Performed by: ORTHOPAEDIC SURGERY

## 2022-08-24 PROCEDURE — 1160F RVW MEDS BY RX/DR IN RCRD: CPT | Mod: CPTII,S$GLB,, | Performed by: ORTHOPAEDIC SURGERY

## 2022-08-24 PROCEDURE — 99999 PR PBB SHADOW E&M-EST. PATIENT-LVL IV: ICD-10-PCS | Mod: PBBFAC,,, | Performed by: ORTHOPAEDIC SURGERY

## 2022-08-24 PROCEDURE — 3008F BODY MASS INDEX DOCD: CPT | Mod: CPTII,S$GLB,, | Performed by: ORTHOPAEDIC SURGERY

## 2022-08-24 NOTE — PROGRESS NOTES
Subjective:      Patient ID: Lori Burgess is a 56 y.o. female.    Chief Complaint: Pain of the Left Foot    HPI     Patient who works as a  presents to clinic with acute right knee pain x 3 weeks. Patient states the pain began gradually and is localized circumferential around the patella. She denies any BEST or traumatic event.  Pain became surgery great she went to the ER, and received a x-ray and ultrasound was told she had a Baker cyst.  She was given meloxicam 15 mg which she has taken daily.  She reports this has significantly decreased her pain from 10/10 down to tolerable levels today.She has attempted multiple conservative measures that include activity modification, ice & elevation, and oral medications (meloxicam), with relief. She denies any mechanical symptoms to include locking and catching or instability. Denies numbness, tingling, radiation, and inability to bear weight. She is here today to discuss treatment options.    No previous surgeries or trauma on right knee      Review of Systems   Constitutional: Negative.   HENT: Negative.    Eyes: Negative.    Cardiovascular: Negative.    Respiratory: Negative.    Endocrine: Negative.    Hematologic/Lymphatic: Negative.    Skin: Negative.    Musculoskeletal: Positive for arthritis, joint pain and stiffness. Negative for falls, joint swelling and muscle weakness.   Neurological: Negative.    Psychiatric/Behavioral: Negative.    Allergic/Immunologic: Negative.          Objective:            General    Nursing note and vitals reviewed.  Constitutional: She is oriented to person, place, and time. She appears well-developed and well-nourished. No distress.   HENT:   Head: Normocephalic and atraumatic.   Nose: Nose normal.   Eyes: EOM are normal.   Cardiovascular: Intact distal pulses.    Pulmonary/Chest: Effort normal. No respiratory distress.   Neurological: She is alert and oriented to person, place, and time.   Psychiatric: She has a normal  mood and affect. Her behavior is normal. Judgment and thought content normal.           Right Knee Exam     Inspection   Erythema: absent  Scars: absent  Swelling: absent  Effusion: absent  Deformity: absent  Bruising: absent    Tenderness   The patient is experiencing no tenderness.     Range of Motion   Extension: -5   Flexion: 110     Tests   Meniscus   Artie:  Medial - negative Lateral - negative  Ligament Examination Lachman: normal (-1 to 2mm) PCL-Posterior Drawer: normal (0 to 2mm)     MCL - Valgus: normal (0 to 2mm)  LCL - Varus: normal  Patella   Patellar apprehension: negative  Passive Patellar Tilt: neutral  Patellar Tracking: normal  Patellar Grind: negative    Other   Muscle Tightness: hamstring tightness  Sensation: normal    Left Knee Exam     Inspection   Erythema: absent  Scars: absent  Swelling: absent  Effusion: absent  Deformity: absent  Bruising: absent    Tenderness   The patient is experiencing no tenderness.     Range of Motion   Extension: -5   Flexion: 110     Tests   Meniscus   Artie:  Medial - negative Lateral - negative  Stability Lachman: normal (-1 to 2mm) PCL-Posterior Drawer: normal (0 to 2mm)  MCL - Valgus: normal (0 to 2mm)  LCL - Varus: normal (0 to 2mm)  Patella   Patellar apprehension: negative  Passive Patellar Tilt: neutral  Patellar Tracking: normal  Patellar Grind: negative    Other   Muscle Tightness: hamstring tightness  Sensation: normal    Muscle Strength   Right Lower Extremity   Quadriceps:  5/5   Hamstrin/5   Left Lower Extremity   Quadriceps:  5/5   Hamstrin/5     Vascular Exam     Right Pulses  Dorsalis Pedis:      2+  Posterior Tibial:      2+        Left Pulses  Dorsalis Pedis:      2+  Posterior Tibial:      2+        Edema  Right Lower Leg: absent  Left Lower Leg: absent    Radiographs right knee     My interpretation:    Moderate joint space narrowing of the medial compartment with tricompartmental osteophyte formation          Assessment:        Encounter Diagnoses   Name Primary?    Synovial cyst of right popliteal space     Primary osteoarthritis of right knee Yes          Plan:       Lori was seen today for pain.    Diagnoses and all orders for this visit:    Primary osteoarthritis of right knee  -     Ambulatory referral/consult to Orthopedics    Synovial cyst of right popliteal space  -     Ambulatory referral/consult to Orthopedics        1. I made the decision to order new imaging of the extremity or extremities evaluated. I independently reviewed and interpreted the radiographs and/or MRIs today. These images were shown to the patient where I then discussed my findings in detail.    2. We have discussed a variety of treatment options including medications, injections, physical therapy and other alternative treatments. I also explained the indications, risks and benefits of surgery. Patient's pain is refractory HEP, conservative management, and NSAIDs. Had a lengthy discussion about osteoarthritis in the knees to include the primary causes to include excessive weight, trauma, genetics, and muscle weakness as well as the different forms of treatment used to help alleviate symptoms including CSI, physical therapy, and Visco supplementation injections.  At this time, Pt would like to proceed with Formal physical therapy.    4. Ice compress to the affected area 2-3x a day for 15-20 minutes as needed for pain management.  Meloxicam daily for pain.  Patient will see run out of this.  She was instructed to contact the clinic in the future if pain is too great without taking it, and we will order a refill.    5. Ambulatory referral to [facility] for patellofemoral and quad strength and conditioning protocol.     6. RTC to see Ishaan Pulido PA-C in 8 weeks for follow-up.  Will reassess knee pain and determine if patient could benefit from possible injections      All of the patient's questions were answered. Patient was advised to  call the clinic or contact me through the patient portal for any questions or concerns.       Medical Dictation software was used during the dictation of portions or the entirety of this medical record.  Phonetic or grammatic errors may exist due to the use of this software. For clarification, refer to the author of the document.

## 2022-09-18 DIAGNOSIS — I10 ESSENTIAL HYPERTENSION: ICD-10-CM

## 2022-09-19 RX ORDER — CARVEDILOL 25 MG/1
25 TABLET ORAL 2 TIMES DAILY WITH MEALS
Qty: 60 TABLET | Refills: 0 | Status: SHIPPED | OUTPATIENT
Start: 2022-09-19 | End: 2022-12-11 | Stop reason: SDUPTHER

## 2022-09-20 NOTE — TELEPHONE ENCOUNTER
Name: JOSE SELLERS MRN: [id number]   Date: 11/23/2022 Status: Juan   Appt Time: 3:30 PM Length: 30   Visit Type: EP - PRIMARY CARE ([facility]) [486] Copay: $30.00   Provider: Anna Dos Santos MD Dept: [facility], Family Medicine       Dept Address: [address] [place], [place] [address]lakhwinder Youssef[address] [address]       Dept Phone Number: [phone]     
No new care gaps identified.  [facility] Embedded Care Gaps. Reference number: [id number]. 9/18/2022   7:41:23 AM MELBAT  
Please see the attached refill request.  
Yes

## 2022-11-09 ENCOUNTER — PATIENT MESSAGE (OUTPATIENT)
Dept: ADMINISTRATIVE | Facility: HOSPITAL | Age: 57
End: 2022-11-09
Payer: COMMERCIAL

## 2022-11-09 ENCOUNTER — PATIENT OUTREACH (OUTPATIENT)
Dept: ADMINISTRATIVE | Facility: HOSPITAL | Age: 57
End: 2022-11-09
Payer: COMMERCIAL

## 2022-11-09 NOTE — PROGRESS NOTES
Care Everywhere updates requested and reviewed.  Immunizations reconciled. Media reports reviewed.  Duplicate HM overrides and  orders removed.  Overdue HM topic chart audit and/or requested.  Overdue lab testing linked to upcoming lab appointments if applies.        Health Maintenance Due   Topic Date Due    Pneumococcal Vaccines (Age 0-64) (1 - PCV) Never done    TETANUS VACCINE  Never done    Shingles Vaccine (1 of 2) Never done    Colorectal Cancer Screening  2020    COVID-19 Vaccine (3 - Booster for Pfizer series) 2021    Lipid Panel  09/15/2021    Influenza Vaccine (1) Never done

## 2022-11-09 NOTE — LETTER
November 16, 2022    Angela Marie Peters [address] [place] [address]             [facility]  [address]  [facility] [address]  Phone: [phone] Dear Angela [facility] is committed to your overall health.  To help you get the most out of each of your visits, we will review your information to make sure you are up to date on all of your recommended tests and/or procedures.       Anna Dos Santos MD  has found that your chart shows you may be due for :         Health Maintenance Due   Topic    Pneumococcal Vaccines (Age 0-64) (1 - PCV)    TETANUS VACCINE     Shingles Vaccine (1 of 2)    Colorectal Cancer Screening     COVID-19 Vaccine (3 - Booster for Pfizer series)    Lipid Panel     Influenza Vaccine (1)         If you have had any of the above done at another facility, please bring the records or information with you so that your record at [facility] will be complete.  If you would like to schedule any of these test, please call [phone] or send a message via [email or web address].        If you are currently taking medication, please bring it with you to your appointment for review.           Thank you for letting us care for you,        Anna Dos Santos MD and your [facility] Team

## 2022-11-16 ENCOUNTER — PATIENT OUTREACH (OUTPATIENT)
Dept: ADMINISTRATIVE | Facility: HOSPITAL | Age: 57
End: 2022-11-16
Payer: COMMERCIAL

## 2022-11-23 ENCOUNTER — OFFICE VISIT (OUTPATIENT)
Dept: FAMILY MEDICINE | Facility: CLINIC | Age: 57
End: 2022-11-23
Payer: COMMERCIAL

## 2022-11-23 VITALS
WEIGHT: 234.81 LBS | TEMPERATURE: 98 F | SYSTOLIC BLOOD PRESSURE: 138 MMHG | OXYGEN SATURATION: 96 % | HEIGHT: 71 IN | BODY MASS INDEX: 32.87 KG/M2 | DIASTOLIC BLOOD PRESSURE: 80 MMHG | HEART RATE: 69 BPM

## 2022-11-23 DIAGNOSIS — R73.03 PRE-DIABETES: ICD-10-CM

## 2022-11-23 DIAGNOSIS — Z86.73 HISTORY OF STROKE: ICD-10-CM

## 2022-11-23 DIAGNOSIS — I10 ESSENTIAL HYPERTENSION: Primary | ICD-10-CM

## 2022-11-23 DIAGNOSIS — Z12.11 COLON CANCER SCREENING: ICD-10-CM

## 2022-11-23 DIAGNOSIS — E78.5 HYPERLIPIDEMIA, UNSPECIFIED HYPERLIPIDEMIA TYPE: ICD-10-CM

## 2022-11-23 PROCEDURE — 1160F PR REVIEW ALL MEDS BY PRESCRIBER/CLIN PHARMACIST DOCUMENTED: ICD-10-PCS | Mod: CPTII,S$GLB,, | Performed by: INTERNAL MEDICINE

## 2022-11-23 PROCEDURE — 99999 PR PBB SHADOW E&M-EST. PATIENT-LVL V: CPT | Mod: PBBFAC,,, | Performed by: INTERNAL MEDICINE

## 2022-11-23 PROCEDURE — 3008F BODY MASS INDEX DOCD: CPT | Mod: CPTII,S$GLB,, | Performed by: INTERNAL MEDICINE

## 2022-11-23 PROCEDURE — 3075F SYST BP GE 130 - 139MM HG: CPT | Mod: CPTII,S$GLB,, | Performed by: INTERNAL MEDICINE

## 2022-11-23 PROCEDURE — 3075F PR MOST RECENT SYSTOLIC BLOOD PRESS GE 130-139MM HG: ICD-10-PCS | Mod: CPTII,S$GLB,, | Performed by: INTERNAL MEDICINE

## 2022-11-23 PROCEDURE — 3079F PR MOST RECENT DIASTOLIC BLOOD PRESSURE 80-89 MM HG: ICD-10-PCS | Mod: CPTII,S$GLB,, | Performed by: INTERNAL MEDICINE

## 2022-11-23 PROCEDURE — 1159F MED LIST DOCD IN RCRD: CPT | Mod: CPTII,S$GLB,, | Performed by: INTERNAL MEDICINE

## 2022-11-23 PROCEDURE — 1159F PR MEDICATION LIST DOCUMENTED IN MEDICAL RECORD: ICD-10-PCS | Mod: CPTII,S$GLB,, | Performed by: INTERNAL MEDICINE

## 2022-11-23 PROCEDURE — 3008F PR BODY MASS INDEX (BMI) DOCUMENTED: ICD-10-PCS | Mod: CPTII,S$GLB,, | Performed by: INTERNAL MEDICINE

## 2022-11-23 PROCEDURE — 1160F RVW MEDS BY RX/DR IN RCRD: CPT | Mod: CPTII,S$GLB,, | Performed by: INTERNAL MEDICINE

## 2022-11-23 PROCEDURE — 99999 PR PBB SHADOW E&M-EST. PATIENT-LVL V: ICD-10-PCS | Mod: PBBFAC,,, | Performed by: INTERNAL MEDICINE

## 2022-11-23 PROCEDURE — 3079F DIAST BP 80-89 MM HG: CPT | Mod: CPTII,S$GLB,, | Performed by: INTERNAL MEDICINE

## 2022-11-23 PROCEDURE — 99214 PR OFFICE/OUTPT VISIT, EST, LEVL IV, 30-39 MIN: ICD-10-PCS | Mod: S$GLB,,, | Performed by: INTERNAL MEDICINE

## 2022-11-23 PROCEDURE — 99214 OFFICE O/P EST MOD 30 MIN: CPT | Mod: S$GLB,,, | Performed by: INTERNAL MEDICINE

## 2022-11-23 NOTE — PROGRESS NOTES
Subjective:       Patient ID: Lori Burgess is a 57 y.o. female.    Chief Complaint: Follow-up      HPI  Lori Burgess is a 57 y.o. female with chronic conditions of  hypertension, hyperlipidemia, prediabetes, and history of stroke (presented with left-sided weakness but resolved).  who presents today for routine health maintenance.    Reports she has been watching her diet.  Stays active at work as a .  Making healthier options.  Has lost around 14 lb.    She is compliant with medications.  Denies headaches, chest pains, palpitations, or shortness of breath.    No toxic habits.    Had mammogram.  Pending stool test but has history of colon cancer in the family and was supposed to have colonoscopy in 2020.    Health Maintenance:  Health Maintenance   Topic Date Due    TETANUS VACCINE  Never done    Lipid Panel  09/15/2021    Mammogram  07/18/2023    High Dose Statin  11/23/2023    Hepatitis C Screening  Completed       Review of Systems   Constitutional: Negative.  Negative for fever and unexpected weight change.   HENT: Negative.     Respiratory: Negative.     Cardiovascular: Negative.    Gastrointestinal: Negative.    Genitourinary:  Negative for difficulty urinating.   Musculoskeletal: Negative.    Integumentary:  Negative.   Neurological: Negative.    Psychiatric/Behavioral: Negative.      Past Medical History:   Diagnosis Date    Anemia     Diverticulosis     History of gastroesophageal reflux (GERD)     Hyperlipidemia     Hypertension     Stroke 2015    Trauma 1998    Fractured Right ankle       Past Surgical History:   Procedure Laterality Date    BARTHOLIN GLAND CYST EXCISION      Marsupialization, not excision    HYSTERECTOMY  10/2006    Laparoscopic Supracervical Hysterectomy    TUBAL LIGATION         Family History   Problem Relation Age of Onset    Colon cancer Mother 55    Hypertension Mother     Diabetes Father     Hypertension Father     Hypertension Brother     Hypertension  Sister     Heart disease Neg Hx     Heart attack Neg Hx     Breast cancer Neg Hx     Ovarian cancer Neg Hx        Social History     Socioeconomic History    Marital status: Single   Occupational History     Employer: [employer]   Tobacco Use    Smoking status: Never    Smokeless tobacco: Never   Substance and Sexual Activity    Alcohol use: Not Currently    Drug use: No    Sexual activity: Not Currently     Birth control/protection: None, Condom, See Surgical Hx     Comment: Hysterectomy     Social Determinants of Health     Financial Resource Strain: Medium Risk    Difficulty of Paying Living Expenses: Somewhat hard   Food Insecurity: Food Insecurity Present    Worried About Running Out of Food in the Last Year: Sometimes true    Ran Out of Food in the Last Year: Sometimes true   Transportation Needs: No Transportation Needs    Lack of Transportation (Medical): No    Lack of Transportation (Non-Medical): No   Physical Activity: Unknown    Days of Exercise per Week: 6 days   Stress: No Stress Concern Present    Feeling of Stress : Not at all   Social Connections: Unknown    Frequency of Communication with Friends and Family: More than three times a week    Frequency of Social Gatherings with Friends and Family: More than three times a week    Active Member of Clubs or Organizations: No    Attends Club or Organization Meetings: Never    Marital Status:    Housing Stability: High Risk    Unable to Pay for Housing in the Last Year: Yes    Number of Places Lived in the Last Year: 1    Unstable Housing in the Last Year: No       Current Outpatient Medications   Medication Sig Dispense Refill    aspirin (ECOTRIN) 325 MG EC tablet Take 1 tablet (325 mg total) by mouth once daily.  0    atorvastatin (LIPITOR) 20 MG tablet Take 1 tablet (20 mg total) by mouth once daily. 30 tablet 0    carvediloL (COREG) 25 MG tablet Take 1 tablet (25 mg total) by mouth 2 (two) times daily with meals. 60 tablet 0     naproxen (NAPROSYN) 500 MG tablet Take 1 tablet (500 mg total) by mouth 2 (two) times daily with meals. 60 tablet 0    NIFEdipine (PROCARDIA-XL) 90 MG (OSM) 24 hr tablet Take 1 tablet (90 mg total) by mouth once daily. 90 tablet 0    potassium chloride (KLOR-CON) 10 MEQ TbSR Take 2 tablets by mouth twice daily 360 tablet 0    triamterene-hydrochlorothiazide 37.5-25 mg (DYAZIDE) 37.5-25 mg per capsule Take 1 capsule by mouth once daily. 90 capsule 0     No current facility-administered medications for this visit.       Review of patient's allergies indicates:   Allergen Reactions    Ace inhibitors Swelling    Benicar [olmesartan] Swelling    Methyldopa Other (See Comments)     sleepy    Clonidine Other (See Comments)     sleepy         Objective:       Last 3 sets of Vitals    Vitals - 1 value per visit 8/24/2022 11/23/2022 11/23/2022   SYSTOLIC - - 138   DIASTOLIC - - 80   Pulse - - 69   Temp - - 97.6   Resp - - -   SPO2 - - 96   Weight (lb) 200 - 234.79   Weight (kg) 90.719 - 106.5   Height 71 - 71   BMI (Calculated) 27.9 - 32.8   VISIT REPORT - - -   Pain Score  - 0 -   Some recent data might be hidden   Physical Exam  Constitutional:       General: She is not in acute distress.  HENT:      Head: Normocephalic.      Right Ear: Tympanic membrane, ear canal and external ear normal.      Left Ear: Tympanic membrane, ear canal and external ear normal.      Nose: Nose normal.      Mouth/Throat:      Mouth: Mucous membranes are moist.   Eyes:      General: No scleral icterus.     Extraocular Movements: Extraocular movements intact.      Conjunctiva/sclera: Conjunctivae normal.   Neck:      Vascular: No carotid bruit.      Comments: No goiter.  Cardiovascular:      Rate and Rhythm: Normal rate and regular rhythm.      Pulses: Normal pulses.      Heart sounds: Normal heart sounds.   Pulmonary:      Effort: Pulmonary effort is normal.      Breath sounds: Normal breath sounds.   Abdominal:      General: Bowel sounds are  normal. There is no distension.      Palpations: Abdomen is soft. There is no mass.      Tenderness: There is no abdominal tenderness.   Musculoskeletal:         General: No swelling.   Lymphadenopathy:      Cervical: No cervical adenopathy.   Skin:     General: Skin is warm and dry.   Neurological:      General: No focal deficit present.      Mental Status: She is alert and oriented to person, place, and time.   Psychiatric:         Mood and Affect: Mood normal.         Behavior: Behavior normal.         CBC:  Recent Labs   Lab 09/10/20  0953 09/29/20  1010 03/04/22  1446   WBC 7.14 5.13 5.56   RBC 4.54 4.62 4.61   Hemoglobin 11.0 L 11.1 L 11.1 L   Hematocrit 35.7 L 36.8 L 35.3 L   Platelets 299 249 239   MCV 79 L 80 L 77 L   MCH 24.2 L 24.0 L 24.1 L   MCHC 30.8 L 30.2 L 31.4 L     CMP:  Recent Labs   Lab 09/10/20  0953 09/15/20  0842 09/29/20  1010 03/04/22  1446   Glucose 117 H   < > 85 107   Calcium 9.4   < > 9.5 9.1   Albumin 3.5  --  3.4 L  --    Total Protein 7.5  --  7.6  --    Sodium 143   < > 142 142   Potassium 3.1 L   < > 3.6 3.1 L   CO2 30 H   < > 32 H 31 H   Chloride 102   < > 102 104   BUN 23 H   < > 15 11   Creatinine 1.0   < > 1.0 0.74   Alkaline Phosphatase 76  --  91  --    ALT 15  --  13  --    AST 16  --  12  --    Total Bilirubin 0.6  --  0.8  --     < > = values in this interval not displayed.     URINALYSIS:  Recent Labs   Lab 09/10/20  0931 09/15/20  0918 09/29/20  0954   Color, UA Yael Yellow Yellow   Specific Gravity, UA 1.030 1.020 1.025   pH, UA 5.0 7.0 6.0   Protein, UA 1+ A Trace A 1+ A   Bacteria Occasional Moderate A Rare   Nitrite, UA Negative Negative Negative   Leukocytes, UA 1+ A 3+ A 2+ A   Urobilinogen, UA  --  1.0  --    Hyaline Casts, UA 5 A  --  0      LIPIDS:  Recent Labs   Lab 09/15/20  0842 09/29/20  1010   TSH  --  0.719   HDL 43  --    Cholesterol 127  --    Triglycerides 80  --    LDL Cholesterol 68.0  --    HDL/Cholesterol Ratio 33.9  --    Non-HDL Cholesterol 84  --     Total Cholesterol/HDL Ratio 3.0  --      TSH:  Recent Labs   Lab 09/29/20  1010   TSH 0.719       A1C:        Imaging:  US Lower Extremity Veins Bilateral  Narrative: EXAMINATION:  US LOWER EXTREMITY VEINS BILATERAL    CLINICAL HISTORY:  Pain in left knee    TECHNIQUE:  Duplex and color flow Doppler and dynamic compression was performed of the bilateral lower extremity veins was performed.    COMPARISON:  None    FINDINGS:  Right thigh veins: The common femoral, femoral, popliteal, upper greater saphenous, and deep femoral veins are patent and free of thrombus. The veins are normally compressible and have normal phasic flow and augmentation response.    Right calf veins: The visualized calf veins are patent.    Left thigh veins: The common femoral, femoral, popliteal, upper greater saphenous, and deep femoral veins are patent and free of thrombus. The veins are normally compressible and have normal phasic flow and augmentation response.    Left calf veins: The visualized calf veins are patent.    Miscellaneous: Anechoic, avascular right popliteal cystic lesion measuring 3.3 x 1.2 x 2.5 cm compatible popliteal fossa/Baker's cyst.  Impression: No evidence of deep venous thrombosis in either lower extremity.    Suggested right popliteal fossa Baker's cyst.    Electronically signed by: Maurizio Sevilla  Date:    08/14/2022  Time:    11:48  X-Ray Knee 3 View Right  Narrative: EXAMINATION:  XR KNEE 3 VIEW RIGHT    CLINICAL HISTORY:  Pain in right knee    TECHNIQUE:  AP, lateral, and fibular views of the right knee were performed.    COMPARISON:  None    FINDINGS:  Medial compartment joint space narrowing with subchondral sclerosis and small marginal osteophyte formation.  Minimal dorsal spur from the patella.  Small quadriceps enthesophyte.  No fracture or dislocation.  No joint effusion.  Impression: Degenerative change without fracture or dislocation.    Electronically signed by: Meenakshi Loco  MD  Date:    08/14/2022  Time:    10:24      Assessment:       1. Essential hypertension    2. Hyperlipidemia, unspecified hyperlipidemia type    3. Pre-diabetes    4. History of stroke    5. Colon cancer screening            Plan:       1. Essential hypertension  -     CBC Auto Differential; Future; Expected date: 11/23/2022  -     Comprehensive Metabolic Panel; Future; Expected date: 11/23/2022  -     TSH; Future; Expected date: 11/23/2022  - stable.  Continue to monitor with same treatment    2. Hyperlipidemia, unspecified hyperlipidemia type  -     Lipid Panel; Future; Expected date: 11/23/2022  -  on atorvastatin.    3. Pre-diabetes  -     Comprehensive Metabolic Panel; Future; Expected date: 11/23/2022  -     Hemoglobin A1C; Future; Expected date: 11/23/2022  - last A1c was 5.9    4. History of stroke   - on aspirin and atorvastatin.  Blood pressure controlled.    5. Colon cancer screening  -     Ambulatory referral/consult to Gastroenterology; Future; Expected date: 11/23/2022     Health Maintenance Due   Topic Date Due    Pneumococcal Vaccines (Age 0-64) (1 - PCV) Never done    TETANUS VACCINE  Never done    Shingles Vaccine (1 of 2) Never done    Colorectal Cancer Screening  05/28/2020    COVID-19 Vaccine (3 - Booster for Pfizer series) 06/25/2021    Lipid Panel  09/15/2021    Influenza Vaccine (1) Never done            Return to clinic in 6 months.    Anna Dos Santos MD  [facility]  Disclaimer:  This note has been generated using voice-recognition software. There may be grammatical or spelling errors that have been missed during proof-reading         No

## 2022-11-26 ENCOUNTER — TELEPHONE (OUTPATIENT)
Dept: FAMILY MEDICINE | Facility: CLINIC | Age: 57
End: 2022-11-26
Payer: COMMERCIAL

## 2022-11-26 DIAGNOSIS — Z12.11 COLON CANCER SCREENING: Primary | ICD-10-CM

## 2022-11-26 NOTE — TELEPHONE ENCOUNTER
----- Message from Apple Cooper MA sent at 11/25/2022 10:56 AM CST -----    ----- Message -----  From: Melly Vizcarra  Sent: 11/25/2022  10:05 AM CST  To: Raya Snow    Good morning,  Dr. Dos Santos placed a referral for the patient to see gastro with reason being colonoscopy. If she just needs a colonoscopy please place a case request. If she needs to be seen in the office first there would need to be an additional diagnosis in order for me to schedule.    Thank you

## 2023-01-18 ENCOUNTER — TELEPHONE (OUTPATIENT)
Dept: FAMILY MEDICINE | Facility: CLINIC | Age: 58
End: 2023-01-18
Payer: COMMERCIAL

## 2023-01-18 NOTE — TELEPHONE ENCOUNTER
Van with [facility] would like to know if it's okay to fill Potassium 10meq with other meds because of high potassium levels. Pls advise.

## 2023-01-18 NOTE — TELEPHONE ENCOUNTER
----- Message from Romero Hager sent at 1/18/2023  3:16 PM CST -----  .Type:  RX Refill Request    Who Called: van [facility]  Refill or New Rx:refill  RX Name and Strength:potassium chloride (KLOR-CON) 10 MEQ TbSR  How is the patient currently taking it? (ex. 1XDay):2x's daily  Is this a 30 day or 90 day RX:360 tablets  Preferred Pharmacy with phone number:[facility] [address] [place], [place] - [address] [facility]  Local or Mail Order:local  Ordering Provider:Raya  Would the patient rather a call back or a response via [facility]? call  Best Call Back Number:[phone]  Additional Information:      Is it okay to fill with other meds b/c of high potassium levels

## 2023-02-10 ENCOUNTER — E-VISIT (OUTPATIENT)
Dept: FAMILY MEDICINE | Facility: CLINIC | Age: 58
End: 2023-02-10
Payer: COMMERCIAL

## 2023-02-10 VITALS
BODY MASS INDEX: 28.59 KG/M2 | DIASTOLIC BLOOD PRESSURE: 86 MMHG | WEIGHT: 205 LBS | TEMPERATURE: 98 F | HEART RATE: 71 BPM | SYSTOLIC BLOOD PRESSURE: 130 MMHG

## 2023-02-10 DIAGNOSIS — R30.0 DYSURIA: Primary | ICD-10-CM

## 2023-02-10 PROCEDURE — 99421 PR E&M, ONLINE DIGIT, EST, < 7 DAYS, 5-10 MINS: ICD-10-PCS | Mod: S$GLB,,, | Performed by: INTERNAL MEDICINE

## 2023-02-10 PROCEDURE — 99421 OL DIG E/M SVC 5-10 MIN: CPT | Mod: S$GLB,,, | Performed by: INTERNAL MEDICINE

## 2023-02-10 RX ORDER — FLUCONAZOLE 150 MG/1
150 TABLET ORAL DAILY
Qty: 1 TABLET | Refills: 0 | Status: SHIPPED | OUTPATIENT
Start: 2023-02-10 | End: 2023-02-11

## 2023-02-10 NOTE — PROGRESS NOTES
Patient ID: Lori Burgess is a 57 y.o. female.    Chief Complaint: Urinary Tract Infection (The patient reports pain with urination, mild vaginal discharge, and irritation after changing soap.  There has been no fever and urine looks clear.)    The patient initiated a request through [employer] on 2/10/2023 for evaluation and management with a chief complaint of Urinary Tract Infection (The patient reports pain with urination, mild vaginal discharge, and irritation after changing soap.  There has been no fever and urine looks clear.)     I evaluated the questionnaire submission on 02/10/2023.    [facility] Peq Evisit Uti Questionnaire    2/10/2023  1:49 PM CST - Filed by Patient   Do you agree to participate in an E-Visit? Yes   If you have any of the following problems, please present to your local ER or call 911:  I acknowledge   What is the main issue that you would like for your doctor to address today? I switched soap and now I have vaginal discomfort   Are you able to take your vital signs? Yes   Systolic Blood Pressure: 130   Diastolic Blood Pressure: 86   Weight: 205   Height: 71   Pulse:    Temp: 98   Resp:    Pulse Ox:    What symptoms do you currently have? Pain while passing urine   When did your symptoms first appear? 2/9/2023   List what you have done or taken to help your symptoms. switched back to my regular soap   Please indicate whether you have had the following symptoms during the past 24 hours     Urgent urination (a sudden and uncontrollable urge to urinate) None   Frequent urination of small amounts of urine (going to the toilet very often) None   Burning pain when urinating Mild   Incomplete bladder emptying (still feel like you need to urinate again after urination) None   Pain not associated with urination in the lower abdomen below the belly button) None   What does your urine look like? Clear   Blood seen in the urine (without menstrual cycle) None   Flank pain (pain in one or both sides of  the lower back) None   Abnormal Vaginal Discharge (abnormal amount, color and/or odor) Mild   Discharge from the urethra (urinary opening) without urination None   High body temperature/fever? None-<99.5   Please rate how much discomfort you have experience because of the symptoms in the past 24 hours: None   Please indicate how the symptoms have interfered with your every day activities/work in the past 24 hours: None   Please indicate how these symptoms have interfered with your social activities (visiting people, meeting with friends, etc.) in the past 24 hours? None   Are you a diabetic? No   If you are female, please indicate whether you have the following at the time of completion of this questionnaire: Signs of menopause syndrome (hot flashes)   Are you currently pregnant, could you be pregnant, or are you breast feeding? None of the above   Provide any information you feel is important to your history not asked above n/a   Please attach any relevant images or files (if you have performed a home test for UTI, please submit a photo of results)           Active Problem List with Overview Notes    Diagnosis Date Noted    Right knee pain 08/14/2022    Posterior knee pain, left 08/14/2022    Synovial cyst of right popliteal space 08/14/2022    Osteoarthritis of right knee 08/14/2022    History of gastroesophageal reflux (GERD)     Hyperlipidemia     Chronic fatigue     Other dysphagia     Weight loss     Rash/skin eruption 09/02/2020    Pre-diabetes 04/30/2017    Hypokalemia 04/30/2017    Vitamin D insufficiency 04/30/2017    Cerebral thrombosis with cerebral infarction 08/09/2015    Left hemiparesis 08/09/2015    Severe low back pain 03/03/2015    Edema 02/01/2013    GERD (gastroesophageal reflux disease) 10/09/2012    Liver cyst 10/09/2012    Renal cyst 10/09/2012    Diverticulosis 10/09/2012    Anemia     HTN (hypertension) 07/18/2012      Recent Labs Obtained:  No visits with results within 7 Day(s) from this  visit.   Latest known visit with results is:   Admission on 03/04/2022, Discharged on 03/04/2022   Component Date Value Ref Range Status    WBC 03/04/2022 5.56  3.90 - 12.70 K/uL Final    RBC 03/04/2022 4.61  4.00 - 5.40 M/uL Final    Hemoglobin 03/04/2022 11.1 (L)  12.0 - 16.0 g/dL Final    Hematocrit 03/04/2022 35.3 (L)  37.0 - 48.5 % Final    MCV 03/04/2022 77 (L)  82 - 98 fL Final    MCH 03/04/2022 24.1 (L)  27.0 - 31.0 pg Final    MCHC 03/04/2022 31.4 (L)  32.0 - 36.0 g/dL Final    RDW 03/04/2022 15.9 (H)  11.5 - 14.5 % Final    Platelets 03/04/2022 239  150 - 450 K/uL Final    MPV 03/04/2022 11.0  9.2 - 12.9 fL Final    Immature Granulocytes 03/04/2022 0.2  0.0 - 0.5 % Final    Gran # (ANC) 03/04/2022 3.4  1.8 - 7.7 K/uL Final    Immature Grans (Abs) 03/04/2022 0.01  0.00 - 0.04 K/uL Final    Comment: Mild elevation in immature granulocytes is non specific and   can be seen in a variety of conditions including stress response,   acute inflammation, trauma and pregnancy. Correlation with other   laboratory and clinical findings is essential.      Lymph # 03/04/2022 1.7  1.0 - 4.8 K/uL Final    Mono # 03/04/2022 0.4  0.3 - 1.0 K/uL Final    Eos # 03/04/2022 0.1  0.0 - 0.5 K/uL Final    Baso # 03/04/2022 0.02  0.00 - 0.20 K/uL Final    nRBC 03/04/2022 0  0 /100 WBC Final    Gran % 03/04/2022 60.4  38.0 - 73.0 % Final    Lymph % 03/04/2022 30.9  18.0 - 48.0 % Final    Mono % 03/04/2022 6.8  4.0 - 15.0 % Final    Eosinophil % 03/04/2022 1.3  0.0 - 8.0 % Final    Basophil % 03/04/2022 0.4  0.0 - 1.9 % Final    Differential Method 03/04/2022 Automated   Final    Sodium 03/04/2022 142  136 - 145 mmol/L Final    Potassium 03/04/2022 3.1 (L)  3.5 - 5.1 mmol/L Final    Chloride 03/04/2022 104  95 - 110 mmol/L Final    CO2 03/04/2022 31 (H)  23 - 29 mmol/L Final    Glucose 03/04/2022 107  70 - 110 mg/dL Final    BUN 03/04/2022 11  7 - 17 mg/dL Final    Creatinine 03/04/2022 0.74  0.50 - 1.40 mg/dL Final    Calcium  03/04/2022 9.1  8.7 - 10.5 mg/dL Final    Anion Gap 03/04/2022 7 (L)  8 - 16 mmol/L Final    eGFR if African American 03/04/2022 >60.0  >60 mL/min/1.73 m^2 Final    eGFR if non African American 03/04/2022 >60.0  >60 mL/min/1.73 m^2 Final    Comment: Calculation used to obtain the estimated glomerular filtration  rate (eGFR) is the CKD-EPI equation.       Uric Acid 03/04/2022 5.4  2.4 - 5.7 mg/dL Final       Encounter Diagnosis   Name Primary?    Dysuria Yes        Orders Placed This Encounter   Procedures    Urinalysis, Reflex to Urine Culture Urine, Clean Catch     Standing Status:   Future     Standing Expiration Date:   6/10/2023     Order Specific Question:   Preferred Collection Type     Answer:   Urine, Clean Catch     Order Specific Question:   Specimen Source     Answer:   Urine            E-Visit Time Tracking:

## 2023-02-10 NOTE — PROGRESS NOTES
Informed patient urine orders have been placed and also to go any time next week to have test redone

## 2023-03-08 ENCOUNTER — TELEPHONE (OUTPATIENT)
Dept: GASTROENTEROLOGY | Facility: CLINIC | Age: 58
End: 2023-03-08
Payer: COMMERCIAL

## 2023-03-08 DIAGNOSIS — Z12.11 SCREEN FOR COLON CANCER: Primary | ICD-10-CM

## 2023-03-08 RX ORDER — POLYETHYLENE GLYCOL 3350, SODIUM SULFATE ANHYDROUS, SODIUM BICARBONATE, SODIUM CHLORIDE, POTASSIUM CHLORIDE 236; 22.74; 6.74; 5.86; 2.97 G/4L; G/4L; G/4L; G/4L; G/4L
4 POWDER, FOR SOLUTION ORAL ONCE
Qty: 4000 ML | Refills: 0 | Status: SHIPPED | OUTPATIENT
Start: 2023-03-08 | End: 2023-03-08

## 2023-03-08 NOTE — TELEPHONE ENCOUNTER
Endoscopy Scheduling Questionnaire:     Are you taking any blood thinners? No               If Yes  Have you been on them for longer than one year?  N/A    2. Have you been diagnosed with Diverticulitis in past three months?   No    3. Are you on dialysis or have Kidney Disease?  No    4. Previous Colonoscopy?   Yes         If yes Do you have a history of colon polyps?   No every 5 years      6. Are you a diabetic?   No    7. Do you have a history of constipation?   No      Procedure scheduled with Dr. Sam Ospina MD  on April 17, 2023     The prep being used is Golytely     The patient's prep instructions were sent via portal message on 03/08/2023.

## 2023-03-14 ENCOUNTER — PATIENT MESSAGE (OUTPATIENT)
Dept: GASTROENTEROLOGY | Facility: CLINIC | Age: 58
End: 2023-03-14
Payer: COMMERCIAL

## 2023-03-16 ENCOUNTER — TELEPHONE (OUTPATIENT)
Dept: GASTROENTEROLOGY | Facility: CLINIC | Age: 58
End: 2023-03-16
Payer: COMMERCIAL

## 2023-03-16 RX ORDER — POLYETHYLENE GLYCOL 3350, SODIUM SULFATE ANHYDROUS, SODIUM BICARBONATE, SODIUM CHLORIDE, POTASSIUM CHLORIDE 236; 22.74; 6.74; 5.86; 2.97 G/4L; G/4L; G/4L; G/4L; G/4L
4 POWDER, FOR SOLUTION ORAL ONCE
Qty: 4000 ML | Refills: 0 | Status: SHIPPED | OUTPATIENT
Start: 2023-03-16 | End: 2023-03-16

## 2023-03-16 NOTE — TELEPHONE ENCOUNTER
RE: GI Colonoscopy Referral Call     Spoke with patient regarding change in Colonscopy prep to Generic Brand. New order placed and sent for signature to Dr Ospina.  Call outcome: Clinic number provided.  Patient verbalized understanding.      Call time: 2 minutes

## 2023-04-13 ENCOUNTER — TELEPHONE (OUTPATIENT)
Dept: ENDOSCOPY | Facility: HOSPITAL | Age: 58
End: 2023-04-13
Payer: COMMERCIAL

## 2023-04-13 ENCOUNTER — PATIENT MESSAGE (OUTPATIENT)
Dept: ENDOSCOPY | Facility: HOSPITAL | Age: 58
End: 2023-04-13
Payer: COMMERCIAL

## 2023-04-13 NOTE — TELEPHONE ENCOUNTER
Left message instructing patient to call dept @ [phone] between 8am-4pm.    Arrival time to be given @ 1130 monday  (Message sent via [facility] portal)

## 2023-04-14 ENCOUNTER — TELEPHONE (OUTPATIENT)
Dept: ENDOSCOPY | Facility: HOSPITAL | Age: 58
End: 2023-04-14
Payer: COMMERCIAL

## 2023-04-14 NOTE — TELEPHONE ENCOUNTER
Spoke with patient about arrival time @ 1130.       Prep instructions reviewed: the day before the procedure, follow a clear liquid diet all day, then start the first 1/2 of prep at 5pm and take 2nd 1/2 of prep @ 0630.  Pt must be completely NPO when prep completed @ 0830.              Medications: Do not take Insulin or oral diabetic medications the day of the procedure.  Take as prescribed: heart, seizure and blood pressure medication in the morning with a sip of water (less than an ounce).  Take any breathing medications and bring inhalers to hospital with you Leave all valuables and jewelry at home.     Wear comfortable clothes to procedure to change into hospital gown You cannot drive for 24 hours after your procedure because you will receive sedation for your procedure to make you comfortable.  A ride must be provided at discharge.

## 2023-04-17 ENCOUNTER — ANESTHESIA (OUTPATIENT)
Dept: ENDOSCOPY | Facility: HOSPITAL | Age: 58
End: 2023-04-17
Payer: COMMERCIAL

## 2023-04-17 ENCOUNTER — HOSPITAL ENCOUNTER (OUTPATIENT)
Facility: HOSPITAL | Age: 58
Discharge: HOME | End: 2023-04-17
Attending: INTERNAL MEDICINE | Admitting: INTERNAL MEDICINE
Payer: COMMERCIAL

## 2023-04-17 ENCOUNTER — ANESTHESIA EVENT (OUTPATIENT)
Dept: ENDOSCOPY | Facility: HOSPITAL | Age: 58
End: 2023-04-17
Payer: COMMERCIAL

## 2023-04-17 DIAGNOSIS — Z12.11 SCREEN FOR COLON CANCER: ICD-10-CM

## 2023-04-17 PROCEDURE — G0105 COLORECTAL SCRN; HI RISK IND: HCPCS | Mod: ,,, | Performed by: INTERNAL MEDICINE

## 2023-04-17 PROCEDURE — D9220A PRA ANESTHESIA: ICD-10-PCS | Mod: ANES,,, | Performed by: ANESTHESIOLOGY

## 2023-04-17 PROCEDURE — D9220A PRA ANESTHESIA: Mod: CRNA,,, | Performed by: NURSE ANESTHETIST, CERTIFIED REGISTERED

## 2023-04-17 PROCEDURE — 25000003 PHARM REV CODE 250: Performed by: NURSE ANESTHETIST, CERTIFIED REGISTERED

## 2023-04-17 PROCEDURE — 37000008 HC ANESTHESIA 1ST 15 MINUTES: Performed by: INTERNAL MEDICINE

## 2023-04-17 PROCEDURE — 63600175 PHARM REV CODE 636 W HCPCS: Performed by: NURSE ANESTHETIST, CERTIFIED REGISTERED

## 2023-04-17 PROCEDURE — G0105 COLORECTAL SCRN; HI RISK IND: HCPCS | Performed by: INTERNAL MEDICINE

## 2023-04-17 PROCEDURE — D9220A PRA ANESTHESIA: ICD-10-PCS | Mod: CRNA,,, | Performed by: NURSE ANESTHETIST, CERTIFIED REGISTERED

## 2023-04-17 PROCEDURE — D9220A PRA ANESTHESIA: Mod: ANES,,, | Performed by: ANESTHESIOLOGY

## 2023-04-17 PROCEDURE — 37000009 HC ANESTHESIA EA ADD 15 MINS: Performed by: INTERNAL MEDICINE

## 2023-04-17 PROCEDURE — G0105 COLORECTAL SCRN; HI RISK IND: ICD-10-PCS | Mod: ,,, | Performed by: INTERNAL MEDICINE

## 2023-04-17 RX ORDER — LIDOCAINE HYDROCHLORIDE 20 MG/ML
INJECTION INTRAVENOUS
Status: DISCONTINUED | OUTPATIENT
Start: 2023-04-17 | End: 2023-04-17

## 2023-04-17 RX ORDER — SODIUM CHLORIDE 0.9 % (FLUSH) 0.9 %
10 SYRINGE (ML) INJECTION
Status: DISCONTINUED | OUTPATIENT
Start: 2023-04-17 | End: 2023-04-17 | Stop reason: HOSPADM

## 2023-04-17 RX ORDER — PROPOFOL 10 MG/ML
VIAL (ML) INTRAVENOUS CONTINUOUS PRN
Status: DISCONTINUED | OUTPATIENT
Start: 2023-04-17 | End: 2023-04-17

## 2023-04-17 RX ORDER — SODIUM CHLORIDE, SODIUM LACTATE, POTASSIUM CHLORIDE, CALCIUM CHLORIDE 600; 310; 30; 20 MG/100ML; MG/100ML; MG/100ML; MG/100ML
INJECTION, SOLUTION INTRAVENOUS CONTINUOUS PRN
Status: DISCONTINUED | OUTPATIENT
Start: 2023-04-17 | End: 2023-04-17

## 2023-04-17 RX ORDER — PROPOFOL 10 MG/ML
VIAL (ML) INTRAVENOUS
Status: DISCONTINUED | OUTPATIENT
Start: 2023-04-17 | End: 2023-04-17

## 2023-04-17 RX ORDER — SODIUM CHLORIDE 9 MG/ML
INJECTION, SOLUTION INTRAVENOUS CONTINUOUS
Status: DISCONTINUED | OUTPATIENT
Start: 2023-04-17 | End: 2023-04-17 | Stop reason: HOSPADM

## 2023-04-17 RX ADMIN — PROPOFOL 80 MG: 10 INJECTION, EMULSION INTRAVENOUS at 12:04

## 2023-04-17 RX ADMIN — PROPOFOL 150 MCG/KG/MIN: 10 INJECTION, EMULSION INTRAVENOUS at 12:04

## 2023-04-17 RX ADMIN — SODIUM CHLORIDE, SODIUM LACTATE, POTASSIUM CHLORIDE, AND CALCIUM CHLORIDE: .6; .31; .03; .02 INJECTION, SOLUTION INTRAVENOUS at 11:04

## 2023-04-17 RX ADMIN — LIDOCAINE HYDROCHLORIDE 75 MG: 20 INJECTION, SOLUTION INTRAVENOUS at 12:04

## 2023-04-17 NOTE — PROVATION PATIENT INSTRUCTIONS
Discharge Summary/Instructions after an Endoscopic Procedure  Patient Name: Lori Burgess  Patient MRN: [id number]  Patient YOB: 1965 Monday, April 17, 2023  Sam Ospina MD  Dear patient,  As a result of recent federal legislation (The Federal Cures Act), you may   receive lab or pathology results from your procedure in your [facility]   account before your physician is able to contact you. Your physician or   their representative will relay the results to you with their   recommendations at their soonest availability.  Thank you,  Your health is very important to us during the Covid Crisis. Following your   procedure today, you will receive a daily text for 2 weeks asking about   signs or symptoms of Covid 19.  Please respond to this text when you   receive it so we can follow up and keep you as safe as possible.   RESTRICTIONS:  During your procedure today, you received medications for sedation.  These   medications may affect your judgment, balance and coordination.  Therefore,   for 24 hours, you have the following restrictions:   - DO NOT drive a car, operate machinery, make legal/financial decisions,   sign important papers or drink alcohol.    ACTIVITY:  Today: no heavy lifting, straining or running due to procedural   sedation/anesthesia.  The following day: return to full activity including work.  DIET:  Eat and drink normally unless instructed otherwise.     TREATMENT FOR COMMON SIDE EFFECTS:  - Mild abdominal pain, nausea, belching, bloating or excessive gas:  rest,   eat lightly and use a heating pad.  - Sore Throat: treat with throat lozenges and/or gargle with warm salt   water.  - Because air was used during the procedure, expelling large amounts of air   from your rectum or belching is normal.  - If a bowel prep was taken, you may not have a bowel movement for 1-3 days.    This is normal.  SYMPTOMS TO WATCH FOR AND REPORT TO YOUR PHYSICIAN:  1. Abdominal pain or bloating, other than  gas cramps.  2. Chest pain.  3. Back pain.  4. Signs of infection such as: chills or fever occurring within 24 hours   after the procedure.  5. Rectal bleeding, which would show as bright red, maroon, or black stools.   (A tablespoon of blood from the rectum is not serious, especially if   hemorrhoids are present.)  6. Vomiting.  7. Weakness or dizziness.  GO DIRECTLY TO THE NEAREST EMERGENCY ROOM IF YOU HAVE ANY OF THE FOLLOWING:      Difficulty breathing              Chills and/or fever over 101 F   Persistent vomiting and/or vomiting blood   Severe abdominal pain   Severe chest pain   Black, tarry stools   Bleeding- more than one tablespoon   Any other symptom or condition that you feel may need urgent attention  Your doctor recommends these additional instructions:  If any biopsies were taken, your doctors clinic will contact you in 1 to 2   weeks with any results.  - Discharge patient to home.   - Patient has a contact number available for emergencies.  The signs and   symptoms of potential delayed complications were discussed with the   patient.  Return to normal activities tomorrow.  Written discharge   instructions were provided to the patient.   - Resume previous diet.   - Continue present medications.   - Repeat colonoscopy in 5 years for screening purposes.  For questions, problems or results please call your physician - Sam Ospina MD.  EMERGENCY PHONE NUMBER: [phone],  LAB RESULTS: [phone]  IF A COMPLICATION OR EMERGENCY SITUATION ARISES AND YOU ARE UNABLE TO REACH   YOUR PHYSICIAN - GO DIRECTLY TO THE EMERGENCY ROOM.  Sam Ospina MD  4/17/2023 1:10:30 PM  This report has been verified and signed electronically.  Dear patient,  As a result of recent federal legislation (The Federal Cures Act), you may   receive lab or pathology results from your procedure in your [facility]   account before your physician is able to contact you. Your physician or   their representative will  relay the results to you with their   recommendations at their soonest availability.  Thank you,  PROVATION

## 2023-04-17 NOTE — H&P
Short Stay Endoscopy History and Physical    PCP - Anna Dos Santos MD    Procedure - Colonoscopy  ASA - per anesthesia  Mallampati - per anesthesia  History of Anesthesia problems - no  Family history Anesthesia problems - no   Plan of anesthesia - General    HPI:  This is a 57 y.o. female here for evaluation of : family history of colon cancer (mother in 50s)      ROS:  Constitutional: No fevers, chills, No weight loss  CV: No chest pain  Pulm: No cough, No shortness of breath  GI: see HPI  Derm: No rash    Medical History:  has a past medical history of Anemia, Diverticulosis, History of gastroesophageal reflux (GERD), Hyperlipidemia, Hypertension, Stroke (2015), and Trauma (1998).    Surgical History:  has a past surgical history that includes Tubal ligation; Hysterectomy (10/2006); Bartholin gland cyst excision; and Colonoscopy.    Family History: family history includes Colon cancer (age of onset: 55) in her mother; Diabetes in her father; Hypertension in her brother, father, mother, and sister.. Otherwise no colon cancer, inflammatory bowel disease, or GI malignancies.    Social History:  reports that she has never smoked. She has never used smokeless tobacco. She reports that she does not currently use alcohol. She reports that she does not use drugs.    Review of patient's allergies indicates:   Allergen Reactions    Ace inhibitors Swelling    Benicar [olmesartan] Swelling    Methyldopa Other (See Comments)     sleepy    Clonidine Other (See Comments)     sleepy       Medications:   Medications Prior to Admission   Medication Sig Dispense Refill Last Dose    aspirin (ECOTRIN) 325 MG EC tablet Take 1 tablet (325 mg total) by mouth once daily.  0 4/17/2023    atorvastatin (LIPITOR) 20 MG tablet Take 1 tablet (20 mg total) by mouth once daily. 90 tablet 3 4/16/2023    carvediloL (COREG) 25 MG tablet Take 1 tablet (25 mg total) by mouth 2 (two) times daily with meals. 180 tablet 3 4/17/2023    NIFEdipine  (PROCARDIA-XL) 90 MG (OSM) 24 hr tablet Take 1 tablet by mouth once daily 90 tablet 3 4/17/2023    potassium chloride (KLOR-CON) 10 MEQ TbSR Take 2 tablets (20 mEq total) by mouth 2 (two) times daily. 360 tablet 0 4/16/2023    triamterene-hydrochlorothiazide 37.5-25 mg (DYAZIDE) 37.5-25 mg per capsule Take 1 capsule by mouth once daily 90 capsule 0 4/17/2023    naproxen (NAPROSYN) 500 MG tablet Take 1 tablet (500 mg total) by mouth 2 (two) times daily with meals. 60 tablet 0 Unknown         Physical Exam:    Vital Signs:   Vitals:    04/17/23 1143   BP: 133/80   Pulse: 60   Resp: 18   Temp: 98.1 °F (36.7 °C)       General Appearance: Well appearing in no acute distress  Eyes:    No scleral icterus  ENT: Neck supple, Lips, mucosa, and tongue normal; teeth and gums normal  Abdomen: Soft, non tender, non distended with positive bowel sounds. No hepatosplenomegaly, ascites, or mass.  Extremities: 2+ pulses, no clubbing, cyanosis or edema  Skin: No rash      Labs:  Lab Results   Component Value Date    WBC 5.56 03/04/2022    HGB 11.1 (L) 03/04/2022    HCT 35.3 (L) 03/04/2022     03/04/2022    CHOL 127 09/15/2020    TRIG 80 09/15/2020    HDL 43 09/15/2020    ALT 13 09/29/2020    AST 12 09/29/2020     03/04/2022    K 3.1 (L) 03/04/2022     03/04/2022    CREATININE 0.74 03/04/2022    BUN 11 03/04/2022    CO2 31 (H) 03/04/2022    TSH 0.719 09/29/2020    INR 1.0 08/09/2015    HGBA1C 5.9 (H) 03/05/2018       I have explained the risks and benefits of endoscopy procedures to the patient including but not limited to bleeding, perforation, infection, and death.  The patient was asked if they understand and allowed to ask any further questions to their satisfaction.    Sam Ospina MD

## 2023-04-17 NOTE — ANESTHESIA PREPROCEDURE EVALUATION
04/17/2023  Lori Burgess is a 57 y.o., female.      Pre-op Assessment    I have reviewed the Patient Summary Reports.     I have reviewed the Nursing Notes. I have reviewed the NPO Status.   I have reviewed the Medications.     Review of Systems  Anesthesia Hx:  Denies Family Hx of Anesthesia complications.   Denies Personal Hx of Anesthesia complications.   Cardiovascular:   Hypertension    Neurological:   CVA        Physical Exam    Airway:  Mallampati: II   Mouth Opening: Normal  Neck ROM: Normal ROM        Anesthesia Plan  Type of Anesthesia, risks & benefits discussed:    Anesthesia Type: Gen Natural Airway  Intra-op Monitoring Plan: Standard ASA Monitors  Post Op Pain Control Plan: multimodal analgesia  Induction:  IV  Informed Consent: Informed consent signed with the Patient and all parties understand the risks and agree with anesthesia plan.  All questions answered.   ASA Score: 3  Day of Surgery Review of History & Physical: H&P Update referred to the surgeon/provider.    Ready For Surgery From Anesthesia Perspective.     .

## 2023-04-17 NOTE — ANESTHESIA POSTPROCEDURE EVALUATION
Anesthesia Post Evaluation    Patient: Lori Burgess    Procedure(s) Performed: Procedure(s) (LRB):  COLONOSCOPY (N/A)    Final Anesthesia Type: general      Patient location during evaluation: PACU  Patient participation: Yes- Able to Participate  Level of consciousness: awake and alert  Post-procedure vital signs: reviewed and stable  Pain management: adequate  Airway patency: patent    PONV status at discharge: No PONV  Anesthetic complications: no      Cardiovascular status: stable  Respiratory status: spontaneous ventilation  Hydration status: euvolemic  Follow-up not needed.          Vitals Value Taken Time   /82 04/17/23 1337   Temp 36.8 °C (98.2 °F) 04/17/23 1315   Pulse 59 04/17/23 1337   Resp 20 04/17/23 1337   SpO2 100 % 04/17/23 1337         No case tracking events are documented in the log.      Pain/Avery Score: Avery Score: 9 (4/17/2023  1:38 PM)

## 2023-04-17 NOTE — TRANSFER OF CARE
"Anesthesia Transfer of Care Note    Patient: Lori Burgess    Procedure(s) Performed: Procedure(s) (LRB):  COLONOSCOPY (N/A)    Patient location: GI    Anesthesia Type: general    Transport from OR: Transported from OR on room air with adequate spontaneous ventilation    Post pain: adequate analgesia    Post assessment: no apparent anesthetic complications and tolerated procedure well    Post vital signs: stable    Level of consciousness: awake    Nausea/Vomiting: no nausea/vomiting    Complications: none    Transfer of care protocol was followed      Last vitals:   Visit Vitals  /80   Pulse 60   Temp 36.7 °C (98.1 °F)   Resp 18   Ht 5' 11" (1.803 m)   Wt 99.8 kg (220 lb)   LMP  (LMP Unknown)   SpO2 100%   Breastfeeding No   BMI 30.68 kg/m²     "

## 2023-04-18 VITALS
OXYGEN SATURATION: 100 % | WEIGHT: 220 LBS | DIASTOLIC BLOOD PRESSURE: 82 MMHG | BODY MASS INDEX: 30.8 KG/M2 | TEMPERATURE: 98 F | HEIGHT: 71 IN | HEART RATE: 59 BPM | SYSTOLIC BLOOD PRESSURE: 148 MMHG | RESPIRATION RATE: 20 BRPM

## 2023-05-19 ENCOUNTER — LAB VISIT (OUTPATIENT)
Dept: LAB | Facility: HOSPITAL | Age: 58
End: 2023-05-19
Attending: INTERNAL MEDICINE
Payer: COMMERCIAL

## 2023-05-19 DIAGNOSIS — R73.03 PRE-DIABETES: ICD-10-CM

## 2023-05-19 DIAGNOSIS — I10 ESSENTIAL HYPERTENSION: ICD-10-CM

## 2023-05-19 DIAGNOSIS — E78.5 HYPERLIPIDEMIA, UNSPECIFIED HYPERLIPIDEMIA TYPE: ICD-10-CM

## 2023-05-19 LAB
ALBUMIN SERPL BCP-MCNC: 3.5 G/DL (ref 3.5–5.2)
ALP SERPL-CCNC: 82 U/L (ref 55–135)
ALT SERPL W/O P-5'-P-CCNC: 14 U/L (ref 10–44)
ANION GAP SERPL CALC-SCNC: 9 MMOL/L (ref 8–16)
AST SERPL-CCNC: 13 U/L (ref 10–40)
BASOPHILS # BLD AUTO: 0.03 K/UL (ref 0–0.2)
BASOPHILS NFR BLD: 0.6 % (ref 0–1.9)
BILIRUB SERPL-MCNC: 0.7 MG/DL (ref 0.1–1)
BUN SERPL-MCNC: 13 MG/DL (ref 6–20)
CALCIUM SERPL-MCNC: 9.6 MG/DL (ref 8.7–10.5)
CHLORIDE SERPL-SCNC: 102 MMOL/L (ref 95–110)
CHOLEST SERPL-MCNC: 136 MG/DL (ref 120–199)
CHOLEST/HDLC SERPL: 2.9 {RATIO} (ref 2–5)
CO2 SERPL-SCNC: 31 MMOL/L (ref 23–29)
CREAT SERPL-MCNC: 0.8 MG/DL (ref 0.5–1.4)
DIFFERENTIAL METHOD: ABNORMAL
EOSINOPHIL # BLD AUTO: 0.1 K/UL (ref 0–0.5)
EOSINOPHIL NFR BLD: 1.7 % (ref 0–8)
ERYTHROCYTE [DISTWIDTH] IN BLOOD BY AUTOMATED COUNT: 16.1 % (ref 11.5–14.5)
EST. GFR  (NO RACE VARIABLE): >60 ML/MIN/1.73 M^2
ESTIMATED AVG GLUCOSE: 120 MG/DL (ref 68–131)
GLUCOSE SERPL-MCNC: 105 MG/DL (ref 70–110)
HBA1C MFR BLD: 5.8 % (ref 4–5.6)
HCT VFR BLD AUTO: 37.2 % (ref 37–48.5)
HDLC SERPL-MCNC: 47 MG/DL (ref 40–75)
HDLC SERPL: 34.6 % (ref 20–50)
HGB BLD-MCNC: 11.8 G/DL (ref 12–16)
IMM GRANULOCYTES # BLD AUTO: 0.01 K/UL (ref 0–0.04)
IMM GRANULOCYTES NFR BLD AUTO: 0.2 % (ref 0–0.5)
LDLC SERPL CALC-MCNC: 76.8 MG/DL (ref 63–159)
LYMPHOCYTES # BLD AUTO: 1.6 K/UL (ref 1–4.8)
LYMPHOCYTES NFR BLD: 30.2 % (ref 18–48)
MCH RBC QN AUTO: 24.3 PG (ref 27–31)
MCHC RBC AUTO-ENTMCNC: 31.7 G/DL (ref 32–36)
MCV RBC AUTO: 77 FL (ref 82–98)
MONOCYTES # BLD AUTO: 0.4 K/UL (ref 0.3–1)
MONOCYTES NFR BLD: 8.1 % (ref 4–15)
NEUTROPHILS # BLD AUTO: 3.1 K/UL (ref 1.8–7.7)
NEUTROPHILS NFR BLD: 59.2 % (ref 38–73)
NONHDLC SERPL-MCNC: 89 MG/DL
NRBC BLD-RTO: 0 /100 WBC
PLATELET # BLD AUTO: 242 K/UL (ref 150–450)
PMV BLD AUTO: 11 FL (ref 9.2–12.9)
POTASSIUM SERPL-SCNC: 3.2 MMOL/L (ref 3.5–5.1)
PROT SERPL-MCNC: 7.8 G/DL (ref 6–8.4)
RBC # BLD AUTO: 4.86 M/UL (ref 4–5.4)
SODIUM SERPL-SCNC: 142 MMOL/L (ref 136–145)
TRIGL SERPL-MCNC: 61 MG/DL (ref 30–150)
TSH SERPL DL<=0.005 MIU/L-ACNC: 1.44 UIU/ML (ref 0.4–4)
WBC # BLD AUTO: 5.29 K/UL (ref 3.9–12.7)

## 2023-05-19 PROCEDURE — 80053 COMPREHEN METABOLIC PANEL: CPT | Performed by: INTERNAL MEDICINE

## 2023-05-19 PROCEDURE — 84443 ASSAY THYROID STIM HORMONE: CPT | Performed by: INTERNAL MEDICINE

## 2023-05-19 PROCEDURE — 80061 LIPID PANEL: CPT | Performed by: INTERNAL MEDICINE

## 2023-05-19 PROCEDURE — 36415 COLL VENOUS BLD VENIPUNCTURE: CPT | Performed by: INTERNAL MEDICINE

## 2023-05-19 PROCEDURE — 83036 HEMOGLOBIN GLYCOSYLATED A1C: CPT | Performed by: INTERNAL MEDICINE

## 2023-05-19 PROCEDURE — 85025 COMPLETE CBC W/AUTO DIFF WBC: CPT | Performed by: INTERNAL MEDICINE

## 2023-05-23 ENCOUNTER — OFFICE VISIT (OUTPATIENT)
Dept: FAMILY MEDICINE | Facility: CLINIC | Age: 58
End: 2023-05-23
Payer: COMMERCIAL

## 2023-05-23 VITALS
HEART RATE: 98 BPM | WEIGHT: 235.25 LBS | HEIGHT: 71 IN | DIASTOLIC BLOOD PRESSURE: 82 MMHG | BODY MASS INDEX: 32.94 KG/M2 | SYSTOLIC BLOOD PRESSURE: 142 MMHG | OXYGEN SATURATION: 98 %

## 2023-05-23 DIAGNOSIS — I10 PRIMARY HYPERTENSION: Primary | ICD-10-CM

## 2023-05-23 DIAGNOSIS — R73.03 PRE-DIABETES: ICD-10-CM

## 2023-05-23 DIAGNOSIS — E87.6 DIURETIC-INDUCED HYPOKALEMIA: ICD-10-CM

## 2023-05-23 DIAGNOSIS — Z12.39 ENCOUNTER FOR SCREENING FOR MALIGNANT NEOPLASM OF BREAST, UNSPECIFIED SCREENING MODALITY: ICD-10-CM

## 2023-05-23 DIAGNOSIS — D64.9 ANEMIA, UNSPECIFIED TYPE: ICD-10-CM

## 2023-05-23 DIAGNOSIS — R60.9 EDEMA, UNSPECIFIED TYPE: ICD-10-CM

## 2023-05-23 DIAGNOSIS — T50.2X5A DIURETIC-INDUCED HYPOKALEMIA: ICD-10-CM

## 2023-05-23 DIAGNOSIS — E78.5 HYPERLIPIDEMIA, UNSPECIFIED HYPERLIPIDEMIA TYPE: ICD-10-CM

## 2023-05-23 PROCEDURE — 99214 PR OFFICE/OUTPT VISIT, EST, LEVL IV, 30-39 MIN: ICD-10-PCS | Mod: S$GLB,,, | Performed by: INTERNAL MEDICINE

## 2023-05-23 PROCEDURE — 1159F PR MEDICATION LIST DOCUMENTED IN MEDICAL RECORD: ICD-10-PCS | Mod: CPTII,S$GLB,, | Performed by: INTERNAL MEDICINE

## 2023-05-23 PROCEDURE — 3077F PR MOST RECENT SYSTOLIC BLOOD PRESSURE >= 140 MM HG: ICD-10-PCS | Mod: CPTII,S$GLB,, | Performed by: INTERNAL MEDICINE

## 2023-05-23 PROCEDURE — 1160F RVW MEDS BY RX/DR IN RCRD: CPT | Mod: CPTII,S$GLB,, | Performed by: INTERNAL MEDICINE

## 2023-05-23 PROCEDURE — 3008F BODY MASS INDEX DOCD: CPT | Mod: CPTII,S$GLB,, | Performed by: INTERNAL MEDICINE

## 2023-05-23 PROCEDURE — 3008F PR BODY MASS INDEX (BMI) DOCUMENTED: ICD-10-PCS | Mod: CPTII,S$GLB,, | Performed by: INTERNAL MEDICINE

## 2023-05-23 PROCEDURE — 1159F MED LIST DOCD IN RCRD: CPT | Mod: CPTII,S$GLB,, | Performed by: INTERNAL MEDICINE

## 2023-05-23 PROCEDURE — 3044F PR MOST RECENT HEMOGLOBIN A1C LEVEL <7.0%: ICD-10-PCS | Mod: CPTII,S$GLB,, | Performed by: INTERNAL MEDICINE

## 2023-05-23 PROCEDURE — 1160F PR REVIEW ALL MEDS BY PRESCRIBER/CLIN PHARMACIST DOCUMENTED: ICD-10-PCS | Mod: CPTII,S$GLB,, | Performed by: INTERNAL MEDICINE

## 2023-05-23 PROCEDURE — 99999 PR PBB SHADOW E&M-EST. PATIENT-LVL III: ICD-10-PCS | Mod: PBBFAC,,, | Performed by: INTERNAL MEDICINE

## 2023-05-23 PROCEDURE — 3077F SYST BP >= 140 MM HG: CPT | Mod: CPTII,S$GLB,, | Performed by: INTERNAL MEDICINE

## 2023-05-23 PROCEDURE — 99999 PR PBB SHADOW E&M-EST. PATIENT-LVL III: CPT | Mod: PBBFAC,,, | Performed by: INTERNAL MEDICINE

## 2023-05-23 PROCEDURE — 3079F PR MOST RECENT DIASTOLIC BLOOD PRESSURE 80-89 MM HG: ICD-10-PCS | Mod: CPTII,S$GLB,, | Performed by: INTERNAL MEDICINE

## 2023-05-23 PROCEDURE — 99214 OFFICE O/P EST MOD 30 MIN: CPT | Mod: S$GLB,,, | Performed by: INTERNAL MEDICINE

## 2023-05-23 PROCEDURE — 3079F DIAST BP 80-89 MM HG: CPT | Mod: CPTII,S$GLB,, | Performed by: INTERNAL MEDICINE

## 2023-05-23 PROCEDURE — 3044F HG A1C LEVEL LT 7.0%: CPT | Mod: CPTII,S$GLB,, | Performed by: INTERNAL MEDICINE

## 2023-05-23 RX ORDER — POTASSIUM CHLORIDE 1500 MG/1
20 TABLET, EXTENDED RELEASE ORAL 2 TIMES DAILY
Qty: 60 TABLET | Refills: 3 | Status: SHIPPED | OUTPATIENT
Start: 2023-05-23 | End: 2023-09-01

## 2023-05-23 NOTE — PROGRESS NOTES
Subjective:       Patient ID: Lori Burgess is a 57 y.o. female.    Chief Complaint: Hypertension      HPI  Lori Burgess is a 57 y.o. female with history of hypertension, hyperlipidemia, prediabetes, and history of stroke (presented with left-sided weakness but resolved) who presents today for Hypertension     Patient reports feels okay.  Compliant with medications.  Tends to have lower extremity swelling, which has been present for a long time.  In the morning legs usually have no swelling but at the end of the day are both edematous. Takes Dyazide in the morning but goes to the bathroom all day and even multiple times at night.  Blood pressure usually well controlled at home with systolic and 130s. Checks her blood pressure at home and her systolic is usually in the 130s.    Recently was picking up  a heavy package and next morning notice right wrist swelling.  Is uncomfortable but not weak.  Started using a brace which  feels is helping.  Denies use of anti-inflammatories.     Labs with mild improvement of her anemia.  Persist with low potassium.  Diet is not the best and sometimes eats once a day.      Has no toxic habits.     Declines vaccines.    Health Maintenance:  Health Maintenance   Topic Date Due    TETANUS VACCINE  Never done    Mammogram  07/18/2023    High Dose Statin  04/17/2024    Lipid Panel  05/19/2024    Hepatitis C Screening  Completed       Review of Systems   Constitutional: Negative.  Negative for fever and unexpected weight change.   HENT: Negative.     Respiratory: Negative.     Cardiovascular:  Positive for leg swelling.   Gastrointestinal: Negative.    Genitourinary:  Negative for difficulty urinating.   Musculoskeletal:  Positive for arthralgias (right wrist) and joint swelling (right wrist).   Integumentary:  Negative.   Neurological: Negative.    Psychiatric/Behavioral: Negative.      Past Medical History:   Diagnosis Date    Anemia     Diverticulosis     History of  gastroesophageal reflux (GERD)     Hyperlipidemia     Hypertension     Stroke 2015    Trauma 1998    Fractured Right ankle       Past Surgical History:   Procedure Laterality Date    BARTHOLIN GLAND CYST EXCISION      Marsupialization, not excision    COLONOSCOPY      COLONOSCOPY N/A 4/17/2023    Procedure: COLONOSCOPY;  Surgeon: Sam Ospina MD;  Location: [facility];  Service: Endoscopy;  Laterality: N/A;    HYSTERECTOMY  10/2006    Laparoscopic Supracervical Hysterectomy    TUBAL LIGATION         Family History   Problem Relation Age of Onset    Colon cancer Mother 55    Hypertension Mother     Diabetes Father     Hypertension Father     Hypertension Brother     Hypertension Sister     Heart disease Neg Hx     Heart attack Neg Hx     Breast cancer Neg Hx     Ovarian cancer Neg Hx        Social History     Socioeconomic History    Marital status: Single   Occupational History     Employer: [employer]   Tobacco Use    Smoking status: Never    Smokeless tobacco: Never   Substance and Sexual Activity    Alcohol use: Not Currently    Drug use: No    Sexual activity: Not Currently     Birth control/protection: None, Condom, See Surgical Hx     Comment: Hysterectomy     Social Determinants of Health     Financial Resource Strain: Medium Risk    Difficulty of Paying Living Expenses: Somewhat hard   Food Insecurity: Food Insecurity Present    Worried About Running Out of Food in the Last Year: Sometimes true    Ran Out of Food in the Last Year: Sometimes true   Transportation Needs: No Transportation Needs    Lack of Transportation (Medical): No    Lack of Transportation (Non-Medical): No   Physical Activity: Unknown    Days of Exercise per Week: 6 days   Stress: No Stress Concern Present    Feeling of Stress : Not at all   Social Connections: Unknown    Frequency of Communication with Friends and Family: More than three times a week    Frequency of Social Gatherings with Friends and Family: More  than three times a week    Active Member of Clubs or Organizations: No    Attends Club or Organization Meetings: Never    Marital Status:    Housing Stability: High Risk    Unable to Pay for Housing in the Last Year: Yes    Number of Places Lived in the Last Year: 1    Unstable Housing in the Last Year: No       Current Outpatient Medications   Medication Sig Dispense Refill    atorvastatin (LIPITOR) 20 MG tablet Take 1 tablet (20 mg total) by mouth once daily. 90 tablet 3    carvediloL (COREG) 25 MG tablet Take 1 tablet (25 mg total) by mouth 2 (two) times daily with meals. 180 tablet 3    naproxen (NAPROSYN) 500 MG tablet Take 1 tablet (500 mg total) by mouth 2 (two) times daily with meals. 60 tablet 0    NIFEdipine (PROCARDIA-XL) 90 MG (OSM) 24 hr tablet Take 1 tablet by mouth once daily 90 tablet 3    potassium chloride (KLOR-CON) 10 MEQ TbSR Take 2 tablets (20 mEq total) by mouth 2 (two) times daily. 360 tablet 0    triamterene-hydrochlorothiazide 37.5-25 mg (DYAZIDE) 37.5-25 mg per capsule Take 1 capsule by mouth once daily 90 capsule 0    aspirin (ECOTRIN) 325 MG EC tablet Take 1 tablet (325 mg total) by mouth once daily.  0     No current facility-administered medications for this visit.       Review of patient's allergies indicates:   Allergen Reactions    Ace inhibitors Swelling    Benicar [olmesartan] Swelling    Methyldopa Other (See Comments)     sleepy    Clonidine Other (See Comments)     sleepy         Objective:       Last 3 sets of Vitals    Vitals - 1 value per visit 4/18/2023 5/23/2023 5/23/2023   SYSTOLIC - - 142   DIASTOLIC - - 82   Pulse - - 98   Temp - - -   Resp - - -   SPO2 - - 98   Weight (lb) - - 235.23   Weight (kg) - - 106.7   Height - - 71   BMI (Calculated) - - 32.8   VISIT REPORT - - -   Pain Score  0 0 -   Some recent data might be hidden   Physical Exam  Constitutional:       General: She is not in acute distress.  HENT:      Head: Normocephalic.      Right Ear: Tympanic  membrane, ear canal and external ear normal.      Left Ear: Tympanic membrane, ear canal and external ear normal.      Nose: Nose normal.      Mouth/Throat:      Mouth: Mucous membranes are moist.   Eyes:      General: No scleral icterus.     Extraocular Movements: Extraocular movements intact.      Conjunctiva/sclera: Conjunctivae normal.   Neck:      Vascular: No carotid bruit.      Comments: No goiter.  Cardiovascular:      Rate and Rhythm: Normal rate and regular rhythm.      Pulses: Normal pulses.      Heart sounds: Normal heart sounds.   Pulmonary:      Effort: Pulmonary effort is normal.      Breath sounds: Normal breath sounds.   Abdominal:      General: Bowel sounds are normal. There is no distension.      Palpations: Abdomen is soft. There is no mass.      Tenderness: There is no abdominal tenderness.   Musculoskeletal:         General: Swelling (bilateral distal lower extremities) present.   Lymphadenopathy:      Cervical: No cervical adenopathy.   Skin:     General: Skin is warm and dry.   Neurological:      General: No focal deficit present.      Mental Status: She is alert and oriented to person, place, and time.   Psychiatric:         Mood and Affect: Mood normal.         Behavior: Behavior normal.         CBC:  Recent Labs   Lab 09/29/20  1010 03/04/22  1446 05/19/23  0655   WBC 5.13 5.56 5.29   RBC 4.62 4.61 4.86   Hemoglobin 11.1 L 11.1 L 11.8 L   Hematocrit 36.8 L 35.3 L 37.2   Platelets 249 239 242   MCV 80 L 77 L 77 L   MCH 24.0 L 24.1 L 24.3 L   MCHC 30.2 L 31.4 L 31.7 L     CMP:  Recent Labs   Lab 09/10/20  0953 09/15/20  0842 09/29/20  1010 03/04/22  1446 05/19/23  0655   Glucose 117 H   < > 85   < > 105   Calcium 9.4   < > 9.5   < > 9.6   Albumin 3.5  --  3.4 L  --  3.5   Total Protein 7.5  --  7.6  --  7.8   Sodium 143   < > 142   < > 142   Potassium 3.1 L   < > 3.6   < > 3.2 L   CO2 30 H   < > 32 H   < > 31 H   Chloride 102   < > 102   < > 102   BUN 23 H   < > 15   < > 13   Creatinine 1.0    < > 1.0   < > 0.8   Alkaline Phosphatase 76  --  91  --  82   ALT 15  --  13  --  14   AST 16  --  12  --  13   Total Bilirubin 0.6  --  0.8  --  0.7    < > = values in this interval not displayed.     URINALYSIS:  Recent Labs   Lab 09/10/20  0931 09/15/20  0918 09/29/20  0954   Color, UA Yael Yellow Yellow   Specific Gravity, UA 1.030 1.020 1.025   pH, UA 5.0 7.0 6.0   Protein, UA 1+ A Trace A 1+ A   Bacteria Occasional Moderate A Rare   Nitrite, UA Negative Negative Negative   Leukocytes, UA 1+ A 3+ A 2+ A   Urobilinogen, UA  --  1.0  --    Hyaline Casts, UA 5 A  --  0      LIPIDS:  Recent Labs   Lab 09/15/20  0842 09/29/20  1010 05/19/23  0655   TSH  --  0.719 1.444   HDL 43  --  47   Cholesterol 127  --  136   Triglycerides 80  --  61   LDL Cholesterol 68.0  --  76.8   HDL/Cholesterol Ratio 33.9  --  34.6   Non-HDL Cholesterol 84  --  89   Total Cholesterol/HDL Ratio 3.0  --  2.9     TSH:  Recent Labs   Lab 09/29/20  1010 05/19/23  0655   TSH 0.719 1.444       A1C:  Recent Labs   Lab 05/19/23  0655   Hemoglobin A1C 5.8 H       Imaging:  US Lower Extremity Veins Bilateral  Narrative: EXAMINATION:  US LOWER EXTREMITY VEINS BILATERAL    CLINICAL HISTORY:  Pain in left knee    TECHNIQUE:  Duplex and color flow Doppler and dynamic compression was performed of the bilateral lower extremity veins was performed.    COMPARISON:  None    FINDINGS:  Right thigh veins: The common femoral, femoral, popliteal, upper greater saphenous, and deep femoral veins are patent and free of thrombus. The veins are normally compressible and have normal phasic flow and augmentation response.    Right calf veins: The visualized calf veins are patent.    Left thigh veins: The common femoral, femoral, popliteal, upper greater saphenous, and deep femoral veins are patent and free of thrombus. The veins are normally compressible and have normal phasic flow and augmentation response.    Left calf veins: The visualized calf veins are  patent.    Miscellaneous: Anechoic, avascular right popliteal cystic lesion measuring 3.3 x 1.2 x 2.5 cm compatible popliteal fossa/Baker's cyst.  Impression: No evidence of deep venous thrombosis in either lower extremity.    Suggested right popliteal fossa Baker's cyst.    Electronically signed by: Maurizoi Sevilla  Date:    08/14/2022  Time:    11:48  X-Ray Knee 3 View Right  Narrative: EXAMINATION:  XR KNEE 3 VIEW RIGHT    CLINICAL HISTORY:  Pain in right knee    TECHNIQUE:  AP, lateral, and fibular views of the right knee were performed.    COMPARISON:  None    FINDINGS:  Medial compartment joint space narrowing with subchondral sclerosis and small marginal osteophyte formation.  Minimal dorsal spur from the patella.  Small quadriceps enthesophyte.  No fracture or dislocation.  No joint effusion.  Impression: Degenerative change without fracture or dislocation.    Electronically signed by: Meenakshi Loco MD  Date:    08/14/2022  Time:    10:24      Assessment:       1. Primary hypertension    2. Diuretic-induced hypokalemia    3. Edema, unspecified type    4. Pre-diabetes    5. Hyperlipidemia, unspecified hyperlipidemia type    6. Anemia, unspecified type    7. Encounter for screening for malignant neoplasm of breast, unspecified screening modality            Plan:       1. Primary hypertension  -     Comprehensive Metabolic Panel; Future; Expected date: 05/23/2023  - mildly elevated.  Reports have been stable and no symptoms of hypertension.  - Encourage diet and exercise.  Continue to monitor.    2. Diuretic-induced hypokalemia  -     potassium chloride (K-TAB) 20 mEq; Take 1 tablet (20 mEq total) by mouth 2 (two) times daily.  Dispense: 60 tablet; Refill: 3  -     Comprehensive Metabolic Panel; Future; Expected date: 05/23/2023  -     MAGNESIUM; Future; Expected date: 05/23/2023    3. Edema, unspecified type  -     Comprehensive Metabolic Panel; Future; Expected date: 05/23/2023  - reports is chronic.   Possibly associated to medications for blood pressure.  Optimize diet.  Compression stockings recommended.    4. Pre-diabetes  -     Comprehensive Metabolic Panel; Future; Expected date: 05/23/2023  - healthy diet with decrease refined carbs and weight loss.  Exercise as tolerated.    5. Hyperlipidemia, unspecified hyperlipidemia type   - lipid profile looks good.  Continue atorvastatin.    6. Anemia, unspecified type   - improving.  Continue to monitor.    7. Encounter for screening for malignant neoplasm of breast, unspecified screening modality  -     Mammo Digital Screening Bilat w/ Nando; Future; Expected date: 05/23/2023       Health Maintenance Due   Topic Date Due    Pneumococcal Vaccines (Age 0-64) (1 - PCV) Never done    TETANUS VACCINE  Never done    Shingles Vaccine (1 of 2) Never done    COVID-19 Vaccine (3 - Booster for Pfizer series) 06/25/2021    Mammogram  07/18/2023            Return to clinic in 3-6 months.    Anna Dos Santos MD  [facility]  Disclaimer:  This note has been generated using voice-recognition software. There may be grammatical or spelling errors that have been missed during proof-reading

## 2023-05-29 DIAGNOSIS — I10 ESSENTIAL HYPERTENSION: ICD-10-CM

## 2023-05-30 RX ORDER — TRIAMTERENE AND HYDROCHLOROTHIAZIDE 37.5; 25 MG/1; MG/1
1 CAPSULE ORAL DAILY
Qty: 90 CAPSULE | Refills: 0 | Status: SHIPPED | OUTPATIENT
Start: 2023-05-30 | End: 2023-09-01

## 2023-05-30 NOTE — TELEPHONE ENCOUNTER
Refill Routing Note   Medication(s) are not appropriate for processing by [facility] for the following reason(s):      Required labs abnormal  Required vitals abnormal    [facility] action(s):  Defer None identified          Appointments  past 12m or future 3m with PCP    Date Provider   Last Visit   5/23/2023 Anna Dos Santos MD   Next Visit   9/1/2023 Anna Dos Santos MD   ED visits in past 90 days: 0        Note composed:9:11 AM 05/30/2023

## 2023-05-30 NOTE — TELEPHONE ENCOUNTER
No care due was identified.  [facility] Embedded Care Due Messages. Reference number: [id number].   5/29/2023 10:45:47 PM CDT

## 2023-06-06 ENCOUNTER — CLINICAL SUPPORT (OUTPATIENT)
Dept: FAMILY MEDICINE | Facility: CLINIC | Age: 58
End: 2023-06-06
Payer: COMMERCIAL

## 2023-06-06 VITALS — DIASTOLIC BLOOD PRESSURE: 84 MMHG | SYSTOLIC BLOOD PRESSURE: 136 MMHG

## 2023-06-06 NOTE — PROGRESS NOTES
"Patient came in for BP check. States that she has been taking her medication regularly and checking pressure at home, which has been "the same." In clinic today, BP is 136/84.    "

## 2023-07-19 ENCOUNTER — TELEPHONE (OUTPATIENT)
Dept: FAMILY MEDICINE | Facility: CLINIC | Age: 58
End: 2023-07-19
Payer: COMMERCIAL

## 2023-08-01 ENCOUNTER — HOSPITAL ENCOUNTER (OUTPATIENT)
Dept: RADIOLOGY | Facility: HOSPITAL | Age: 58
Discharge: HOME | End: 2023-08-01
Attending: INTERNAL MEDICINE
Payer: COMMERCIAL

## 2023-08-01 DIAGNOSIS — Z12.39 ENCOUNTER FOR SCREENING FOR MALIGNANT NEOPLASM OF BREAST, UNSPECIFIED SCREENING MODALITY: ICD-10-CM

## 2023-08-01 PROCEDURE — 77063 MAMMO DIGITAL SCREENING BILAT WITH TOMO: ICD-10-PCS | Mod: 26,,, | Performed by: RADIOLOGY

## 2023-08-01 PROCEDURE — 77067 MAMMO DIGITAL SCREENING BILAT WITH TOMO: ICD-10-PCS | Mod: 26,,, | Performed by: RADIOLOGY

## 2023-08-01 PROCEDURE — 77067 SCR MAMMO BI INCL CAD: CPT | Mod: TC

## 2023-08-01 PROCEDURE — 77063 BREAST TOMOSYNTHESIS BI: CPT | Mod: 26,,, | Performed by: RADIOLOGY

## 2023-08-01 PROCEDURE — 77067 SCR MAMMO BI INCL CAD: CPT | Mod: 26,,, | Performed by: RADIOLOGY

## 2023-08-04 ENCOUNTER — HOSPITAL ENCOUNTER (EMERGENCY)
Facility: HOSPITAL | Age: 58
Discharge: HOME | End: 2023-08-04
Attending: EMERGENCY MEDICINE
Payer: COMMERCIAL

## 2023-08-04 VITALS
WEIGHT: 200 LBS | HEART RATE: 68 BPM | DIASTOLIC BLOOD PRESSURE: 87 MMHG | SYSTOLIC BLOOD PRESSURE: 141 MMHG | TEMPERATURE: 98 F | RESPIRATION RATE: 16 BRPM | BODY MASS INDEX: 27.89 KG/M2 | OXYGEN SATURATION: 99 %

## 2023-08-04 DIAGNOSIS — R10.32 LEFT GROIN PAIN: Primary | ICD-10-CM

## 2023-08-04 LAB
ALBUMIN SERPL BCP-MCNC: 3.7 G/DL (ref 3.5–5.2)
ALP SERPL-CCNC: 95 U/L (ref 55–135)
ALT SERPL W/O P-5'-P-CCNC: 16 U/L (ref 10–44)
ANION GAP SERPL CALC-SCNC: 11 MMOL/L (ref 8–16)
AST SERPL-CCNC: 13 U/L (ref 10–40)
BASOPHILS # BLD AUTO: 0.03 K/UL (ref 0–0.2)
BASOPHILS NFR BLD: 0.5 % (ref 0–1.9)
BILIRUB SERPL-MCNC: 0.5 MG/DL (ref 0.1–1)
BILIRUB UR QL STRIP: NEGATIVE
BUN SERPL-MCNC: 20 MG/DL (ref 6–20)
CALCIUM SERPL-MCNC: 9.6 MG/DL (ref 8.7–10.5)
CHLORIDE SERPL-SCNC: 105 MMOL/L (ref 95–110)
CLARITY UR: CLEAR
CO2 SERPL-SCNC: 25 MMOL/L (ref 23–29)
COLOR UR: YELLOW
CREAT SERPL-MCNC: 1 MG/DL (ref 0.5–1.4)
DIFFERENTIAL METHOD: ABNORMAL
EOSINOPHIL # BLD AUTO: 0.1 K/UL (ref 0–0.5)
EOSINOPHIL NFR BLD: 1.6 % (ref 0–8)
ERYTHROCYTE [DISTWIDTH] IN BLOOD BY AUTOMATED COUNT: 16.1 % (ref 11.5–14.5)
EST. GFR  (NO RACE VARIABLE): >60 ML/MIN/1.73 M^2
GLUCOSE SERPL-MCNC: 100 MG/DL (ref 70–110)
GLUCOSE UR QL STRIP: NEGATIVE
HCT VFR BLD AUTO: 35.9 % (ref 37–48.5)
HGB BLD-MCNC: 11.4 G/DL (ref 12–16)
HGB UR QL STRIP: NEGATIVE
IMM GRANULOCYTES # BLD AUTO: 0.01 K/UL (ref 0–0.04)
IMM GRANULOCYTES NFR BLD AUTO: 0.2 % (ref 0–0.5)
KETONES UR QL STRIP: NEGATIVE
LEUKOCYTE ESTERASE UR QL STRIP: NEGATIVE
LYMPHOCYTES # BLD AUTO: 1.8 K/UL (ref 1–4.8)
LYMPHOCYTES NFR BLD: 28.2 % (ref 18–48)
MCH RBC QN AUTO: 24.3 PG (ref 27–31)
MCHC RBC AUTO-ENTMCNC: 31.8 G/DL (ref 32–36)
MCV RBC AUTO: 77 FL (ref 82–98)
MONOCYTES # BLD AUTO: 0.6 K/UL (ref 0.3–1)
MONOCYTES NFR BLD: 9.6 % (ref 4–15)
NEUTROPHILS # BLD AUTO: 3.9 K/UL (ref 1.8–7.7)
NEUTROPHILS NFR BLD: 59.9 % (ref 38–73)
NITRITE UR QL STRIP: NEGATIVE
NRBC BLD-RTO: 0 /100 WBC
PH UR STRIP: 6 [PH] (ref 5–8)
PLATELET # BLD AUTO: 240 K/UL (ref 150–450)
PMV BLD AUTO: 10.9 FL (ref 9.2–12.9)
POTASSIUM SERPL-SCNC: 3.3 MMOL/L (ref 3.5–5.1)
PROT SERPL-MCNC: 7.9 G/DL (ref 6–8.4)
PROT UR QL STRIP: ABNORMAL
RBC # BLD AUTO: 4.69 M/UL (ref 4–5.4)
SODIUM SERPL-SCNC: 141 MMOL/L (ref 136–145)
SP GR UR STRIP: 1.02 (ref 1–1.03)
URN SPEC COLLECT METH UR: ABNORMAL
UROBILINOGEN UR STRIP-ACNC: NEGATIVE EU/DL
WBC # BLD AUTO: 6.45 K/UL (ref 3.9–12.7)

## 2023-08-04 PROCEDURE — 81003 URINALYSIS AUTO W/O SCOPE: CPT | Performed by: EMERGENCY MEDICINE

## 2023-08-04 PROCEDURE — 25500020 PHARM REV CODE 255: Performed by: EMERGENCY MEDICINE

## 2023-08-04 PROCEDURE — 63600175 PHARM REV CODE 636 W HCPCS: Performed by: EMERGENCY MEDICINE

## 2023-08-04 PROCEDURE — 99285 EMERGENCY DEPT VISIT HI MDM: CPT | Mod: 25

## 2023-08-04 PROCEDURE — 96374 THER/PROPH/DIAG INJ IV PUSH: CPT

## 2023-08-04 PROCEDURE — 80053 COMPREHEN METABOLIC PANEL: CPT | Performed by: EMERGENCY MEDICINE

## 2023-08-04 PROCEDURE — 85025 COMPLETE CBC W/AUTO DIFF WBC: CPT | Performed by: EMERGENCY MEDICINE

## 2023-08-04 RX ORDER — ONDANSETRON 2 MG/ML
4 INJECTION INTRAMUSCULAR; INTRAVENOUS
Status: DISCONTINUED | OUTPATIENT
Start: 2023-08-04 | End: 2023-08-05 | Stop reason: HOSPADM

## 2023-08-04 RX ORDER — MORPHINE SULFATE 4 MG/ML
4 INJECTION, SOLUTION INTRAMUSCULAR; INTRAVENOUS
Status: DISCONTINUED | OUTPATIENT
Start: 2023-08-04 | End: 2023-08-05 | Stop reason: HOSPADM

## 2023-08-04 RX ORDER — KETOROLAC TROMETHAMINE 30 MG/ML
15 INJECTION, SOLUTION INTRAMUSCULAR; INTRAVENOUS
Status: COMPLETED | OUTPATIENT
Start: 2023-08-04 | End: 2023-08-04

## 2023-08-04 RX ORDER — ORPHENADRINE CITRATE 100 MG/1
100 TABLET, EXTENDED RELEASE ORAL 2 TIMES DAILY
Qty: 10 TABLET | Refills: 0 | Status: SHIPPED | OUTPATIENT
Start: 2023-08-04

## 2023-08-04 RX ORDER — NAPROXEN 500 MG/1
500 TABLET ORAL 2 TIMES DAILY WITH MEALS
Qty: 10 TABLET | Refills: 0 | Status: SHIPPED | OUTPATIENT
Start: 2023-08-04

## 2023-08-04 RX ADMIN — KETOROLAC TROMETHAMINE 15 MG: 30 INJECTION INTRAMUSCULAR; INTRAVENOUS at 08:08

## 2023-08-04 RX ADMIN — IOHEXOL 100 ML: 350 INJECTION, SOLUTION INTRAVENOUS at 09:08

## 2023-08-04 NOTE — Clinical Note
"Lori"Tommy Burgess was seen and treated in our emergency department on 8/4/2023.  She may return to work on 08/08/2023.       If you have any questions or concerns, please don't hesitate to call.      Coral Addison RN    "

## 2023-08-04 NOTE — ED TRIAGE NOTES
Pt arrived with c/o L hip pain x 2 days.  Pt denies any recent falls or trauma.  PT denies any previous injury to site.  Pain is non-radiating and sharp.  Pt took naproxen with no relief.  Walking makes pain worse.  Pt denies any numbness or tingling.  Pt answering questions appropriately, speaking in complete sentences, respirations even and unlabored.  Aao x 4.

## 2023-08-05 NOTE — DISCHARGE INSTRUCTIONS
1.)  Speak to your primary care provider about further workup of your osteoarthritis and degenerative disc disease of your lumbar spine.  2.)  Return immediately for any new or worsening symptoms.

## 2023-08-05 NOTE — ED PROVIDER NOTES
Encounter Date: 8/4/2023       History     Chief Complaint   Patient presents with    Leg Pain     Pt states that she is having pain to the right inner hip area that has been going on for a couple of days. Pt advised that pain is no longer tolerable, no relief with home medications. Pt denies any trauma      Patient presents to the emergency department with left lower quadrant/left-sided pelvic pain for the past 2 days.  She states that movement exacerbates this pain.  She denies any low back pain.  She denies any paresthesias or numbness.  She is able to ambulate however it is antalgic.  She denies any trauma.  She denies any bowel or bladder incontinence, saddle anesthesia, radicular symptoms.  She states that she has a history of a partial hysterectomy.  She denies any vaginal bleeding or discharge.  States that she tried to take some naproxen which did not help earlier today.      Review of patient's allergies indicates:   Allergen Reactions    Ace inhibitors Swelling    Benicar [olmesartan] Swelling    Methyldopa Other (See Comments)     sleepy    Clonidine Other (See Comments)     sleepy     Past Medical History:   Diagnosis Date    Anemia     Diverticulosis     History of gastroesophageal reflux (GERD)     Hyperlipidemia     Hypertension     Stroke 2015    Trauma 1998    Fractured Right ankle     Past Surgical History:   Procedure Laterality Date    BARTHOLIN GLAND CYST EXCISION      Marsupialization, not excision    COLONOSCOPY      COLONOSCOPY N/A 4/17/2023    Procedure: COLONOSCOPY;  Surgeon: Sam Ospina MD;  Location: [facility];  Service: Endoscopy;  Laterality: N/A;    HYSTERECTOMY  10/2006    Laparoscopic Supracervical Hysterectomy    TUBAL LIGATION       Family History   Problem Relation Age of Onset    Colon cancer Mother 55    Hypertension Mother     Diabetes Father     Hypertension Father     Hypertension Brother     Hypertension Sister     Heart disease Neg Hx     Heart attack Neg Hx      Breast cancer Neg Hx     Ovarian cancer Neg Hx      Social History     Tobacco Use    Smoking status: Never    Smokeless tobacco: Never   Substance Use Topics    Alcohol use: Not Currently    Drug use: No     Review of Systems   Genitourinary:  Positive for pelvic pain.       Physical Exam     Initial Vitals [08/04/23 1834]   BP Pulse Resp Temp SpO2   (!) 158/78 65 16 98 °F (36.7 °C) 96 %      MAP       --         Physical Exam    Vitals reviewed.  Constitutional: She appears well-developed.   Cardiovascular:  Normal rate and regular rhythm.           No murmur heard.  Pulmonary/Chest: Breath sounds normal. She has no wheezes.   Abdominal: Abdomen is soft. There is no abdominal tenderness.   Genitourinary:    Genitourinary Comments: There is tenderness to palpation to the left pelvic/left lower quadrant/left groin area.  I do not appreciate a hernia.     Musculoskeletal:         General: Normal range of motion.     Neurological: She is alert and oriented to person, place, and time. She has normal strength. No sensory deficit.   Skin: Skin is warm and dry. No rash noted.         ED Course   Procedures  Labs Reviewed   URINALYSIS, REFLEX TO URINE CULTURE - Abnormal; Notable for the following components:       Result Value    Protein, UA Trace (*)     All other components within normal limits    Narrative:     Specimen Source->Urine   CBC W/ AUTO DIFFERENTIAL - Abnormal; Notable for the following components:    Hemoglobin 11.4 (*)     Hematocrit 35.9 (*)     MCV 77 (*)     MCH 24.3 (*)     MCHC 31.8 (*)     RDW 16.1 (*)     All other components within normal limits   COMPREHENSIVE METABOLIC PANEL - Abnormal; Notable for the following components:    Potassium 3.3 (*)     All other components within normal limits          Imaging Results              CT Abdomen Pelvis With Contrast (Final result)  Result time 08/04/23 22:48:14      Final result by Selwyn Burris MD (08/04/23 22:48:14)                   Impression:       Interval development of an L3 compression fracture deformity since 2018, probably not acute.    Scattered degenerative changes, including multilevel involvement in the visualized spine, with facet arthropathy LS greatest at L4-5 bilaterally, and with multilevel lumbar Baastrup's disease.    Pre-existing 3.1 cm left hepatic lobe cyst.    A subcentimeter right hepatic lobe hypodensity is too small to fully characterize by CT but, statistically, is favored to also represent a cyst.    A 1.8 cm pre-existing left upper pole renal hypodensity is not fully characterized on today's scan, but is statistically also favored to represent a cyst.    Small hiatal hernia.    Otherwise, no acute abnormalities that would explain the patient's reported left lower quadrant pain are identified in the abdomen or pelvis.  Correlate clinically.      Electronically signed by: Selwyn Burris  Date:    08/04/2023  Time:    22:48               Narrative:    EXAMINATION:  CT ABDOMEN PELVIS WITH CONTRAST    CLINICAL HISTORY:  LLQ abdominal pain;    TECHNIQUE:  Low dose axial images, sagittal and coronal reformations were obtained from the lung bases to the pubic symphysis following the IV administration of 100 mL of Omnipaque 350 .  No oral or rectal contrast was administered.    COMPARISON:  CT abdomen pelvis without IV contrast, 06/16/2018    FINDINGS:  LOWER THORAX:    No acute pulmonary, pleural, cardiopericardial or mediastinal abnormality is apparent.    Small hiatal hernia.    ABDOMEN:    Pre-existing water attenuation cyst in the left hepatic lobe medial segment, now 3.1 cm in greatest axial dimension, mildly increased from 2.8 cm in 2018.    A 7 mm hypodensity in the dorsal subcapsular aspect of the right hepatic lobe (series 2, image 52) is too small to fully characterize by CT.  Statistically, it is favored to represent a cyst.  It was probably also present in 2018.    Gallbladder: Poorly distended.    Biliary tree: No abnormal  ductal dilatation.    Pancreas: No acute CT abnormality.  Pre-existing minimal prominence of the pancreatic duct at the head, neck, and proximal body, similar to 2018.    Spleen: Normal    Adrenals: Normal bilaterally.    Kidneys: Minimal distension of the collecting systems and ureters bilaterally, possibly related to partial distention of the urinary bladder.  No radiopaque calculi are demonstrated in the kidneys, ureters, urinary bladder, or urethra.    An approximately 1.8 cm intraparenchymal hypodensity in the left upper renal pole was present previously and demonstrates water attenuation centrally.  It is not fully characterized on today's scan, but it is favored to represent a cyst.    Stomach and bowel: There are again some a focal discrete high attenuation rounded/disklike radiopacities in the gastric lumen, likely representing ingested antacids or other ingested radiopaque material.  The gastric lumen is poorly distended, limiting assessment for mural or mucosal thickening, especially along the greater curvature (note that H pylori gastritis can cause preferential mucosal thickening of the greater curvature).    Small hiatal hernia.    Normal course and caliber of the duodenal C-loop.    No abnormally dilated small or large bowel.    Normal appendix.  No CT evidence of acute colonic or pericolonic abnormality.    Vasculature: Minimal atherosclerotic calcifications.  Normal course and caliber of the abdominal aorta and IVC.  No abdominal aortic aneurysm.    Pelvic phleboliths.    Lymph nodes: By 1 cm short axis diameter size criteria, no intra-abdominal intrapelvic lymphadenopathy is detected.    No pneumoperitoneum.    No free or loculated intraperitoneal fluid.    PELVIS:    Status post hysterectomy, likely partial.    Subcentimeter hypodensity projecting anterior superior to the vagina possibly represents a nabothian cyst within the remnant of the uterine cervix.    Ovaries are inconspicuous  bilaterally.    The urinary bladder is moderately distended.    Abdominal wall: Small, fat-containing umbilical hernia.    Musculoskeletal: No acute CT abnormality.  Scattered degenerative changes.  Small nodule sclerotic intraosseous foci in the right acetabular roof and right iliac wing most likely represent benign bone islands.    Interval development of mild anterior vertebral body wedging at L3 with a new anterior superior osteophyte, compatible with an intercurrent, but likely not acute, L3 compression fracture and superior endplate Schmorl's node.  There is approximately 14% loss of L3 anterior vertebral body height, and approximately 44% of central vertebral body height loss.    Scattered mild degenerative changes.  Including multilevel lumbar facet disease, greatest at L4-5 bilaterally.    L2-3, L3-4, and possibly L4-5 Baastrup's disease.                                       US Pelvis Comp with Transvag NON-OB (xpd) (Final result)  Result time 08/04/23 20:48:14      Final result by Tucker Blue MD (08/04/23 20:48:14)                   Impression:      Overall limited exam given transabdominal technique.  Uterus is surgically absent, no discrete adnexal abnormalities noting the ovaries are not visualized.      Electronically signed by: Tucker Blue MD  Date:    08/04/2023  Time:    20:48               Narrative:    EXAMINATION:  US PELVIS COMP WITH TRANSVAG NON-OB (XPD)    CLINICAL HISTORY:  left sided pelvic pain has hx of partial hysterectomy;    TECHNIQUE:  Transabdominal sonography of the pelvis was performed, please note, patient elected to forego the transvaginal portion of the exam.    COMPARISON:  11/10/2006    FINDINGS:  Patient is status post partial hysterectomy.  Cine loops through the left and right adnexa are unremarkable noting the ovaries are not identified.  No discrete focal masses or fluid collections.                                       Medications   ketorolac injection 15 mg  (15 mg Intravenous Given 8/4/23 2005)   iohexoL (OMNIPAQUE 350) injection 100 mL (100 mLs Intravenous Given 8/4/23 2126)     Medical Decision Making:   ED Management:  CT scan shows no acute findings.  The patient was able to ambulate around the emergency department.  We discussed the incidental findings on her CT scan and she will discuss this with the primary care provider at her re-evaluation appointment.  Instructed patient to return immediately for any new or worsening symptoms and she verbalized understanding.             ED Course as of 08/05/23 1320   Fri Aug 04, 2023   2255 CBC auto differential(!)  Stable microcytic anemia [CD]   2255 Comprehensive metabolic panel(!)  Mild hypokalemia [CD]   2256 Urinalysis, Reflex to Urine Culture Urine, Clean Catch(!)  No UTI [CD]   2256 US Pelvis Comp with Transvag NON-OB (xpd)  No acute findings [CD]      ED Course User Index  [CD] Qamar Acevedo DO                 Clinical Impression:   Final diagnoses:  [R10.32] Left groin pain (Primary)        ED Disposition Condition    Discharge Stable          ED Prescriptions       Medication Sig Dispense Start Date End Date Auth. Provider    naproxen (NAPROSYN) 500 MG tablet Take 1 tablet (500 mg total) by mouth 2 (two) times daily with meals. 10 tablet 8/4/2023 -- Qamar Acevedo DO    orphenadrine (NORFLEX) 100 mg tablet Take 1 tablet (100 mg total) by mouth 2 (two) times daily. 10 tablet 8/4/2023 -- Qamar Acevedo DO          Follow-up Information       Follow up With Specialties Details Why Contact Info    Anna Dos Santos MD Family Medicine Schedule an appointment as soon as possible for a visit   [address]  [place] [address]  [phone]               Qamar Acevedo DO  08/05/23 1323

## 2023-08-25 ENCOUNTER — LAB VISIT (OUTPATIENT)
Dept: LAB | Facility: HOSPITAL | Age: 58
End: 2023-08-25
Attending: INTERNAL MEDICINE
Payer: COMMERCIAL

## 2023-08-25 DIAGNOSIS — R73.03 PRE-DIABETES: ICD-10-CM

## 2023-08-25 DIAGNOSIS — I10 PRIMARY HYPERTENSION: ICD-10-CM

## 2023-08-25 DIAGNOSIS — E87.6 DIURETIC-INDUCED HYPOKALEMIA: ICD-10-CM

## 2023-08-25 DIAGNOSIS — R60.9 EDEMA, UNSPECIFIED TYPE: ICD-10-CM

## 2023-08-25 DIAGNOSIS — T50.2X5A DIURETIC-INDUCED HYPOKALEMIA: ICD-10-CM

## 2023-08-25 LAB
ALBUMIN SERPL BCP-MCNC: 3.5 G/DL (ref 3.5–5.2)
ALP SERPL-CCNC: 81 U/L (ref 55–135)
ALT SERPL W/O P-5'-P-CCNC: 17 U/L (ref 10–44)
ANION GAP SERPL CALC-SCNC: 12 MMOL/L (ref 8–16)
AST SERPL-CCNC: 14 U/L (ref 10–40)
BILIRUB SERPL-MCNC: 0.6 MG/DL (ref 0.1–1)
BUN SERPL-MCNC: 13 MG/DL (ref 6–20)
CALCIUM SERPL-MCNC: 9.4 MG/DL (ref 8.7–10.5)
CHLORIDE SERPL-SCNC: 102 MMOL/L (ref 95–110)
CO2 SERPL-SCNC: 29 MMOL/L (ref 23–29)
CREAT SERPL-MCNC: 0.9 MG/DL (ref 0.5–1.4)
EST. GFR  (NO RACE VARIABLE): >60 ML/MIN/1.73 M^2
GLUCOSE SERPL-MCNC: 101 MG/DL (ref 70–110)
MAGNESIUM SERPL-MCNC: 1.7 MG/DL (ref 1.6–2.6)
POTASSIUM SERPL-SCNC: 3 MMOL/L (ref 3.5–5.1)
PROT SERPL-MCNC: 7.7 G/DL (ref 6–8.4)
SODIUM SERPL-SCNC: 143 MMOL/L (ref 136–145)

## 2023-08-25 PROCEDURE — 83735 ASSAY OF MAGNESIUM: CPT | Performed by: INTERNAL MEDICINE

## 2023-08-25 PROCEDURE — 80053 COMPREHEN METABOLIC PANEL: CPT | Performed by: INTERNAL MEDICINE

## 2023-08-25 PROCEDURE — 36415 COLL VENOUS BLD VENIPUNCTURE: CPT | Performed by: INTERNAL MEDICINE

## 2023-08-27 ENCOUNTER — PATIENT MESSAGE (OUTPATIENT)
Dept: FAMILY MEDICINE | Facility: CLINIC | Age: 58
End: 2023-08-27
Payer: COMMERCIAL

## 2023-09-01 ENCOUNTER — OFFICE VISIT (OUTPATIENT)
Dept: FAMILY MEDICINE | Facility: CLINIC | Age: 58
End: 2023-09-01
Payer: COMMERCIAL

## 2023-09-01 VITALS
HEIGHT: 71 IN | WEIGHT: 239.19 LBS | SYSTOLIC BLOOD PRESSURE: 134 MMHG | HEART RATE: 68 BPM | DIASTOLIC BLOOD PRESSURE: 72 MMHG | OXYGEN SATURATION: 98 % | BODY MASS INDEX: 33.48 KG/M2

## 2023-09-01 DIAGNOSIS — Z86.73 HISTORY OF STROKE: ICD-10-CM

## 2023-09-01 DIAGNOSIS — T50.2X5A DIURETIC-INDUCED HYPOKALEMIA: Primary | ICD-10-CM

## 2023-09-01 DIAGNOSIS — R73.03 PRE-DIABETES: ICD-10-CM

## 2023-09-01 DIAGNOSIS — I10 PRIMARY HYPERTENSION: ICD-10-CM

## 2023-09-01 DIAGNOSIS — R60.9 EDEMA, UNSPECIFIED TYPE: ICD-10-CM

## 2023-09-01 DIAGNOSIS — E87.6 DIURETIC-INDUCED HYPOKALEMIA: Primary | ICD-10-CM

## 2023-09-01 DIAGNOSIS — E78.5 HYPERLIPIDEMIA, UNSPECIFIED HYPERLIPIDEMIA TYPE: ICD-10-CM

## 2023-09-01 PROCEDURE — 99999 PR PBB SHADOW E&M-EST. PATIENT-LVL III: ICD-10-PCS | Mod: PBBFAC,,, | Performed by: INTERNAL MEDICINE

## 2023-09-01 PROCEDURE — 99214 PR OFFICE/OUTPT VISIT, EST, LEVL IV, 30-39 MIN: ICD-10-PCS | Mod: S$GLB,,, | Performed by: INTERNAL MEDICINE

## 2023-09-01 PROCEDURE — 3044F PR MOST RECENT HEMOGLOBIN A1C LEVEL <7.0%: ICD-10-PCS | Mod: CPTII,S$GLB,, | Performed by: INTERNAL MEDICINE

## 2023-09-01 PROCEDURE — 99214 OFFICE O/P EST MOD 30 MIN: CPT | Mod: S$GLB,,, | Performed by: INTERNAL MEDICINE

## 2023-09-01 PROCEDURE — 3078F DIAST BP <80 MM HG: CPT | Mod: CPTII,S$GLB,, | Performed by: INTERNAL MEDICINE

## 2023-09-01 PROCEDURE — 3044F HG A1C LEVEL LT 7.0%: CPT | Mod: CPTII,S$GLB,, | Performed by: INTERNAL MEDICINE

## 2023-09-01 PROCEDURE — 3008F PR BODY MASS INDEX (BMI) DOCUMENTED: ICD-10-PCS | Mod: CPTII,S$GLB,, | Performed by: INTERNAL MEDICINE

## 2023-09-01 PROCEDURE — 3075F PR MOST RECENT SYSTOLIC BLOOD PRESS GE 130-139MM HG: ICD-10-PCS | Mod: CPTII,S$GLB,, | Performed by: INTERNAL MEDICINE

## 2023-09-01 PROCEDURE — 3075F SYST BP GE 130 - 139MM HG: CPT | Mod: CPTII,S$GLB,, | Performed by: INTERNAL MEDICINE

## 2023-09-01 PROCEDURE — 3008F BODY MASS INDEX DOCD: CPT | Mod: CPTII,S$GLB,, | Performed by: INTERNAL MEDICINE

## 2023-09-01 PROCEDURE — 1160F RVW MEDS BY RX/DR IN RCRD: CPT | Mod: CPTII,S$GLB,, | Performed by: INTERNAL MEDICINE

## 2023-09-01 PROCEDURE — 3078F PR MOST RECENT DIASTOLIC BLOOD PRESSURE < 80 MM HG: ICD-10-PCS | Mod: CPTII,S$GLB,, | Performed by: INTERNAL MEDICINE

## 2023-09-01 PROCEDURE — 99999 PR PBB SHADOW E&M-EST. PATIENT-LVL III: CPT | Mod: PBBFAC,,, | Performed by: INTERNAL MEDICINE

## 2023-09-01 PROCEDURE — 1159F PR MEDICATION LIST DOCUMENTED IN MEDICAL RECORD: ICD-10-PCS | Mod: CPTII,S$GLB,, | Performed by: INTERNAL MEDICINE

## 2023-09-01 PROCEDURE — 1160F PR REVIEW ALL MEDS BY PRESCRIBER/CLIN PHARMACIST DOCUMENTED: ICD-10-PCS | Mod: CPTII,S$GLB,, | Performed by: INTERNAL MEDICINE

## 2023-09-01 PROCEDURE — 1159F MED LIST DOCD IN RCRD: CPT | Mod: CPTII,S$GLB,, | Performed by: INTERNAL MEDICINE

## 2023-09-01 RX ORDER — SPIRONOLACTONE 50 MG/1
50 TABLET, FILM COATED ORAL 2 TIMES DAILY
Qty: 60 TABLET | Refills: 11 | Status: SHIPPED | OUTPATIENT
Start: 2023-09-01 | End: 2024-08-31

## 2023-09-01 RX ORDER — SPIRONOLACTONE 25 MG/1
50 TABLET ORAL 2 TIMES DAILY
Qty: 120 TABLET | Refills: 11 | Status: SHIPPED | OUTPATIENT
Start: 2023-09-01 | End: 2023-09-01

## 2023-09-01 NOTE — PROGRESS NOTES
Subjective:       Patient ID: Lori Burgess is a 57 y.o. female.    Chief Complaint: Hypertension      HPI  Lori Burgess is a 57 y.o. female with hypertension, hypokalemia, hyperlipidemia, prediabetes, and history of stroke (presented with left-sided weakness but resolved)  who presents today for Hypertension    Reports her blood pressure has been stable.  Overall feels well, tolerating the medication.  Has been drinking good amount of water.  Takes her potassium twice a day.  Denies chest pains palpitations, shortness of breath, or increased leg swelling.  Has some leg swollen at the end of the day.    Follow-up labs with persistent hypokalemia.  Message presenting but did not get it.  He is on diuretic placement not helping potassium.  Reports she is well.  Watches her diet and trying to lose weight.    Has no toxic habits.  Works with a close and she is a .    Health Maintenance:  Health Maintenance   Topic Date Due    TETANUS VACCINE  Never done    Shingles Vaccine (1 of 2) Never done    Lipid Panel  05/19/2024    Mammogram  08/01/2024    High Dose Statin  09/01/2024    Colorectal Cancer Screening  04/17/2028    Hepatitis C Screening  Completed       Review of Systems   All other systems reviewed and are negative.     Past Medical History:   Diagnosis Date    Anemia     Diverticulosis     History of gastroesophageal reflux (GERD)     Hyperlipidemia     Hypertension     Stroke 2015    Trauma 1998    Fractured Right ankle       Past Surgical History:   Procedure Laterality Date    BARTHOLIN GLAND CYST EXCISION      Marsupialization, not excision    COLONOSCOPY      COLONOSCOPY N/A 4/17/2023    Procedure: COLONOSCOPY;  Surgeon: Sam Ospina MD;  Location: [facility];  Service: Endoscopy;  Laterality: N/A;    HYSTERECTOMY  10/2006    Laparoscopic Supracervical Hysterectomy    TUBAL LIGATION         Family History   Problem Relation Age of Onset    Colon cancer Mother 55    Hypertension  Mother     Diabetes Father     Hypertension Father     Hypertension Brother     Hypertension Sister     Heart disease Neg Hx     Heart attack Neg Hx     Breast cancer Neg Hx     Ovarian cancer Neg Hx        Social History     Socioeconomic History    Marital status: Single   Occupational History     Employer: [employer]   Tobacco Use    Smoking status: Never    Smokeless tobacco: Never   Substance and Sexual Activity    Alcohol use: Not Currently    Drug use: No    Sexual activity: Not Currently     Birth control/protection: None, Condom, See Surgical Hx     Comment: Hysterectomy     Social Determinants of Health     Financial Resource Strain: Medium Risk (11/23/2022)    Overall Financial Resource Strain (CARDIA)     Difficulty of Paying Living Expenses: Somewhat hard   Food Insecurity: Food Insecurity Present (11/23/2022)    Hunger Vital Sign     Worried About Running Out of Food in the Last Year: Sometimes true     Ran Out of Food in the Last Year: Sometimes true   Transportation Needs: No Transportation Needs (11/23/2022)    PRAPARE - Transportation     Lack of Transportation (Medical): No     Lack of Transportation (Non-Medical): No   Physical Activity: Unknown (11/23/2022)    Exercise Vital Sign     Days of Exercise per Week: 6 days   Stress: No Stress Concern Present (11/23/2022)    [employer] [place] [employer] - Occupational Stress Questionnaire     Feeling of Stress : Not at all   Social Connections: Unknown (11/23/2022)    Social Connection and Isolation Panel [NHANES]     Frequency of Communication with Friends and Family: More than three times a week     Frequency of Social Gatherings with Friends and Family: More than three times a week     Active Member of Clubs or Organizations: No     Attends Club or Organization Meetings: Never     Marital Status:    Housing Stability: High Risk (11/23/2022)    Housing Stability Vital Sign     Unable to Pay for Housing in the  "Last Year: Yes     Number of Places Lived in the Last Year: 1     Unstable Housing in the Last Year: No       Current Outpatient Medications   Medication Sig Dispense Refill    aspirin (ECOTRIN) 325 MG EC tablet Take 1 tablet (325 mg total) by mouth once daily.  0    atorvastatin (LIPITOR) 20 MG tablet Take 1 tablet (20 mg total) by mouth once daily. 90 tablet 3    carvediloL (COREG) 25 MG tablet Take 1 tablet (25 mg total) by mouth 2 (two) times daily with meals. 180 tablet 3    naproxen (NAPROSYN) 500 MG tablet Take 1 tablet (500 mg total) by mouth 2 (two) times daily with meals. 10 tablet 0    NIFEdipine (PROCARDIA-XL) 90 MG (OSM) 24 hr tablet Take 1 tablet by mouth once daily 90 tablet 3    orphenadrine (NORFLEX) 100 mg tablet Take 1 tablet (100 mg total) by mouth 2 (two) times daily. 10 tablet 0    spironolactone (ALDACTONE) 50 MG tablet Take 1 tablet (50 mg total) by mouth 2 (two) times daily. 60 tablet 11     No current facility-administered medications for this visit.       Review of patient's allergies indicates:   Allergen Reactions    Ace inhibitors Swelling    Benicar [olmesartan] Swelling    Methyldopa Other (See Comments)     sleepy    Clonidine Other (See Comments)     sleepy         Objective:       Last 3 sets of Vitals        6/6/2023     3:00 PM 8/4/2023     6:34 PM 9/1/2023     3:31 PM   Vitals - 1 value per visit   SYSTOLIC 136 158 134   DIASTOLIC 84 78 72   Pulse  65 68   Temp  98 °F (36.7 °C)    Resp  16    SPO2  96 % 98 %   Weight (lb)  200 239.2   Weight (kg)  90.719 108.5   Height   5' 11" (1.803 m)   BMI (Calculated)   33.4   Pain Score   Zero   Physical Exam  Constitutional:       General: She is not in acute distress.     Appearance: Normal appearance.   HENT:      Head: Normocephalic.   Eyes:      General: No scleral icterus.     Extraocular Movements: Extraocular movements intact.      Conjunctiva/sclera: Conjunctivae normal.   Neck:      Vascular: No carotid bruit.      Comments: No " goiter.  Cardiovascular:      Rate and Rhythm: Normal rate and regular rhythm.      Pulses: Normal pulses.      Heart sounds: Normal heart sounds.   Pulmonary:      Effort: Pulmonary effort is normal.      Breath sounds: Normal breath sounds.   Abdominal:      General: Bowel sounds are normal. There is no distension.      Palpations: Abdomen is soft. There is no mass.      Tenderness: There is no abdominal tenderness.   Musculoskeletal:         General: Swelling (mild ankle edema) present. Normal range of motion.   Lymphadenopathy:      Cervical: No cervical adenopathy.   Skin:     General: Skin is warm and dry.   Neurological:      General: No focal deficit present.      Mental Status: She is alert and oriented to person, place, and time.   Psychiatric:         Mood and Affect: Mood normal.         Behavior: Behavior normal.             CBC:  Recent Labs   Lab 03/04/22  1446 05/19/23  0655 08/04/23 1952   WBC 5.56 5.29 6.45   RBC 4.61 4.86 4.69   Hemoglobin 11.1 L 11.8 L 11.4 L   Hematocrit 35.3 L 37.2 35.9 L   Platelets 239 242 240   MCV 77 L 77 L 77 L   MCH 24.1 L 24.3 L 24.3 L   MCHC 31.4 L 31.7 L 31.8 L     CMP:  Recent Labs   Lab 05/19/23  0655 08/04/23 1952 08/25/23  0807   Glucose 105 100 101   Calcium 9.6 9.6 9.4   Albumin 3.5 3.7 3.5   Total Protein 7.8 7.9 7.7   Sodium 142 141 143   Potassium 3.2 L 3.3 L 3.0 L   CO2 31 H 25 29   Chloride 102 105 102   BUN 13 20 13   Creatinine 0.8 1.0 0.9   Alkaline Phosphatase 82 95 81   ALT 14 16 17   AST 13 13 14   Total Bilirubin 0.7 0.5 0.6     URINALYSIS:  Recent Labs   Lab 09/10/20  0931 09/15/20  0918 09/29/20  0954 08/04/23 1942   Color, UA Yael   < > Yellow Yellow   Specific Gravity, UA 1.030   < > 1.025 1.025   pH, UA 5.0   < > 6.0 6.0   Protein, UA 1+ A   < > 1+ A Trace A   Bacteria Occasional   < > Rare  --    Nitrite, UA Negative   < > Negative Negative   Leukocytes, UA 1+ A   < > 2+ A Negative   Urobilinogen, UA  --    < >  --  Negative   Hyaline Casts,  UA 5 A  --  0  --     < > = values in this interval not displayed.      LIPIDS:  Recent Labs   Lab 09/15/20  0842 09/29/20  1010 05/19/23  0655   TSH  --  0.719 1.444   HDL 43  --  47   Cholesterol 127  --  136   Triglycerides 80  --  61   LDL Cholesterol 68.0  --  76.8   HDL/Cholesterol Ratio 33.9  --  34.6   Non-HDL Cholesterol 84  --  89   Total Cholesterol/HDL Ratio 3.0  --  2.9     TSH:  Recent Labs   Lab 09/29/20  1010 05/19/23  0655   TSH 0.719 1.444       A1C:  Recent Labs   Lab 05/19/23  0655   Hemoglobin A1C 5.8 H       Imaging:  Mammo Digital Screening Bilat w/ Nando  Narrative: Result:  Mammo Digital Screening Bilat w/ Nando    History:  Patient is 57 y.o. and is seen for a screening mammogram.    Films Compared:  Prior images (if available) were compared.     Findings:   This procedure was performed using tomosynthesis.   Computer-aided detection was utilized in the interpretation of this   examination.    The breasts are almost entirely fatty. There is no evidence of suspicious   masses, microcalcifications or architectural distortion.  Impression:    No mammographic evidence of malignancy.    BI-RADS Category 1: Negative    Recommendation:  Routine screening mammogram in 1 year is recommended.    Your estimated lifetime risk of breast cancer (to age 85) based on   Tyrer-Cuzick risk assessment model is 5.22 %.  According to the American   Cancer Society, patients with a lifetime breast cancer risk of 20% or   higher might benefit from supplemental screening tests. ??       Assessment:       1. Diuretic-induced hypokalemia    2. Primary hypertension    3. Edema, unspecified type    4. Pre-diabetes    5. Hyperlipidemia, unspecified hyperlipidemia type    6. History of stroke            Plan:       1. Diuretic-induced hypokalemia  -     she was on spironolactone few years ago but unclear whites was discontinued will restart and monitor her potassium  - spironolactone (ALDACTONE) 50 MG tablet; Take 1 tablet  (50 mg total) by mouth 2 (two) times daily.  Dispense: 60 tablet; Refill: 11  -     BASIC METABOLIC PANEL; Future; Expected date: 09/01/2023  -     MAGNESIUM; Future; Expected date: 09/01/2023  - may need nephrology evaluations    2. Primary hypertension  -     spironolactone (ALDACTONE) 50 MG tablet; Take 1 tablet (50 mg total) by mouth 2 (two) times daily.  Dispense: 60 tablet; Refill: 11  -     BASIC METABOLIC PANEL; Future; Expected date: 09/01/2023  - stable and controlled but needs to be monitor with the changes in diuretics.    3. Edema, unspecified type  -     BASIC METABOLIC PANEL; Future; Expected date: 09/01/2023  - diuretic will be changed to spironolactone    4. Pre-diabetes   - Continue to monitor.   - Last A1c was 5.8%    5. Hyperlipidemia, unspecified hyperlipidemia type   - Looks good on atorvastatin    6. History of stroke   - on aspirin and atorvastatin.  Blood pressure control.       Health Maintenance Due   Topic Date Due    Pneumococcal Vaccines (Age 0-64) (1 - PCV) Never done    TETANUS VACCINE  Never done    Shingles Vaccine (1 of 2) Never done    COVID-19 Vaccine (3 - Pfizer series) 06/25/2021    Influenza Vaccine (1) Never done            Return to clinic in 2-4 weeks.    Anna Dos Santos MD  [facility]  Disclaimer:  This note has been generated using voice-recognition software. There may be grammatical or spelling errors that have been missed during proof-reading

## 2023-09-06 ENCOUNTER — TELEPHONE (OUTPATIENT)
Dept: FAMILY MEDICINE | Facility: CLINIC | Age: 58
End: 2023-09-06
Payer: COMMERCIAL

## 2023-09-06 NOTE — TELEPHONE ENCOUNTER
Pharmacist at [facility] would like to know which Spironolactone 50 MG tablets or Spironolactone 25 MG tablet should be filled. Pls advise.

## 2023-09-06 NOTE — TELEPHONE ENCOUNTER
----- Message from Kathleen Wild sent at 9/6/2023  1:17 PM CDT -----  Regarding: GERARDO/[phone]  Contact: GERARDO/[phone]  Type:  Pharmacy Calling to Clarify an RX    Name of Caller:   Pharmacy Name: [facility] [address] [place] [address];  Prescription Name: spironolactone (ALDACTONE) 50 MG tablets and spironolactone (ALDACTONE) 25 MG tablet  What do they need to clarify?: which one should be filled   Best Call Back Number: GERARDO/[phone]  Additional Information:

## 2023-11-27 ENCOUNTER — HOSPITAL ENCOUNTER (OUTPATIENT)
Dept: RADIOLOGY | Facility: HOSPITAL | Age: 58
Discharge: HOME | End: 2023-11-27
Attending: SURGERY
Payer: COMMERCIAL

## 2023-11-27 ENCOUNTER — OFFICE VISIT (OUTPATIENT)
Dept: ORTHOPEDICS | Facility: CLINIC | Age: 58
End: 2023-11-27
Payer: COMMERCIAL

## 2023-11-27 ENCOUNTER — TELEPHONE (OUTPATIENT)
Dept: NEUROLOGY | Facility: CLINIC | Age: 58
End: 2023-11-27
Payer: COMMERCIAL

## 2023-11-27 VITALS — WEIGHT: 239.19 LBS | HEIGHT: 71 IN | BODY MASS INDEX: 33.48 KG/M2

## 2023-11-27 DIAGNOSIS — M25.571 CHRONIC PAIN OF BOTH ANKLES: Primary | ICD-10-CM

## 2023-11-27 DIAGNOSIS — G89.29 CHRONIC PAIN OF BOTH ANKLES: Primary | ICD-10-CM

## 2023-11-27 DIAGNOSIS — M25.571 CHRONIC PAIN OF BOTH ANKLES: ICD-10-CM

## 2023-11-27 DIAGNOSIS — G89.29 CHRONIC PAIN OF BOTH ANKLES: ICD-10-CM

## 2023-11-27 DIAGNOSIS — M25.572 CHRONIC PAIN OF BOTH ANKLES: ICD-10-CM

## 2023-11-27 DIAGNOSIS — M25.572 CHRONIC PAIN OF BOTH ANKLES: Primary | ICD-10-CM

## 2023-11-27 DIAGNOSIS — M79.2 NERVE PAIN: Primary | ICD-10-CM

## 2023-11-27 DIAGNOSIS — M25.571 RIGHT ANKLE PAIN, UNSPECIFIED CHRONICITY: ICD-10-CM

## 2023-11-27 PROCEDURE — 99213 PR OFFICE/OUTPT VISIT, EST, LEVL III, 20-29 MIN: ICD-10-PCS | Mod: S$GLB,,, | Performed by: SURGERY

## 2023-11-27 PROCEDURE — 73610 X-RAY EXAM OF ANKLE: CPT | Mod: 26,,, | Performed by: INTERNAL MEDICINE

## 2023-11-27 PROCEDURE — 99213 OFFICE O/P EST LOW 20 MIN: CPT | Mod: S$GLB,,, | Performed by: SURGERY

## 2023-11-27 PROCEDURE — 3044F PR MOST RECENT HEMOGLOBIN A1C LEVEL <7.0%: ICD-10-PCS | Mod: CPTII,S$GLB,, | Performed by: SURGERY

## 2023-11-27 PROCEDURE — 73610 X-RAY EXAM OF ANKLE: CPT | Mod: TC,50,PN

## 2023-11-27 PROCEDURE — 1159F MED LIST DOCD IN RCRD: CPT | Mod: CPTII,S$GLB,, | Performed by: SURGERY

## 2023-11-27 PROCEDURE — 3044F HG A1C LEVEL LT 7.0%: CPT | Mod: CPTII,S$GLB,, | Performed by: SURGERY

## 2023-11-27 PROCEDURE — 73630 X-RAY EXAM OF FOOT: CPT | Mod: 26,,, | Performed by: INTERNAL MEDICINE

## 2023-11-27 PROCEDURE — 73610 XR ANKLE COMPLETE 3 VIEW BILATERAL: ICD-10-PCS | Mod: 26,,, | Performed by: INTERNAL MEDICINE

## 2023-11-27 PROCEDURE — 73630 X-RAY EXAM OF FOOT: CPT | Mod: TC,50,PN

## 2023-11-27 PROCEDURE — 99999 PR PBB SHADOW E&M-EST. PATIENT-LVL III: ICD-10-PCS | Mod: PBBFAC,,, | Performed by: SURGERY

## 2023-11-27 PROCEDURE — 73630 XR FOOT COMPLETE 3 VIEW BILATERAL: ICD-10-PCS | Mod: 26,,, | Performed by: INTERNAL MEDICINE

## 2023-11-27 PROCEDURE — 3008F PR BODY MASS INDEX (BMI) DOCUMENTED: ICD-10-PCS | Mod: CPTII,S$GLB,, | Performed by: SURGERY

## 2023-11-27 PROCEDURE — 3008F BODY MASS INDEX DOCD: CPT | Mod: CPTII,S$GLB,, | Performed by: SURGERY

## 2023-11-27 PROCEDURE — 1159F PR MEDICATION LIST DOCUMENTED IN MEDICAL RECORD: ICD-10-PCS | Mod: CPTII,S$GLB,, | Performed by: SURGERY

## 2023-11-27 PROCEDURE — 99999 PR PBB SHADOW E&M-EST. PATIENT-LVL III: CPT | Mod: PBBFAC,,, | Performed by: SURGERY

## 2023-11-27 NOTE — PROGRESS NOTES
Patient ID: Lori Burgess is a 58 y.o. female.    Chief Complaint: Pain of the Left Ankle and Pain of the Right Ankle    HPI     Lori Burgess has experienced problems with the bilateral foot over the past numerous years. The right is worse than the left. She is a  and on her feet a lot. Describes pain as burning and on the middle of the foot. It impairs ambulation. She has not tried anything for it including PT, injections, orthotics, bracing, or surgery.    This is her first appointment for this problem.    ROS      Objective:      Ortho/SPM Exam        There were no vitals filed for this visit.    The patient is in acute distress.   Body habitus is normal.  The skin over the foot and ankle is intact.  Effusion 0  Palpation- tender about the medial aspect of the ankle  Range of motion- mildly decreased  Ligament laxity exam: stable  Flatfoot positive. Corrects with heel raise  Pulses DP present, PT present.  Motor normal 5/5 strength in all tested muscle groups.   Sensory normal.  +Tinels tap, right foot  Nerve pain worse with dorsiflexion/eversion    IMAGING- x-rays of the complete foot and ankle  These images were independently reviewed and discussed with the patient.  Ankle arthritis, flatfoot deformity    Assessment:   Ankle arthritis, bilateral  Ankle nerve pain, concern for tarsal tunnel, right foot      Plan:   We will obtain nerve conduction studies/EMG for tarsal tunnel.  Discussed ankle arthritis non-operative and operative management.  Injection offered to ankle joint, patient declined.  Patient expressed understanding of this plan and all questions were answered.    Simone Menjivar MD  [facility]  Department of Orthopedic Surgery    This note is dictated using the M*Modal Fluency Direct word recognition program. There are word recognition mistakes that are occasionally missed on review.

## 2023-11-27 NOTE — TELEPHONE ENCOUNTER
----- Message from Edmond Wells MA sent at 11/27/2023  2:39 PM CST -----  Regarding: EMG Study  Good Afternoon,    Could I get some assist with scheduling an  nerve conduction studies/EMG for tarsal tunnel.    Thanks!  Edmond Wells MS, [facility]  Orthopedic Clinical / OR Assistant to Dr. Simone Menjivar

## 2024-01-09 DIAGNOSIS — I10 ESSENTIAL HYPERTENSION: ICD-10-CM

## 2024-01-09 DIAGNOSIS — Z86.73 HISTORY OF STROKE: ICD-10-CM

## 2024-01-09 RX ORDER — CARVEDILOL 25 MG/1
25 TABLET ORAL 2 TIMES DAILY WITH MEALS
Qty: 180 TABLET | Refills: 2 | Status: SHIPPED | OUTPATIENT
Start: 2024-01-09

## 2024-01-09 RX ORDER — ATORVASTATIN CALCIUM 20 MG/1
20 TABLET, FILM COATED ORAL DAILY
Qty: 90 TABLET | Refills: 1 | Status: SHIPPED | OUTPATIENT
Start: 2024-01-09

## 2024-01-09 NOTE — TELEPHONE ENCOUNTER
No care due was identified.  [facility] Embedded Care Due Messages. Reference number: [id number].   1/09/2024 8:50:23 AM CST

## 2024-01-10 NOTE — TELEPHONE ENCOUNTER
Refill Decision Note   Lori Burgess  is requesting a refill authorization.  Brief Assessment and Rationale for Refill:  Approve     Medication Therapy Plan:        Comments:     Note composed:7:30 PM 01/09/2024

## 2024-03-15 DIAGNOSIS — I10 ESSENTIAL HYPERTENSION: ICD-10-CM

## 2024-03-15 RX ORDER — NIFEDIPINE 90 MG/1
90 TABLET, EXTENDED RELEASE ORAL DAILY
Qty: 90 TABLET | Refills: 0 | Status: SHIPPED | OUTPATIENT
Start: 2024-03-15 | End: 2024-04-16 | Stop reason: SDUPTHER

## 2024-03-15 RX ORDER — NIFEDIPINE 90 MG/1
TABLET, EXTENDED RELEASE ORAL
Qty: 30 TABLET | Refills: 0 | OUTPATIENT
Start: 2024-03-15

## 2024-03-15 NOTE — TELEPHONE ENCOUNTER
Care Due:                  Date            Visit Type   Department     Provider  --------------------------------------------------------------------------------                                EP -                              PRIMARY      [facility] FAMILY  Last Visit: 09-      CARE ([facility])   MEDICINE       Anna Dos Santos  Next Visit: None Scheduled  None         None Found                                                            Last  Test          Frequency    Reason                     Performed    Due Date  --------------------------------------------------------------------------------    Lipid Panel.  12 months..  atorvastatin.............  05- 05-    [facility] Embedded Care Due Messages. Reference number: [id number].   3/15/2024 11:05:06 AM CDT

## 2024-03-20 ENCOUNTER — OFFICE VISIT (OUTPATIENT)
Dept: OBSTETRICS AND GYNECOLOGY | Facility: CLINIC | Age: 59
End: 2024-03-20
Payer: COMMERCIAL

## 2024-03-20 VITALS
BODY MASS INDEX: 33.21 KG/M2 | DIASTOLIC BLOOD PRESSURE: 73 MMHG | WEIGHT: 238.13 LBS | SYSTOLIC BLOOD PRESSURE: 130 MMHG

## 2024-03-20 DIAGNOSIS — Z01.419 WELL WOMAN EXAM WITH ROUTINE GYNECOLOGICAL EXAM: Primary | ICD-10-CM

## 2024-03-20 PROCEDURE — 88175 CYTOPATH C/V AUTO FLUID REDO: CPT | Performed by: STUDENT IN AN ORGANIZED HEALTH CARE EDUCATION/TRAINING PROGRAM

## 2024-03-20 PROCEDURE — 1159F MED LIST DOCD IN RCRD: CPT | Mod: CPTII,S$GLB,, | Performed by: STUDENT IN AN ORGANIZED HEALTH CARE EDUCATION/TRAINING PROGRAM

## 2024-03-20 PROCEDURE — 3008F BODY MASS INDEX DOCD: CPT | Mod: CPTII,S$GLB,, | Performed by: STUDENT IN AN ORGANIZED HEALTH CARE EDUCATION/TRAINING PROGRAM

## 2024-03-20 PROCEDURE — 3075F SYST BP GE 130 - 139MM HG: CPT | Mod: CPTII,S$GLB,, | Performed by: STUDENT IN AN ORGANIZED HEALTH CARE EDUCATION/TRAINING PROGRAM

## 2024-03-20 PROCEDURE — 99386 PREV VISIT NEW AGE 40-64: CPT | Mod: S$GLB,,, | Performed by: STUDENT IN AN ORGANIZED HEALTH CARE EDUCATION/TRAINING PROGRAM

## 2024-03-20 PROCEDURE — 99999 PR PBB SHADOW E&M-EST. PATIENT-LVL III: CPT | Mod: PBBFAC,,, | Performed by: STUDENT IN AN ORGANIZED HEALTH CARE EDUCATION/TRAINING PROGRAM

## 2024-03-20 PROCEDURE — 3078F DIAST BP <80 MM HG: CPT | Mod: CPTII,S$GLB,, | Performed by: STUDENT IN AN ORGANIZED HEALTH CARE EDUCATION/TRAINING PROGRAM

## 2024-03-20 PROCEDURE — 87624 HPV HI-RISK TYP POOLED RSLT: CPT | Performed by: STUDENT IN AN ORGANIZED HEALTH CARE EDUCATION/TRAINING PROGRAM

## 2024-03-20 NOTE — PROGRESS NOTES
CC: Well woman exam    HPI:  Lori Burgess is a 58 y.o. female  presents for a well woman exam.  She has no issues, problems, or complaints.      Patient history:   Past Medical History:   Diagnosis Date    Anemia     Diverticulosis     History of gastroesophageal reflux (GERD)     Hyperlipidemia     Hypertension     Stroke 2015    Trauma 1998    Fractured Right ankle     Past Surgical History:   Procedure Laterality Date    BARTHOLIN GLAND CYST EXCISION      Marsupialization, not excision    COLONOSCOPY      COLONOSCOPY N/A 2023    Procedure: COLONOSCOPY;  Surgeon: Sam Ospina MD;  Location: [facility];  Service: Endoscopy;  Laterality: N/A;    HYSTERECTOMY  10/2006    Laparoscopic Supracervical Hysterectomy    TUBAL LIGATION       OB History    Para Term  AB Living   3 3 3     3   SAB IAB Ectopic Multiple Live Births           3      # Outcome Date GA Lbr Lion/2nd Weight Sex Delivery Anes PTL Lv   3 Term      Vag-Spont  N KAYLYN   2 Term      Vag-Spont  N KAYLYN   1 Term      Vag-Spont  N KAYLYN       GYN  Menopausal: Yes  History of abnormal paps: DENIES  Abnormal or postmenopausal bleeding: DENIES  History of abnormal mammograms:DENIES   Family history of breast or ovarian cancer: DENIES  Any breast masses, pain, skin changes, or nipple discharge: DENIES  Possible recent STD exposure: denies  Contraception: s/p hysterectomy    Pap: No result found, Done today (supracervical hyst  for bleeding)  Mammogram: BIRADS1, T-C=5.22% 2023      Family History   Problem Relation Age of Onset    Colon cancer Mother 55    Hypertension Mother     Diabetes Father     Hypertension Father     Hypertension Brother     Hypertension Sister     Heart disease Neg Hx     Heart attack Neg Hx     Breast cancer Neg Hx     Ovarian cancer Neg Hx      Social History     Tobacco Use    Smoking status: Never    Smokeless tobacco: Never   Substance Use Topics    Alcohol use: Not Currently    Drug use: No      Allergies: Ace inhibitors, Benicar [olmesartan], Methyldopa, and Clonidine    Current Outpatient Medications:     atorvastatin (LIPITOR) 20 MG tablet, Take 1 tablet (20 mg total) by mouth once daily., Disp: 90 tablet, Rfl: 1    carvediloL (COREG) 25 MG tablet, TAKE 1 TABLET BY MOUTH TWICE DAILY WITH MEALS, Disp: 180 tablet, Rfl: 2    naproxen (NAPROSYN) 500 MG tablet, Take 1 tablet (500 mg total) by mouth 2 (two) times daily with meals., Disp: 10 tablet, Rfl: 0    NIFEdipine (PROCARDIA-XL) 90 MG (OSM) 24 hr tablet, Take 1 tablet (90 mg total) by mouth once daily., Disp: 90 tablet, Rfl: 0    orphenadrine (NORFLEX) 100 mg tablet, Take 1 tablet (100 mg total) by mouth 2 (two) times daily., Disp: 10 tablet, Rfl: 0    spironolactone (ALDACTONE) 50 MG tablet, Take 1 tablet (50 mg total) by mouth 2 (two) times daily., Disp: 60 tablet, Rfl: 11    aspirin (ECOTRIN) 325 MG EC tablet, Take 1 tablet (325 mg total) by mouth once daily., Disp: , Rfl: 0       ROS:  GENERAL: Denies weight gain or weight loss. Feeling well overall.   SKIN: Denies rash or lesions.   HEAD: Denies head injury or headache.   NODES: Denies enlarged lymph nodes.   CHEST: Denies chest pain or shortness of breath.   CARDIOVASCULAR: Denies palpitations or left sided chest pain.   ABDOMEN: No abdominal pain, constipation, diarrhea, nausea, vomiting or rectal bleeding.   URINARY: No frequency, dysuria, hematuria, or burning on urination.  REPRODUCTIVE: See HPI.   BREASTS: The patient performs breast self-examination and denies pain, lumps, or nipple discharge.   HEMATOLOGIC: No easy bruisability or excessive bleeding.  MUSCULOSKELETAL: Denies joint pain or swelling.   NEUROLOGIC: Denies syncope or weakness.   PSYCHIATRIC: Denies depression, anxiety or mood swings.    Objective:   /73   Wt 108 kg (238 lb 1.6 oz)   LMP  (LMP Unknown)   BMI 33.21 kg/m²       Physical Exam:  APPEARANCE: Well nourished, well developed, in no acute distress.  AFFECT:  WNL, alert and oriented x 3  SKIN: No acne or hirsutism  NECK: Neck symmetric without masses or thyromegaly  CHEST: Good respiratory effect  ABDOMEN: Soft.  No tenderness or masses.  No hepatosplenomegaly.  No hernias.  BREASTS: Symmetrical, no skin changes or visible lesions.  No palpable masses, nipple discharge bilaterally.  PELVIC: Normal external genitalia without lesions.  Normal hair distribution.  Adequate perineal body, normal urethral meatus.  Vagina atrophic without lesions or discharge.  Cervix pink, without lesions, discharge or tenderness.  No significant cystocele or rectocele.  Bimanual exam no masses and nontender.  Adnexa without masses or tenderness.   EXTREMITIES: No edema.    ASSESSMENT AND PLAN  1. Well woman exam with routine gynecological exam  Liquid-Based Pap Smear, Screening    HPV High Risk Genotypes, PCR          Annual exam  Breast and pelvic exam: wnl  Patient counseled on ASCCP guidelines for cervical cytology screening  Cervical screening: completed today   Patient counseled on current recommendations for breast cancer screening  Mammogram screening: due in August   STD testing: not requested today   Osteoporosis screening at 65      She was counseled to follow up with her PCP for other routine health maintenance      Follow up in about 1 year (around 3/20/2025).      Elana Silvestre MD  OBGYN [facility]

## 2024-03-21 ENCOUNTER — OFFICE VISIT (OUTPATIENT)
Dept: PODIATRY | Facility: CLINIC | Age: 59
End: 2024-03-21
Payer: COMMERCIAL

## 2024-03-21 VITALS
HEART RATE: 59 BPM | BODY MASS INDEX: 33.21 KG/M2 | SYSTOLIC BLOOD PRESSURE: 146 MMHG | HEIGHT: 71 IN | DIASTOLIC BLOOD PRESSURE: 86 MMHG

## 2024-03-21 DIAGNOSIS — M76.61 ACHILLES TENDINITIS OF RIGHT LOWER EXTREMITY: ICD-10-CM

## 2024-03-21 DIAGNOSIS — M21.40 PES PLANUS, UNSPECIFIED LATERALITY: ICD-10-CM

## 2024-03-21 DIAGNOSIS — M76.829 PTTD (POSTERIOR TIBIAL TENDON DYSFUNCTION): Primary | ICD-10-CM

## 2024-03-21 PROCEDURE — 99999 PR PBB SHADOW E&M-EST. PATIENT-LVL IV: CPT | Mod: PBBFAC,,, | Performed by: STUDENT IN AN ORGANIZED HEALTH CARE EDUCATION/TRAINING PROGRAM

## 2024-03-21 PROCEDURE — 99203 OFFICE O/P NEW LOW 30 MIN: CPT | Mod: S$GLB,,, | Performed by: STUDENT IN AN ORGANIZED HEALTH CARE EDUCATION/TRAINING PROGRAM

## 2024-03-21 PROCEDURE — 3077F SYST BP >= 140 MM HG: CPT | Mod: CPTII,S$GLB,, | Performed by: STUDENT IN AN ORGANIZED HEALTH CARE EDUCATION/TRAINING PROGRAM

## 2024-03-21 PROCEDURE — 3079F DIAST BP 80-89 MM HG: CPT | Mod: CPTII,S$GLB,, | Performed by: STUDENT IN AN ORGANIZED HEALTH CARE EDUCATION/TRAINING PROGRAM

## 2024-03-21 PROCEDURE — 1159F MED LIST DOCD IN RCRD: CPT | Mod: CPTII,S$GLB,, | Performed by: STUDENT IN AN ORGANIZED HEALTH CARE EDUCATION/TRAINING PROGRAM

## 2024-03-21 PROCEDURE — 3008F BODY MASS INDEX DOCD: CPT | Mod: CPTII,S$GLB,, | Performed by: STUDENT IN AN ORGANIZED HEALTH CARE EDUCATION/TRAINING PROGRAM

## 2024-03-21 RX ORDER — METHYLPREDNISOLONE 4 MG/1
TABLET ORAL
Qty: 1 EACH | Refills: 0 | Status: SHIPPED | OUTPATIENT
Start: 2024-03-21 | End: 2024-04-11

## 2024-03-21 RX ORDER — DICLOFENAC SODIUM 10 MG/G
2 GEL TOPICAL 4 TIMES DAILY
Qty: 100 G | Refills: 1 | Status: SHIPPED | OUTPATIENT
Start: 2024-03-21

## 2024-03-21 NOTE — LETTER
March 21, 2024      [facility]  [address]  [address]  [place] [address]  Phone: [phone]  Fax: [phone]       Patient: Lori Burgess   YOB: 1965  Date of Visit: 03/21/2024    To Whom It May Concern:    Rodrigo Burgess  was at [facility] on 03/21/2024. The patient may return to work on 3/22/24 with no restrictions. If you have any questions or concerns, or if I can be of further assistance, please do not hesitate to contact me.    Sincerely,    [place] [employer]

## 2024-03-21 NOTE — PROGRESS NOTES
Subjective:     Patient ID: Lori Burgess is a 58 y.o. female.    Chief Complaint: Foot Problem (Bilateral, left ankle, right ft., xray done by ortho November 2023) and Foot Pain    Lori is a 58 y.o. female who presents to the podiatry clinic  with complaint of  bilateral foot pain. Relates to pain to left foot, points to inside of ankle of LLE, and to back of foot, points to back of ankle of RLE. Onset of the symptoms was several months ago. Precipitating event: none known. Current symptoms include: worsening symptoms after a period of activity. Aggravating factors: any weight bearing. Symptoms have gradually worsened. Patient has had no prior foot problems. Evaluation to date: none. Treatment to date: none. Patients rates pain 10/10 on pain scale. Relates left side is much worse.     Review of Systems   Constitutional: Negative for chills, decreased appetite, diaphoresis and fever.   HENT:  Negative for congestion and hearing loss.    Cardiovascular:  Negative for chest pain, claudication, leg swelling and syncope.   Respiratory:  Negative for cough and shortness of breath.    Skin:  Negative for color change, dry skin, flushing, itching, nail changes, poor wound healing and rash.   Musculoskeletal:  Positive for arthritis, back pain and joint pain. Negative for joint swelling.   Gastrointestinal:  Negative for nausea and vomiting.   Neurological:  Positive for numbness and paresthesias. Negative for focal weakness and weakness.   Psychiatric/Behavioral:  Negative for altered mental status. The patient is not nervous/anxious.         Objective:     Physical Exam  Constitutional:       General: She is not in acute distress.     Appearance: She is well-developed. She is not diaphoretic.   Cardiovascular:      Comments: Dorsalis pedis and posterior tibial pulses are within normal limits. Skin temperature is within normal limits. Toes are cool to touch and feet are warm proximally. Hair growth is within normal  limits. Skin is normotrophic and without hyperpigmentation. No edema noted. No spider veins or varicosities noted, bilaterally.   Musculoskeletal:         General: Tenderness present.      Comments: Adequate joint range of motion without pain, limitation, nor crepitation to bilateral feet and ankle joints. Muscle strength is 5/5 in all groups bilaterally.    Tenderness to left foot along course of posterior tibial tendon just posterior and distal to medial malleolus. Mild tenderness to posterior right calcaneus at insertion of tendo achilles.    Pes planus bilaterally    Lymphadenopathy:      Comments: Negative lymphangitic streaking    Skin:     General: Skin is warm and dry.      Findings: No lesion.      Comments: Skin is warm and dry, no acute signs of infection noted. No open wounds, macerations or hyperkeratotic lesions, bilaterally.     Toenails are well trimmed and of normal morphology, bilaterally.    Neurological:      Mental Status: She is alert and oriented to person, place, and time.      Sensory: No sensory deficit.      Motor: No abnormal muscle tone.      Comments: Light touch within normal limits.    Psychiatric:         Behavior: Behavior normal.         Thought Content: Thought content normal.         Judgment: Judgment normal.           Assessment:      Encounter Diagnoses   Name Primary?    PTTD (posterior tibial tendon dysfunction) Yes    Pes planus, unspecified laterality     Achilles tendinitis of right lower extremity      Plan:     Lori was seen today for foot problem and foot pain.    Diagnoses and all orders for this visit:    PTTD (posterior tibial tendon dysfunction)  -     NON-PNEUMATIC WALKING BOOT FOR HOME USE    Pes planus, unspecified laterality  -     NON-PNEUMATIC WALKING BOOT FOR HOME USE    Achilles tendinitis of right lower extremity  -     Ambulatory referral/consult to Physical/Occupational Therapy; Future    Other orders  -     diclofenac sodium (VOLTAREN) 1 % Gel; Apply  2 g topically 4 (four) times daily.  -     methylPREDNISolone (MEDROL DOSEPACK) 4 mg tablet; use as directed      I counseled the patient on her conditions, their implications and medical management.  Previous xray reviewed with pes planus deformity noted.   Recommend boot for LLE, she is to wear at all times while ambulating. Tall boot dispensed today, she is to wear with supportive tennis shoe to opposite right extremity with heel lift to RLE    Recommend RICE  The patient was advised that NSAID-type medications have two very important potential side effects: gastrointestinal irritation including hemorrhage and renal injuries. She was asked to take the medication with food and to stop if she experiences any GI upset. I asked her to call for vomiting, abdominal pain or black/bloody stools. The patient expresses understanding of these issues and questions were answered.   Referral PT placed, stretching and strengthening exercises dispensed  Return to clinic in 6-8 weeks, sooner PRN

## 2024-03-28 LAB
CLINICAL INFO: ABNORMAL
CYTO CVX: ABNORMAL
CYTOLOGIST CVX/VAG CYTO: ABNORMAL
CYTOLOGIST CVX/VAG CYTO: ABNORMAL
CYTOLOGY CMNT CVX/VAG CYTO-IMP: ABNORMAL
CYTOLOGY PAP THIN PREP EXPLANATION: ABNORMAL
DATE OF PREVIOUS PAP: ABNORMAL
DATE PREVIOUS BX: NO
GEN CATEG CVX/VAG CYTO-IMP: ABNORMAL
HPV I/H RISK 4 DNA CVX QL NAA+PROBE: NOT DETECTED
LMP START DATE: ABNORMAL
MICROORGANISM CVX/VAG CYTO: ABNORMAL
PATHOLOGIST CVX/VAG CYTO: ABNORMAL
SERVICE CMNT-IMP: ABNORMAL
SPECIMEN SOURCE CVX/VAG CYTO: ABNORMAL
STAT OF ADQ CVX/VAG CYTO-IMP: ABNORMAL

## 2024-04-02 ENCOUNTER — TELEPHONE (OUTPATIENT)
Dept: OBSTETRICS AND GYNECOLOGY | Facility: CLINIC | Age: 59
End: 2024-04-02
Payer: COMMERCIAL

## 2024-04-02 NOTE — TELEPHONE ENCOUNTER
----- Message from Jesenia Lyle MA sent at 4/2/2024  2:09 PM CDT -----    ----- Message -----  From: Elana Silvestre MD  Sent: 3/28/2024   7:21 PM CDT  To: [facility] Ob[facility]yn Scheduling; Elana Silvestre Staff    Can you reach out to patient to help her schedule colpo? We can do it at [facility] or [place], whatever she prefers. Thanks!

## 2024-04-03 ENCOUNTER — PATIENT MESSAGE (OUTPATIENT)
Dept: PODIATRY | Facility: CLINIC | Age: 59
End: 2024-04-03
Payer: COMMERCIAL

## 2024-04-10 DIAGNOSIS — E87.6 DIURETIC-INDUCED HYPOKALEMIA: ICD-10-CM

## 2024-04-10 DIAGNOSIS — T50.2X5A DIURETIC-INDUCED HYPOKALEMIA: ICD-10-CM

## 2024-04-10 NOTE — TELEPHONE ENCOUNTER
No care due was identified.  [facility] Embedded Care Due Messages. Reference number: [id number].   4/10/2024 12:01:21 PM CDT

## 2024-04-11 RX ORDER — POTASSIUM CHLORIDE 20 MEQ/1
20 TABLET, EXTENDED RELEASE ORAL 2 TIMES DAILY
Qty: 60 TABLET | Refills: 0 | OUTPATIENT
Start: 2024-04-11

## 2024-04-11 NOTE — TELEPHONE ENCOUNTER
Refill Decision Note   Lori Aditya  is requesting a refill authorization.  Brief Assessment and Rationale for Refill:  Quick Discontinue     Medication Therapy Plan:  Med d/c on 09/01/23 by PCP; [facility]      Comments:     Note composed:9:46 AM 04/11/2024

## 2024-04-16 ENCOUNTER — PATIENT MESSAGE (OUTPATIENT)
Dept: PODIATRY | Facility: CLINIC | Age: 59
End: 2024-04-16
Payer: COMMERCIAL

## 2024-04-16 ENCOUNTER — OFFICE VISIT (OUTPATIENT)
Dept: FAMILY MEDICINE | Facility: CLINIC | Age: 59
End: 2024-04-16
Payer: COMMERCIAL

## 2024-04-16 ENCOUNTER — TELEPHONE (OUTPATIENT)
Dept: PODIATRY | Facility: CLINIC | Age: 59
End: 2024-04-16
Payer: COMMERCIAL

## 2024-04-16 ENCOUNTER — PATIENT MESSAGE (OUTPATIENT)
Dept: FAMILY MEDICINE | Facility: CLINIC | Age: 59
End: 2024-04-16

## 2024-04-16 ENCOUNTER — LAB VISIT (OUTPATIENT)
Dept: LAB | Facility: HOSPITAL | Age: 59
End: 2024-04-16
Attending: FAMILY MEDICINE
Payer: COMMERCIAL

## 2024-04-16 VITALS
WEIGHT: 238.13 LBS | BODY MASS INDEX: 33.34 KG/M2 | SYSTOLIC BLOOD PRESSURE: 120 MMHG | OXYGEN SATURATION: 99 % | HEIGHT: 71 IN | HEART RATE: 55 BPM | DIASTOLIC BLOOD PRESSURE: 82 MMHG

## 2024-04-16 DIAGNOSIS — Z00.00 ANNUAL PHYSICAL EXAM: ICD-10-CM

## 2024-04-16 DIAGNOSIS — I10 PRIMARY HYPERTENSION: ICD-10-CM

## 2024-04-16 DIAGNOSIS — I10 ESSENTIAL HYPERTENSION: ICD-10-CM

## 2024-04-16 DIAGNOSIS — R00.1 BRADYCARDIA: ICD-10-CM

## 2024-04-16 DIAGNOSIS — E87.6 DIURETIC-INDUCED HYPOKALEMIA: ICD-10-CM

## 2024-04-16 DIAGNOSIS — E66.09 CLASS 1 OBESITY DUE TO EXCESS CALORIES WITH SERIOUS COMORBIDITY AND BODY MASS INDEX (BMI) OF 33.0 TO 33.9 IN ADULT: ICD-10-CM

## 2024-04-16 DIAGNOSIS — Z86.73 HISTORY OF STROKE: ICD-10-CM

## 2024-04-16 DIAGNOSIS — R73.03 PREDIABETES: ICD-10-CM

## 2024-04-16 DIAGNOSIS — M76.62 ACHILLES TENDINITIS OF LEFT LOWER EXTREMITY: ICD-10-CM

## 2024-04-16 DIAGNOSIS — T50.2X5A DIURETIC-INDUCED HYPOKALEMIA: ICD-10-CM

## 2024-04-16 DIAGNOSIS — Z76.89 ENCOUNTER TO ESTABLISH CARE WITH NEW DOCTOR: Primary | ICD-10-CM

## 2024-04-16 PROBLEM — R21 RASH/SKIN ERUPTION: Status: RESOLVED | Noted: 2020-09-02 | Resolved: 2024-04-16

## 2024-04-16 LAB
ALBUMIN SERPL BCP-MCNC: 3.5 G/DL (ref 3.5–5.2)
ALP SERPL-CCNC: 85 U/L (ref 55–135)
ALT SERPL W/O P-5'-P-CCNC: 17 U/L (ref 10–44)
ANION GAP SERPL CALC-SCNC: 8 MMOL/L (ref 8–16)
AST SERPL-CCNC: 14 U/L (ref 10–40)
BASOPHILS # BLD AUTO: 0.02 K/UL (ref 0–0.2)
BASOPHILS NFR BLD: 0.4 % (ref 0–1.9)
BILIRUB SERPL-MCNC: 0.8 MG/DL (ref 0.1–1)
BUN SERPL-MCNC: 14 MG/DL (ref 6–20)
CALCIUM SERPL-MCNC: 9.5 MG/DL (ref 8.7–10.5)
CHLORIDE SERPL-SCNC: 107 MMOL/L (ref 95–110)
CHOLEST SERPL-MCNC: 131 MG/DL (ref 120–199)
CHOLEST/HDLC SERPL: 2.7 {RATIO} (ref 2–5)
CO2 SERPL-SCNC: 26 MMOL/L (ref 23–29)
CREAT SERPL-MCNC: 0.9 MG/DL (ref 0.5–1.4)
DIFFERENTIAL METHOD BLD: ABNORMAL
EOSINOPHIL # BLD AUTO: 0.2 K/UL (ref 0–0.5)
EOSINOPHIL NFR BLD: 3.3 % (ref 0–8)
ERYTHROCYTE [DISTWIDTH] IN BLOOD BY AUTOMATED COUNT: 15.4 % (ref 11.5–14.5)
EST. GFR  (NO RACE VARIABLE): >60 ML/MIN/1.73 M^2
ESTIMATED AVG GLUCOSE: 117 MG/DL (ref 68–131)
GLUCOSE SERPL-MCNC: 99 MG/DL (ref 70–110)
HBA1C MFR BLD: 5.7 % (ref 4–5.6)
HCT VFR BLD AUTO: 39.1 % (ref 37–48.5)
HDLC SERPL-MCNC: 49 MG/DL (ref 40–75)
HDLC SERPL: 37.4 % (ref 20–50)
HGB BLD-MCNC: 12.2 G/DL (ref 12–16)
IMM GRANULOCYTES # BLD AUTO: 0.01 K/UL (ref 0–0.04)
IMM GRANULOCYTES NFR BLD AUTO: 0.2 % (ref 0–0.5)
LDLC SERPL CALC-MCNC: 72.6 MG/DL (ref 63–159)
LYMPHOCYTES # BLD AUTO: 1.6 K/UL (ref 1–4.8)
LYMPHOCYTES NFR BLD: 32.2 % (ref 18–48)
MCH RBC QN AUTO: 24.5 PG (ref 27–31)
MCHC RBC AUTO-ENTMCNC: 31.2 G/DL (ref 32–36)
MCV RBC AUTO: 79 FL (ref 82–98)
MONOCYTES # BLD AUTO: 0.4 K/UL (ref 0.3–1)
MONOCYTES NFR BLD: 7.9 % (ref 4–15)
NEUTROPHILS # BLD AUTO: 2.7 K/UL (ref 1.8–7.7)
NEUTROPHILS NFR BLD: 56 % (ref 38–73)
NONHDLC SERPL-MCNC: 82 MG/DL
NRBC BLD-RTO: 0 /100 WBC
PLATELET # BLD AUTO: 245 K/UL (ref 150–450)
PMV BLD AUTO: 11.4 FL (ref 9.2–12.9)
POTASSIUM SERPL-SCNC: 4.1 MMOL/L (ref 3.5–5.1)
PROT SERPL-MCNC: 7.7 G/DL (ref 6–8.4)
RBC # BLD AUTO: 4.97 M/UL (ref 4–5.4)
SODIUM SERPL-SCNC: 141 MMOL/L (ref 136–145)
TRIGL SERPL-MCNC: 47 MG/DL (ref 30–150)
TSH SERPL DL<=0.005 MIU/L-ACNC: 0.89 UIU/ML (ref 0.4–4)
WBC # BLD AUTO: 4.82 K/UL (ref 3.9–12.7)

## 2024-04-16 PROCEDURE — 80061 LIPID PANEL: CPT | Performed by: FAMILY MEDICINE

## 2024-04-16 PROCEDURE — 99999 PR PBB SHADOW E&M-EST. PATIENT-LVL III: CPT | Mod: PBBFAC,,, | Performed by: FAMILY MEDICINE

## 2024-04-16 PROCEDURE — 36415 COLL VENOUS BLD VENIPUNCTURE: CPT | Performed by: FAMILY MEDICINE

## 2024-04-16 PROCEDURE — 3008F BODY MASS INDEX DOCD: CPT | Mod: CPTII,S$GLB,, | Performed by: FAMILY MEDICINE

## 2024-04-16 PROCEDURE — 84443 ASSAY THYROID STIM HORMONE: CPT | Performed by: FAMILY MEDICINE

## 2024-04-16 PROCEDURE — 1159F MED LIST DOCD IN RCRD: CPT | Mod: CPTII,S$GLB,, | Performed by: FAMILY MEDICINE

## 2024-04-16 PROCEDURE — 99396 PREV VISIT EST AGE 40-64: CPT | Mod: S$GLB,,, | Performed by: FAMILY MEDICINE

## 2024-04-16 PROCEDURE — 3074F SYST BP LT 130 MM HG: CPT | Mod: CPTII,S$GLB,, | Performed by: FAMILY MEDICINE

## 2024-04-16 PROCEDURE — 80053 COMPREHEN METABOLIC PANEL: CPT | Performed by: FAMILY MEDICINE

## 2024-04-16 PROCEDURE — 3079F DIAST BP 80-89 MM HG: CPT | Mod: CPTII,S$GLB,, | Performed by: FAMILY MEDICINE

## 2024-04-16 PROCEDURE — 83036 HEMOGLOBIN GLYCOSYLATED A1C: CPT | Performed by: FAMILY MEDICINE

## 2024-04-16 PROCEDURE — 85025 COMPLETE CBC W/AUTO DIFF WBC: CPT | Performed by: FAMILY MEDICINE

## 2024-04-16 RX ORDER — SPIRONOLACTONE 50 MG/1
50 TABLET, FILM COATED ORAL 2 TIMES DAILY
Qty: 60 TABLET | Refills: 11 | Status: SHIPPED | OUTPATIENT
Start: 2024-04-16 | End: 2025-04-16

## 2024-04-16 RX ORDER — ATORVASTATIN CALCIUM 20 MG/1
20 TABLET, FILM COATED ORAL DAILY
Qty: 90 TABLET | Refills: 3 | Status: SHIPPED | OUTPATIENT
Start: 2024-04-16

## 2024-04-16 RX ORDER — NIFEDIPINE 90 MG/1
90 TABLET, EXTENDED RELEASE ORAL DAILY
Qty: 90 TABLET | Refills: 3 | Status: SHIPPED | OUTPATIENT
Start: 2024-04-16

## 2024-04-16 RX ORDER — CARVEDILOL 25 MG/1
25 TABLET ORAL 2 TIMES DAILY WITH MEALS
Qty: 180 TABLET | Refills: 3 | Status: SHIPPED | OUTPATIENT
Start: 2024-04-16

## 2024-04-16 NOTE — PROGRESS NOTES
(Portions of this note were dictated using voice recognition software and may contain dictation related errors in spelling/grammar/syntax not found on text review)    CC:   Chief Complaint   Patient presents with    [facility]     Pt is fasting        HPI: 58 y.o. female presented to [facility] as a new patient for routine annual checkup and labs.  She has medical history significant for prediabetes, essential hypertension, diuretic-induced hypokalemia, hyperlipidemia, history of stroke.    HTN:  Blood pressure has been stable with Procardia 90 mg, carvedilol 25 mg and Aldactone 50 mg, reports adherence with medications, no side effects, needs medication refills on all of them    Hypokalemia:  She has diuretic-induced hypokalemia, takes potassium supplements every day, has been out of the refills, does not know about the dose    Achilles tendonitis:  She follows up with Podiatry, currently wearing walking boot for 6 weeks, states it has been 3 weeks already, reports having ankle pain with physical exertion.  She works in mail delivery and walks a lot, recently has been doing in office job    HLD:  She takes atorvastatin 20 mg daily, reports adherence, need refills.      She is due for annual labs   She does not smoke, has no toxic habits   She is physically active for his work.    She denies having any other symptoms or concerns.    Past Medical History:   Diagnosis Date    Anemia     Diverticulosis     History of gastroesophageal reflux (GERD)     Hyperlipidemia     Hypertension     Rash/skin eruption 09/02/2020    Stroke 2015    Trauma 1998    Fractured Right ankle       Past Surgical History:   Procedure Laterality Date    BARTHOLIN GLAND CYST EXCISION      Marsupialization, not excision    COLONOSCOPY      COLONOSCOPY N/A 4/17/2023    Procedure: COLONOSCOPY;  Surgeon: Sam Ospina MD;  Location: [facility];  Service: Endoscopy;  Laterality: N/A;    HYSTERECTOMY  10/2006    Laparoscopic Supracervical  Hysterectomy    TUBAL LIGATION         Family History   Problem Relation Name Age of Onset    Colon cancer Mother  55    Hypertension Mother      Diabetes Father      Hypertension Father      Hypertension Brother      Hypertension Sister      Heart disease Neg Hx      Heart attack Neg Hx      Breast cancer Neg Hx      Ovarian cancer Neg Hx         Social History     Tobacco Use    Smoking status: Never    Smokeless tobacco: Never   Substance Use Topics    Alcohol use: Not Currently    Drug use: No       Lab Results   Component Value Date    WBC 6.45 08/04/2023    HGB 11.4 (L) 08/04/2023    HCT 35.9 (L) 08/04/2023    MCV 77 (L) 08/04/2023     08/04/2023    CHOL 136 05/19/2023    TRIG 61 05/19/2023    HDL 47 05/19/2023    ALT 17 08/25/2023    AST 14 08/25/2023    BILITOT 0.6 08/25/2023    ALKPHOS 81 08/25/2023     08/25/2023    K 3.0 (L) 08/25/2023     08/25/2023    CREATININE 0.9 08/25/2023    ESTGFRAFRICA >60.0 03/04/2022    EGFRNONAA >60.0 03/04/2022    CALCIUM 9.4 08/25/2023    ALBUMIN 3.5 08/25/2023    BUN 13 08/25/2023    CO2 29 08/25/2023    TSH 1.444 05/19/2023    INR 1.0 08/09/2015    HGBA1C 5.8 (H) 05/19/2023    LDLCALC 76.8 05/19/2023     08/25/2023    BULKVDCK70RL 19 (L) 04/25/2017             Vital signs reviewed  PE:   APPEARANCE: Well nourished, well developed, in no acute distress.    HEAD: Normocephalic, atraumatic.  EYES: EOMI.  Conjunctivae noninjected.  NOSE: Mucosa pink. Airway clear.  MOUTH & THROAT: No tonsillar enlargement. No pharyngeal erythema or exudate.   NECK: Supple with no cervical lymphadenopathy.    CHEST: Good inspiratory effort. Lungs clear to auscultation with no wheezes or crackles.  CARDIOVASCULAR: Normal S1, S2. No rubs, murmurs, or gallops.  ABDOMEN: Bowel sounds normal. Not distended. Soft. No tenderness or masses. No organomegaly.  EXTREMITIES: No edema, cyanosis, or clubbing.    Review of Systems   Constitutional:  Negative for chills, fatigue and  fever.   HENT: Negative.     Respiratory:  Negative for cough, shortness of breath and wheezing.    Cardiovascular: Negative.    Gastrointestinal: Negative.    Genitourinary: Negative.    Neurological: Negative.    Psychiatric/Behavioral: Negative.     All other systems reviewed and are negative.      IMPRESSION  1. Encounter to establish care with new doctor    2. Essential hypertension    3. Diuretic-induced hypokalemia    4. Primary hypertension    5. History of stroke    6. Annual physical exam    7. Class 1 obesity due to excess calories with serious comorbidity and body mass index (BMI) of 33.0 to 33.9 in adult    8. Bradycardia    9. Prediabetes    10. Achilles tendinitis of left lower extremity            PLAN      1. Essential hypertension    - carvediloL (COREG) 25 MG tablet; Take 1 tablet (25 mg total) by mouth 2 (two) times daily with meals.  Dispense: 180 tablet; Refill: 3  - NIFEdipine (PROCARDIA-XL) 90 MG (OSM) 24 hr tablet; Take 1 tablet (90 mg total) by mouth once daily.  Dispense: 90 tablet; Refill: 3      2. Diuretic-induced hypokalemia    - spironolactone (ALDACTONE) 50 MG tablet; Take 1 tablet (50 mg total) by mouth 2 (two) times daily.  Dispense: 60 tablet; Refill: 11      3. Primary hypertension    - spironolactone (ALDACTONE) 50 MG tablet; Take 1 tablet (50 mg total) by mouth 2 (two) times daily.  Dispense: 60 tablet; Refill: 11      4. History of stroke    - atorvastatin (LIPITOR) 20 MG tablet; Take 1 tablet (20 mg total) by mouth once daily.  Dispense: 90 tablet; Refill: 3    On  mg ans statin      5. Encounter to establish care with new doctor        6. Annual physical exam    - Hemoglobin A1C; Future  - Lipid Panel; Future  - CBC Auto Differential; Future  - Comprehensive Metabolic Panel; Future  - TSH; Future      7. Class 1 obesity due to excess calories with serious comorbidity and body mass index (BMI) of 33.0 to 33.9 in adult    Counseling provided on healthy lifestyle,  encouraged to do moderate intensity regular exercise 30 minutes every day 5 days a week, to include vegetables and fruits and cut back on saturated fats and carbohydrates.       8. Bradycardia    Asymptomatic   Advised to maintain enough hydration   Advised to monitor heart rate at home      9. Prediabetes    Hba1c ordered    Advised on diet and exercise      10. Achilles tendinitis of left lower extremity    Followed by Podiatry         SCREENINGS      Immunizations:     Encouraged to have shingles and tetanus vaccine, denies for now         Age/demographic appropriate health maintenance:    Up-to-date with mammogram and Pap smear  Up-to-date with colonoscopy      There are no preventive care reminders to display for this patient.        Spent adequate time in obtaining history and explaining differentials     30 minutes spent during this visit of which greater than 50% devoted to face-face counseling and coordination of care regarding diagnosis and management plan       Anant Hoff   4/16/2024

## 2024-04-16 NOTE — TELEPHONE ENCOUNTER
----- Message from Beverly Santa sent at 4/16/2024  3:28 PM CDT -----  Type:  Patient Returning Call    Who Called:pt  Who Left Message for Patient:office  Does the patient know what this is regarding?:sooner appt  Would the patient rather a call back or a response via [facility]? call  Best Call Back Number:[phone]  Additional Information:

## 2024-04-16 NOTE — TELEPHONE ENCOUNTER
Pt contacted [facility] office to inform me forms are for continuous leave starting 04/01/24. I verbalized understanding and informed pt I would notify her once complete. Pt verbalized understanding and states she will pick forms up once complete.

## 2024-04-17 ENCOUNTER — OFFICE VISIT (OUTPATIENT)
Dept: PODIATRY | Facility: CLINIC | Age: 59
End: 2024-04-17
Payer: COMMERCIAL

## 2024-04-17 VITALS
WEIGHT: 238.13 LBS | DIASTOLIC BLOOD PRESSURE: 84 MMHG | SYSTOLIC BLOOD PRESSURE: 136 MMHG | HEART RATE: 59 BPM | HEIGHT: 71 IN | BODY MASS INDEX: 33.34 KG/M2

## 2024-04-17 DIAGNOSIS — M25.579 SINUS TARSITIS, UNSPECIFIED LATERALITY: ICD-10-CM

## 2024-04-17 DIAGNOSIS — M76.829 PTTD (POSTERIOR TIBIAL TENDON DYSFUNCTION): Primary | ICD-10-CM

## 2024-04-17 DIAGNOSIS — M21.40 PES PLANUS, UNSPECIFIED LATERALITY: ICD-10-CM

## 2024-04-17 PROCEDURE — 1159F MED LIST DOCD IN RCRD: CPT | Mod: CPTII,S$GLB,, | Performed by: STUDENT IN AN ORGANIZED HEALTH CARE EDUCATION/TRAINING PROGRAM

## 2024-04-17 PROCEDURE — 99214 OFFICE O/P EST MOD 30 MIN: CPT | Mod: S$GLB,,, | Performed by: STUDENT IN AN ORGANIZED HEALTH CARE EDUCATION/TRAINING PROGRAM

## 2024-04-17 PROCEDURE — 3008F BODY MASS INDEX DOCD: CPT | Mod: CPTII,S$GLB,, | Performed by: STUDENT IN AN ORGANIZED HEALTH CARE EDUCATION/TRAINING PROGRAM

## 2024-04-17 PROCEDURE — 3044F HG A1C LEVEL LT 7.0%: CPT | Mod: CPTII,S$GLB,, | Performed by: STUDENT IN AN ORGANIZED HEALTH CARE EDUCATION/TRAINING PROGRAM

## 2024-04-17 PROCEDURE — 99999 PR PBB SHADOW E&M-EST. PATIENT-LVL III: CPT | Mod: PBBFAC,,, | Performed by: STUDENT IN AN ORGANIZED HEALTH CARE EDUCATION/TRAINING PROGRAM

## 2024-04-17 PROCEDURE — 3079F DIAST BP 80-89 MM HG: CPT | Mod: CPTII,S$GLB,, | Performed by: STUDENT IN AN ORGANIZED HEALTH CARE EDUCATION/TRAINING PROGRAM

## 2024-04-17 PROCEDURE — 3075F SYST BP GE 130 - 139MM HG: CPT | Mod: CPTII,S$GLB,, | Performed by: STUDENT IN AN ORGANIZED HEALTH CARE EDUCATION/TRAINING PROGRAM

## 2024-04-17 NOTE — PROGRESS NOTES
Subjective:     Patient ID: Lori Burgess is a 58 y.o. female.    Chief Complaint: Foot Pain (She complains of burning pain along the sides of her left foot.)    Lori is a 58 y.o. female who presents to the podiatry clinic  with complaint of  bilateral foot pain. Relates to pain to left foot, points to inside of ankle of LLE, and to back of foot, points to back of ankle of RLE. Onset of the symptoms was several months ago. Precipitating event: none known. Current symptoms include: worsening symptoms after a period of activity. Aggravating factors: any weight bearing. Symptoms have gradually worsened. Patient has had no prior foot problems. Evaluation to date: none. Treatment to date: none. Patients rates pain 10/10 on pain scale. Relates left side is much worse.     4/17/24: Seen today, cameron has been in boot for 2 weeks. Cameron boot helps when she puts it on but when she is on her feet for a long time, pain comes back and is very severe. No further pedal complaints. Right foot is not bothering her today.    Review of Systems   Constitutional: Negative for chills, decreased appetite, diaphoresis and fever.   HENT:  Negative for congestion and hearing loss.    Cardiovascular:  Negative for chest pain, claudication, leg swelling and syncope.   Respiratory:  Negative for cough and shortness of breath.    Skin:  Negative for color change, dry skin, flushing, itching, nail changes, poor wound healing and rash.   Musculoskeletal:  Positive for arthritis, back pain and joint pain. Negative for joint swelling.   Gastrointestinal:  Negative for nausea and vomiting.   Neurological:  Positive for numbness and paresthesias. Negative for focal weakness and weakness.   Psychiatric/Behavioral:  Negative for altered mental status. The patient is not nervous/anxious.         Objective:     Physical Exam  Constitutional:       General: She is not in acute distress.     Appearance: She is well-developed. She is not  diaphoretic.   Cardiovascular:      Comments: Dorsalis pedis and posterior tibial pulses are within normal limits. Skin temperature is within normal limits. Toes are cool to touch and feet are warm proximally. Hair growth is within normal limits. Skin is normotrophic and without hyperpigmentation. No edema noted. No spider veins or varicosities noted, bilaterally.   Musculoskeletal:         General: Tenderness present.      Comments: Adequate joint range of motion without pain, limitation, nor crepitation to bilateral feet and ankle joints. Muscle strength is 5/5 in all groups bilaterally.    Tenderness to left foot along course of posterior tibial tendon just posterior and distal to medial malleolus. Tenderness to sinus tarsi and anteriolateral gutter of ankle joint.     Pes planus bilaterally    Lymphadenopathy:      Comments: Negative lymphangitic streaking    Skin:     General: Skin is warm and dry.      Findings: No lesion.      Comments: Skin is warm and dry, no acute signs of infection noted. No open wounds, macerations or hyperkeratotic lesions, bilaterally.     Toenails are well trimmed and of normal morphology, bilaterally.    Neurological:      Mental Status: She is alert and oriented to person, place, and time.      Sensory: No sensory deficit.      Motor: No abnormal muscle tone.      Comments: Light touch within normal limits.    Psychiatric:         Behavior: Behavior normal.         Thought Content: Thought content normal.         Judgment: Judgment normal.           Assessment:      Encounter Diagnoses   Name Primary?    PTTD (posterior tibial tendon dysfunction) Yes    Pes planus, unspecified laterality     Sinus tarsitis, unspecified laterality        Plan:     Lori was seen today for foot pain.    Diagnoses and all orders for this visit:    PTTD (posterior tibial tendon dysfunction)  -     MRI Foot (Hindfoot) Left W W/O Contrast; Future    Pes planus, unspecified laterality    Sinus tarsitis,  unspecified laterality  -     MRI Foot (Hindfoot) Left W W/O Contrast; Future        I counseled the patient on her conditions, their implications and medical management.  Previous xray reviewed with pes planus deformity. MRI ordered to rule out possible tear of posterior tibial tendon and for hindfoot arthritic changes.   Recommend continue boot for LLE, she is to wear at all times while ambulating for 6 weeks, she is to wear with supportive tennis shoe to opposite right extremity  Recommend RICE  Referral PT placed, stretching and strengthening exercises previously dispensed  Return to clinic to review imaging results

## 2024-04-18 ENCOUNTER — HOSPITAL ENCOUNTER (OUTPATIENT)
Dept: RADIOLOGY | Facility: HOSPITAL | Age: 59
Discharge: HOME | End: 2024-04-18
Attending: STUDENT IN AN ORGANIZED HEALTH CARE EDUCATION/TRAINING PROGRAM
Payer: COMMERCIAL

## 2024-04-18 DIAGNOSIS — M25.579 SINUS TARSITIS, UNSPECIFIED LATERALITY: ICD-10-CM

## 2024-04-18 DIAGNOSIS — M76.829 PTTD (POSTERIOR TIBIAL TENDON DYSFUNCTION): ICD-10-CM

## 2024-04-18 PROCEDURE — 73720 MRI LWR EXTREMITY W/O&W/DYE: CPT | Mod: 26,LT,, | Performed by: RADIOLOGY

## 2024-04-18 PROCEDURE — A9585 GADOBUTROL INJECTION: HCPCS | Performed by: STUDENT IN AN ORGANIZED HEALTH CARE EDUCATION/TRAINING PROGRAM

## 2024-04-18 PROCEDURE — 73720 MRI LWR EXTREMITY W/O&W/DYE: CPT | Mod: TC,LT

## 2024-04-18 PROCEDURE — 25500020 PHARM REV CODE 255: Performed by: STUDENT IN AN ORGANIZED HEALTH CARE EDUCATION/TRAINING PROGRAM

## 2024-04-18 RX ORDER — GADOBUTROL 604.72 MG/ML
10 INJECTION INTRAVENOUS
Status: COMPLETED | OUTPATIENT
Start: 2024-04-18 | End: 2024-04-18

## 2024-04-18 RX ADMIN — GADOBUTROL 10 ML: 604.72 INJECTION INTRAVENOUS at 03:04

## 2024-04-19 ENCOUNTER — TELEPHONE (OUTPATIENT)
Dept: FAMILY MEDICINE | Facility: CLINIC | Age: 59
End: 2024-04-19
Payer: COMMERCIAL

## 2024-04-19 DIAGNOSIS — E87.6 HYPOKALEMIA: Primary | ICD-10-CM

## 2024-04-19 RX ORDER — POTASSIUM CHLORIDE 20 MEQ/1
20 TABLET, EXTENDED RELEASE ORAL DAILY
Qty: 90 TABLET | Refills: 2 | Status: SHIPPED | OUTPATIENT
Start: 2024-04-19

## 2024-04-24 ENCOUNTER — OFFICE VISIT (OUTPATIENT)
Dept: OBSTETRICS AND GYNECOLOGY | Facility: CLINIC | Age: 59
End: 2024-04-24
Payer: COMMERCIAL

## 2024-04-24 VITALS
DIASTOLIC BLOOD PRESSURE: 81 MMHG | BODY MASS INDEX: 33.73 KG/M2 | SYSTOLIC BLOOD PRESSURE: 131 MMHG | WEIGHT: 241.88 LBS

## 2024-04-24 DIAGNOSIS — R87.611 ATYPICAL SQUAMOUS CELLS CANNOT EXCLUDE HIGH GRADE SQUAMOUS INTRAEPITHELIAL LESION ON CYTOLOGIC SMEAR OF CERVIX (ASC-H): Primary | ICD-10-CM

## 2024-04-24 PROCEDURE — 99999 PR PBB SHADOW E&M-EST. PATIENT-LVL III: CPT | Mod: PBBFAC,,, | Performed by: STUDENT IN AN ORGANIZED HEALTH CARE EDUCATION/TRAINING PROGRAM

## 2024-04-24 PROCEDURE — 99499 UNLISTED E&M SERVICE: CPT | Mod: S$GLB,,, | Performed by: STUDENT IN AN ORGANIZED HEALTH CARE EDUCATION/TRAINING PROGRAM

## 2024-04-24 PROCEDURE — 88305 TISSUE EXAM BY PATHOLOGIST: CPT | Mod: 26,,, | Performed by: PATHOLOGY

## 2024-04-24 PROCEDURE — 88305 TISSUE EXAM BY PATHOLOGIST: CPT | Performed by: PATHOLOGY

## 2024-04-24 PROCEDURE — 57454 BX/CURETT OF CERVIX W/SCOPE: CPT | Mod: S$GLB,,, | Performed by: STUDENT IN AN ORGANIZED HEALTH CARE EDUCATION/TRAINING PROGRAM

## 2024-04-24 NOTE — PROCEDURES
Colposcopy    Date/Time: 4/24/2024 9:45 AM    Performed by: Elana Silvestre MD  Authorized by: Elana Silvestre MD    Consent obatined:  Prior to procedure the appropriate consent was completed and verified  Timeout:Immediately prior to procedure a time out was called to verify the correct patient, procedure, equipment, support staff and site/side marked as required    Colposcopy Site:  Cervix  Position:  Supine  Acrowhite Lesion? Yes    Atypical Vessels: No    Transformation Zone Adequate?: No    Biopsy?: Yes         Location:  Cervix ((5 00))  ECC Performed?: Yes    LEEP Performed?: No    Estimated blood loss (cc):  0   Patient tolerated the procedure well with no immediate complications.   Post-operative instructions were provided for the patient.   Patient was discharged and will follow up if any complications occur

## 2024-04-24 NOTE — PROGRESS NOTES
CC: abnormal pap, here for colposcopy     HPI:  Lori Burgess is a 58 y.o. female   presents for colposcopy.  No LMP recorded (lmp unknown). Patient has had a hysterectomy..  Her most recent pap smear shows ASC cannot exclude high grade lesion (ASCH).      ROS:  GENERAL: Denies weight gain or weight loss. Feeling well overall.   SKIN: Denies rash or lesions.   HEAD: Denies head injury or headache.   NODES: Denies enlarged lymph nodes.   CHEST: Denies chest pain or shortness of breath.   CARDIOVASCULAR: Denies palpitations or left sided chest pain.   ABDOMEN: No abdominal pain, constipation, diarrhea, nausea, vomiting or rectal bleeding.   URINARY: No frequency, dysuria, hematuria, or burning on urination.  REPRODUCTIVE: See HPI.   BREASTS: The patient performs breast self-examination and denies pain, lumps, or nipple discharge.   HEMATOLOGIC: No easy bruisability or excessive bleeding.  MUSCULOSKELETAL: Denies joint pain or swelling.   NEUROLOGIC: Denies syncope or weakness.   PSYCHIATRIC: Denies depression, anxiety or mood swings.    Objective:   /81   Wt 109.7 kg (241 lb 13.5 oz)   LMP  (LMP Unknown)   BMI 33.73 kg/m²       Physical Exam:  APPEARANCE: Well nourished, well developed, in no acute distress.  AFFECT: WNL, alert and oriented x 3  PELVIC: Normal external genitalia without lesions.  Normal hair distribution.  Adequate perineal body, normal urethral meatus.  Vagina without lesions or discharge.  Cervix pink, without discharge or tenderness. Colpo with biopsy, ECC completed today      Assessment and Plan:    Abnormal pap smear  - The abnormal test findings and HPV infection were discussed. Patient counseled on the need for colposcopy and possible biopsies/ECC to determine further indicated treatments. Discussed minimal risk of bleeding and infection with colposcopy, and alternatives to colposcopy and patient agreed to proceed.    - Post-colposcopy counseling completed including:  manage post-colposcopy cramping with NSAIDs or Tylenol.   Expect a clumpy blackish discharge due to Monsel's solution application for several days.  Report heavy bleeding, worsening pain or pain that does not respond to above medications, or foul-smelling vaginal discharge.   - Follow up pending colposcopy results.      Stephanie Heaney, MD OBGYN [facility]

## 2024-04-29 LAB
FINAL PATHOLOGIC DIAGNOSIS: NORMAL
GROSS: NORMAL
Lab: NORMAL

## 2024-05-02 ENCOUNTER — OFFICE VISIT (OUTPATIENT)
Dept: PODIATRY | Facility: CLINIC | Age: 59
End: 2024-05-02
Payer: COMMERCIAL

## 2024-05-02 VITALS
SYSTOLIC BLOOD PRESSURE: 124 MMHG | BODY MASS INDEX: 33.86 KG/M2 | DIASTOLIC BLOOD PRESSURE: 81 MMHG | WEIGHT: 241.88 LBS | HEIGHT: 71 IN | HEART RATE: 60 BPM

## 2024-05-02 DIAGNOSIS — S93.429S: ICD-10-CM

## 2024-05-02 DIAGNOSIS — M25.579 SINUS TARSITIS, UNSPECIFIED LATERALITY: ICD-10-CM

## 2024-05-02 DIAGNOSIS — M76.829 PTTD (POSTERIOR TIBIAL TENDON DYSFUNCTION): Primary | ICD-10-CM

## 2024-05-02 DIAGNOSIS — M21.40 PES PLANUS, UNSPECIFIED LATERALITY: ICD-10-CM

## 2024-05-02 PROCEDURE — 99214 OFFICE O/P EST MOD 30 MIN: CPT | Mod: S$GLB,,, | Performed by: STUDENT IN AN ORGANIZED HEALTH CARE EDUCATION/TRAINING PROGRAM

## 2024-05-02 PROCEDURE — 3044F HG A1C LEVEL LT 7.0%: CPT | Mod: CPTII,S$GLB,, | Performed by: STUDENT IN AN ORGANIZED HEALTH CARE EDUCATION/TRAINING PROGRAM

## 2024-05-02 PROCEDURE — 3074F SYST BP LT 130 MM HG: CPT | Mod: CPTII,S$GLB,, | Performed by: STUDENT IN AN ORGANIZED HEALTH CARE EDUCATION/TRAINING PROGRAM

## 2024-05-02 PROCEDURE — 99999 PR PBB SHADOW E&M-EST. PATIENT-LVL III: CPT | Mod: PBBFAC,,, | Performed by: STUDENT IN AN ORGANIZED HEALTH CARE EDUCATION/TRAINING PROGRAM

## 2024-05-02 PROCEDURE — 1159F MED LIST DOCD IN RCRD: CPT | Mod: CPTII,S$GLB,, | Performed by: STUDENT IN AN ORGANIZED HEALTH CARE EDUCATION/TRAINING PROGRAM

## 2024-05-02 PROCEDURE — 3008F BODY MASS INDEX DOCD: CPT | Mod: CPTII,S$GLB,, | Performed by: STUDENT IN AN ORGANIZED HEALTH CARE EDUCATION/TRAINING PROGRAM

## 2024-05-02 PROCEDURE — 3079F DIAST BP 80-89 MM HG: CPT | Mod: CPTII,S$GLB,, | Performed by: STUDENT IN AN ORGANIZED HEALTH CARE EDUCATION/TRAINING PROGRAM

## 2024-05-02 NOTE — PROGRESS NOTES
Subjective:     Patient ID: Lori Burgess is a 58 y.o. female.    Chief Complaint: Follow-up    Lori is a 58 y.o. female who presents to the podiatry clinic  with complaint of  bilateral foot pain. Relates to pain to left foot, points to inside of ankle of LLE, and to back of foot, points to back of ankle of RLE. Onset of the symptoms was several months ago. Precipitating event: none known. Current symptoms include: worsening symptoms after a period of activity. Aggravating factors: any weight bearing. Symptoms have gradually worsened. Patient has had no prior foot problems. Evaluation to date: none. Treatment to date: none. Patients rates pain 10/10 on pain scale. Relates left side is much worse.     4/17/24: Seen today, nicki has been in boot for 2 weeks. Relates boot helps when she puts it on but when she is on her feet for a long time, pain comes back and is very severe. No further pedal complaints. Right foot is not bothering her today.    5/2/24: Seen today, nicki not in work currently as they do not allow a boot. She relates boot is helping but she has severe pain when she removes the boot. She states she has had pain for many years but it has recently become intolerable. Here to review MRI.    Review of Systems   Constitutional: Negative for chills, decreased appetite, diaphoresis and fever.   HENT:  Negative for congestion and hearing loss.    Cardiovascular:  Negative for chest pain, claudication, leg swelling and syncope.   Respiratory:  Negative for cough and shortness of breath.    Skin:  Negative for color change, dry skin, flushing, itching, nail changes, poor wound healing and rash.   Musculoskeletal:  Positive for arthritis, back pain and joint pain. Negative for joint swelling.   Gastrointestinal:  Negative for nausea and vomiting.   Neurological:  Positive for numbness and paresthesias. Negative for focal weakness and weakness.   Psychiatric/Behavioral:  Negative for altered mental  status. The patient is not nervous/anxious.         Objective:     Physical Exam  Constitutional:       General: She is not in acute distress.     Appearance: She is well-developed. She is not diaphoretic.   Cardiovascular:      Comments: Dorsalis pedis and posterior tibial pulses are within normal limits. Skin temperature is within normal limits. Toes are cool to touch and feet are warm proximally. Hair growth is within normal limits. Skin is normotrophic and without hyperpigmentation. No edema noted. No spider veins or varicosities noted, bilaterally.   Musculoskeletal:         General: Tenderness present.      Comments: Adequate joint range of motion without pain, limitation, nor crepitation to bilateral feet and ankle joints. Muscle strength is 5/5 in all groups bilaterally.    Tenderness to left foot along course of posterior tibial tendon just posterior and distal to medial malleolus. Tenderness to sinus tarsi and anteriolateral gutter of ankle joint.     Pes planus bilaterally    Lymphadenopathy:      Comments: Negative lymphangitic streaking    Skin:     General: Skin is warm and dry.      Findings: No lesion.      Comments: Skin is warm and dry, no acute signs of infection noted. No open wounds, macerations or hyperkeratotic lesions, bilaterally.     Toenails are well trimmed and of normal morphology, bilaterally.    Neurological:      Mental Status: She is alert and oriented to person, place, and time.      Sensory: No sensory deficit.      Motor: No abnormal muscle tone.      Comments: Light touch within normal limits.    Psychiatric:         Behavior: Behavior normal.         Thought Content: Thought content normal.         Judgment: Judgment normal.       Left Hindfoot MRI:  Impression:     1. Tibialis posterior tenosynovitis.  2. Osteochondral injury of the lateral talar dome.  3. Chronic sprain of the deltoid ligament.  4. Nonspecific edema of extensor digitorum brevis musculature.  5. Edema within  the sinus tarsi.  6. Mild Achilles tendinosis.  7. Additional findings above.    Assessment:      Encounter Diagnoses   Name Primary?    PTTD (posterior tibial tendon dysfunction) Yes    Pes planus, unspecified laterality     Sinus tarsitis, unspecified laterality     Sprain of deltoid ligament of ankle, unspecified laterality, sequela          Plan:     Lori was seen today for follow-up.    Diagnoses and all orders for this visit:    PTTD (posterior tibial tendon dysfunction)  -     Ambulatory referral/consult to Physical/Occupational Therapy; Future    Pes planus, unspecified laterality  -     Ambulatory referral/consult to Physical/Occupational Therapy; Future    Sinus tarsitis, unspecified laterality  -     Ambulatory referral/consult to Physical/Occupational Therapy; Future    Sprain of deltoid ligament of ankle, unspecified laterality, sequela          I counseled the patient on her conditions, their implications and medical management.  MRI reviewed with patient. Discussed surgical vs conservative treatment options for chronic foot pain and foot deformity. She relates wants to avoid surgery at this time. Will consider custom AFO if needed in the future, she declines AFO today  Recommend continue boot for LLE, she is to wear at all times while ambulating for 4-6 weeks, she is to wear with supportive tennis shoe to opposite right extremity  RICE, she takes tylenol for pain  Referral PT placed, stretching and strengthening exercises previously dispensed  Return to clinic in 6 weeks, sooner PRN

## 2024-05-13 PROBLEM — R26.9 GAIT ABNORMALITY: Status: ACTIVE | Noted: 2024-05-13

## 2024-05-13 PROBLEM — R53.1 WEAKNESS: Status: ACTIVE | Noted: 2024-05-13

## 2024-05-13 PROBLEM — R68.89 DECREASED FUNCTIONAL ACTIVITY TOLERANCE: Status: ACTIVE | Noted: 2024-05-13

## 2024-05-13 PROBLEM — M25.673 IMPAIRED RANGE OF MOTION OF ANKLE: Status: ACTIVE | Noted: 2024-05-13

## 2024-06-13 ENCOUNTER — OFFICE VISIT (OUTPATIENT)
Dept: PODIATRY | Facility: CLINIC | Age: 59
End: 2024-06-13
Payer: COMMERCIAL

## 2024-06-13 VITALS
DIASTOLIC BLOOD PRESSURE: 80 MMHG | BODY MASS INDEX: 33.73 KG/M2 | SYSTOLIC BLOOD PRESSURE: 134 MMHG | HEIGHT: 71 IN | HEART RATE: 58 BPM

## 2024-06-13 DIAGNOSIS — M76.829 PTTD (POSTERIOR TIBIAL TENDON DYSFUNCTION): Primary | ICD-10-CM

## 2024-06-13 DIAGNOSIS — M21.40 PES PLANUS, UNSPECIFIED LATERALITY: ICD-10-CM

## 2024-06-13 PROCEDURE — 1159F MED LIST DOCD IN RCRD: CPT | Mod: CPTII,S$GLB,, | Performed by: STUDENT IN AN ORGANIZED HEALTH CARE EDUCATION/TRAINING PROGRAM

## 2024-06-13 PROCEDURE — 3044F HG A1C LEVEL LT 7.0%: CPT | Mod: CPTII,S$GLB,, | Performed by: STUDENT IN AN ORGANIZED HEALTH CARE EDUCATION/TRAINING PROGRAM

## 2024-06-13 PROCEDURE — 3008F BODY MASS INDEX DOCD: CPT | Mod: CPTII,S$GLB,, | Performed by: STUDENT IN AN ORGANIZED HEALTH CARE EDUCATION/TRAINING PROGRAM

## 2024-06-13 PROCEDURE — 3079F DIAST BP 80-89 MM HG: CPT | Mod: CPTII,S$GLB,, | Performed by: STUDENT IN AN ORGANIZED HEALTH CARE EDUCATION/TRAINING PROGRAM

## 2024-06-13 PROCEDURE — 99999 PR PBB SHADOW E&M-EST. PATIENT-LVL III: CPT | Mod: PBBFAC,,, | Performed by: STUDENT IN AN ORGANIZED HEALTH CARE EDUCATION/TRAINING PROGRAM

## 2024-06-13 PROCEDURE — 3075F SYST BP GE 130 - 139MM HG: CPT | Mod: CPTII,S$GLB,, | Performed by: STUDENT IN AN ORGANIZED HEALTH CARE EDUCATION/TRAINING PROGRAM

## 2024-06-13 PROCEDURE — 99214 OFFICE O/P EST MOD 30 MIN: CPT | Mod: S$GLB,,, | Performed by: STUDENT IN AN ORGANIZED HEALTH CARE EDUCATION/TRAINING PROGRAM

## 2024-06-13 NOTE — LETTER
June 13, 2024      [facility]  [address]  [address]  [place] [address]  Phone: [phone]  Fax: [phone]       Patient: Lori Burgess   YOB: 1965  Date of Visit: 06/13/2024    To Whom It May Concern:    Rodrigo Burgess  was at [facility] on 06/13/2024. The patient may return to work/school on 6/17/24 with no restrictions. If you have any questions or concerns, or if I can be of further assistance, please do not hesitate to contact me.    Sincerely,    [place] [employer]

## 2024-06-13 NOTE — PROGRESS NOTES
Subjective:     Patient ID: Lori Burgess is a 58 y.o. female.    Chief Complaint: Foot Problem (A lot better, ) and Follow-up    Lori is a 58 y.o. female who presents to the podiatry clinic  with complaint of  bilateral foot pain. Relates to pain to left foot, points to inside of ankle of LLE, and to back of foot, points to back of ankle of RLE. Onset of the symptoms was several months ago. Precipitating event: none known. Current symptoms include: worsening symptoms after a period of activity. Aggravating factors: any weight bearing. Symptoms have gradually worsened. Patient has had no prior foot problems. Evaluation to date: none. Treatment to date: none. Patients rates pain 10/10 on pain scale. Relates left side is much worse.     4/17/24: Seen today, nicki has been in boot for 2 weeks. Relates boot helps when she puts it on but when she is on her feet for a long time, pain comes back and is very severe. No further pedal complaints. Right foot is not bothering her today.    5/2/24: Seen today, nicki not in work currently as they do not allow a boot. She relates boot is helping but she has severe pain when she removes the boot. She states she has had pain for many years but it has recently become intolerable. Here to review MRI.    6/13/24: Seen today, relates ankle pain is gone, continues with PT. No further pedal complaints.     Review of Systems   Constitutional: Negative for chills, decreased appetite, diaphoresis and fever.   HENT:  Negative for congestion and hearing loss.    Cardiovascular:  Negative for chest pain, claudication, leg swelling and syncope.   Respiratory:  Negative for cough and shortness of breath.    Skin:  Negative for color change, dry skin, flushing, itching, nail changes, poor wound healing and rash.   Musculoskeletal:  Positive for arthritis, back pain and joint pain. Negative for joint swelling.   Gastrointestinal:  Negative for nausea and vomiting.   Neurological:   Positive for numbness and paresthesias. Negative for focal weakness and weakness.   Psychiatric/Behavioral:  Negative for altered mental status. The patient is not nervous/anxious.         Objective:     Physical Exam  Constitutional:       General: She is not in acute distress.     Appearance: She is well-developed. She is not diaphoretic.   Eyes:      General: No scleral icterus.  Cardiovascular:      Comments: Dorsalis pedis and posterior tibial pulses are within normal limits. Skin temperature is within normal limits. Toes are cool to touch and feet are warm proximally. Hair growth is within normal limits. Skin is normotrophic and without hyperpigmentation. No edema noted. No spider veins or varicosities noted, bilaterally.   Musculoskeletal:      Comments: Adequate joint range of motion without pain, limitation, nor crepitation to bilateral feet and ankle joints. Muscle strength is 5/5 in all groups bilaterally.    Resolved tenderness to left foot along course of posterior tibial tendon just posterior and distal to medial malleolus, mild localized swelling noted. No further tenderness to sinus tarsi and anteriolateral gutter of ankle joint.     Pes planus bilaterally, pronation of hindfoot on weightbearing   Lymphadenopathy:      Comments: Negative lymphangitic streaking    Skin:     General: Skin is warm and dry.      Findings: No lesion.      Comments: Skin is warm and dry, no acute signs of infection noted. No open wounds, macerations or hyperkeratotic lesions, bilaterally.     Toenails are well trimmed and of normal morphology, bilaterally.    Neurological:      Mental Status: She is alert and oriented to person, place, and time.      Sensory: No sensory deficit.      Motor: No abnormal muscle tone.      Comments: Light touch within normal limits.    Psychiatric:         Behavior: Behavior normal.         Thought Content: Thought content normal.         Judgment: Judgment normal.       Left Hindfoot  MRI:  Impression:     1. Tibialis posterior tenosynovitis.  2. Osteochondral injury of the lateral talar dome.  3. Chronic sprain of the deltoid ligament.  4. Nonspecific edema of extensor digitorum brevis musculature.  5. Edema within the sinus tarsi.  6. Mild Achilles tendinosis.  7. Additional findings above.    Assessment:      Encounter Diagnoses   Name Primary?    PTTD (posterior tibial tendon dysfunction) Yes    Pes planus, unspecified laterality            Plan:     Lori was seen today for foot problem and follow-up.    Diagnoses and all orders for this visit:    PTTD (posterior tibial tendon dysfunction)    Pes planus, unspecified laterality            I counseled the patient on her conditions, their implications and medical management.  Ok to slowly transition out of boot to supportive tennis shoes and ankle brace. Recommend OTC arch supports. Will consider custom AFO if needed in the future, she declines AFO today  RICE, she takes tylenol for pain  Continue PT  Letter to return to work  Return to clinic PRN

## 2024-07-03 ENCOUNTER — PATIENT OUTREACH (OUTPATIENT)
Dept: ADMINISTRATIVE | Facility: OTHER | Age: 59
End: 2024-07-03

## 2024-07-07 ENCOUNTER — HOSPITAL ENCOUNTER (EMERGENCY)
Facility: HOSPITAL | Age: 59
Discharge: LEFT BEFORE DISCHARGE | End: 2024-07-07
Payer: COMMERCIAL

## 2024-07-07 VITALS
SYSTOLIC BLOOD PRESSURE: 162 MMHG | HEART RATE: 67 BPM | OXYGEN SATURATION: 98 % | TEMPERATURE: 97 F | DIASTOLIC BLOOD PRESSURE: 79 MMHG | BODY MASS INDEX: 30.8 KG/M2 | WEIGHT: 220 LBS | RESPIRATION RATE: 18 BRPM | HEIGHT: 71 IN

## 2024-07-07 PROCEDURE — 99283 EMERGENCY DEPT VISIT LOW MDM: CPT | Mod: 25

## 2024-07-07 RX ORDER — METHOCARBAMOL 750 MG/1
750 TABLET, FILM COATED ORAL
Status: DISCONTINUED | OUTPATIENT
Start: 2024-07-07 | End: 2024-07-08 | Stop reason: HOSPADM

## 2024-07-07 RX ORDER — TRAMADOL HYDROCHLORIDE 50 MG/1
50 TABLET ORAL
Status: DISCONTINUED | OUTPATIENT
Start: 2024-07-07 | End: 2024-07-07

## 2024-07-07 RX ORDER — LIDOCAINE 50 MG/G
1 PATCH TOPICAL
Status: DISCONTINUED | OUTPATIENT
Start: 2024-07-07 | End: 2024-07-08 | Stop reason: HOSPADM

## 2024-07-08 ENCOUNTER — PATIENT MESSAGE (OUTPATIENT)
Dept: FAMILY MEDICINE | Facility: CLINIC | Age: 59
End: 2024-07-08

## 2024-07-08 NOTE — FIRST PROVIDER EVALUATION
"Medical screening examination initiated.  I have conducted a focused provider triage encounter, findings are as follows:    Brief history of present illness:  left lower back pain with radiation into the buttock. Pt has PMH of chronic back pain. No new injury reported.     Vitals:    07/07/24 1751   BP: (!) 162/79   BP Location: Left arm   Patient Position: Sitting   Pulse: 67   Resp: 18   Temp: 97.4 °F (36.3 °C)   TempSrc: Oral   SpO2: 98%   Weight: 99.8 kg (220 lb)   Height: 5' 11" (1.803 m)       Pertinent physical exam:  Left lower back pain. Normal gait.     Brief workup plan:  Xray, meds    Preliminary workup initiated; this workup will be continued and followed by the physician or advanced practice provider that is assigned to the patient when roomed.  "

## 2024-07-16 ENCOUNTER — OFFICE VISIT (OUTPATIENT)
Dept: FAMILY MEDICINE | Facility: CLINIC | Age: 59
End: 2024-07-16
Payer: COMMERCIAL

## 2024-07-16 VITALS
WEIGHT: 252 LBS | BODY MASS INDEX: 35.14 KG/M2 | DIASTOLIC BLOOD PRESSURE: 72 MMHG | HEART RATE: 75 BPM | OXYGEN SATURATION: 99 % | SYSTOLIC BLOOD PRESSURE: 118 MMHG

## 2024-07-16 DIAGNOSIS — M54.42 ACUTE LEFT-SIDED LOW BACK PAIN WITH LEFT-SIDED SCIATICA: Primary | ICD-10-CM

## 2024-07-16 DIAGNOSIS — M62.838 MUSCLE SPASM: ICD-10-CM

## 2024-07-16 PROCEDURE — 3078F DIAST BP <80 MM HG: CPT | Mod: CPTII,S$GLB,, | Performed by: FAMILY MEDICINE

## 2024-07-16 PROCEDURE — 3044F HG A1C LEVEL LT 7.0%: CPT | Mod: CPTII,S$GLB,, | Performed by: FAMILY MEDICINE

## 2024-07-16 PROCEDURE — 3008F BODY MASS INDEX DOCD: CPT | Mod: CPTII,S$GLB,, | Performed by: FAMILY MEDICINE

## 2024-07-16 PROCEDURE — 99999 PR PBB SHADOW E&M-EST. PATIENT-LVL IV: CPT | Mod: PBBFAC,,, | Performed by: FAMILY MEDICINE

## 2024-07-16 PROCEDURE — 99214 OFFICE O/P EST MOD 30 MIN: CPT | Mod: S$GLB,,, | Performed by: FAMILY MEDICINE

## 2024-07-16 PROCEDURE — 1159F MED LIST DOCD IN RCRD: CPT | Mod: CPTII,S$GLB,, | Performed by: FAMILY MEDICINE

## 2024-07-16 PROCEDURE — 3074F SYST BP LT 130 MM HG: CPT | Mod: CPTII,S$GLB,, | Performed by: FAMILY MEDICINE

## 2024-07-16 RX ORDER — LIDOCAINE 50 MG/G
1 PATCH TOPICAL DAILY
Qty: 30 PATCH | Refills: 0 | Status: SHIPPED | OUTPATIENT
Start: 2024-07-16

## 2024-07-16 RX ORDER — IBUPROFEN 800 MG/1
800 TABLET ORAL 3 TIMES DAILY
Qty: 30 TABLET | Refills: 1 | Status: SHIPPED | OUTPATIENT
Start: 2024-07-16

## 2024-07-16 RX ORDER — METHOCARBAMOL 500 MG/1
500 TABLET, FILM COATED ORAL NIGHTLY
Qty: 20 TABLET | Refills: 0 | Status: SHIPPED | OUTPATIENT
Start: 2024-07-16 | End: 2024-08-05

## 2024-07-16 NOTE — PROGRESS NOTES
(Portions of this note were dictated using voice recognition software and may contain dictation related errors in spelling/grammar/syntax not found on text review)    CC:   Chief Complaint   Patient presents with    Follow-up       HPI: 58 y.o. female with medical history significant for anemia, hypertension, hyperlipidemia, stroke presented today for evaluation of back pain.    Patient reports that symptoms started a week ago, stated that she was sitting on a couch when all of a sudden she started to have pain in the lower back, initially it was located more centrally and then starts to radiates towards the left side of the buttock and towards the legs up till below-the-knee.     She was seen in ED on 7/7,x ray of lumbar spine was done which remote compression fracture of the L3 vertebra with mild superior endplate depression and wedging appearing stable.  There is spondylosis noted with disc narrowing appearing mild and most pronounced at the lowest 3 lumbar levels.  Facet hypertrophic changes are noted and most pronounced at L5-S1.  SI joints appear symmetric and grossly intact.  There is round calcification projecting over the lower pelvis over the sacrum on the right which appear stable and possibly represents a phlebolith. Stable lumbar spine series as compared to the prior exam 06/30/2020.     She could not wait until seen by doctor and left ED and was seen in urgent care, was given muscle relaxant and ibuprofen, also took meloxicam. She reports back pain has improved but continues to have buttock pain on left which radiation towards thigh and leg.  She was wearing walking boot on left foot for past several months due to sprain of ankle on left side.    She continues to have buttock and leg pain on left side, worsened with prolonged sitting and eases up with movement, no associated numbness/tingling, bowel/bladder has been normal.    No other concerns.      Past Medical History:   Diagnosis Date    Anemia      Diverticulosis     History of gastroesophageal reflux (GERD)     Hyperlipidemia     Hypertension     Rash/skin eruption 09/02/2020    Stroke 2015    Trauma 1998    Fractured Right ankle       Past Surgical History:   Procedure Laterality Date    BARTHOLIN GLAND CYST EXCISION      Marsupialization, not excision    COLONOSCOPY      COLONOSCOPY N/A 4/17/2023    Procedure: COLONOSCOPY;  Surgeon: Sam Ospina MD;  Location: [facility] END[facility];  Service: Endoscopy;  Laterality: N/A;    HYSTERECTOMY  10/2006    Laparoscopic Supracervical Hysterectomy    TUBAL LIGATION         Family History   Problem Relation Name Age of Onset    Colon cancer Mother  55    Hypertension Mother      Diabetes Father      Hypertension Father      Hypertension Brother      Hypertension Sister      Heart disease Neg Hx      Heart attack Neg Hx      Breast cancer Neg Hx      Ovarian cancer Neg Hx         Social History     Tobacco Use    Smoking status: Never    Smokeless tobacco: Never   Substance Use Topics    Alcohol use: Not Currently    Drug use: No       Lab Results   Component Value Date    WBC 4.82 04/16/2024    HGB 12.2 04/16/2024    HCT 39.1 04/16/2024    MCV 79 (L) 04/16/2024     04/16/2024    CHOL 131 04/16/2024    TRIG 47 04/16/2024    HDL 49 04/16/2024    ALT 17 04/16/2024    AST 14 04/16/2024    BILITOT 0.8 04/16/2024    ALKPHOS 85 04/16/2024     04/16/2024    K 4.1 04/16/2024     04/16/2024    CREATININE 0.9 04/16/2024    ESTGFRAFRICA >60.0 03/04/2022    EGFRNONAA >60.0 03/04/2022    CALCIUM 9.5 04/16/2024    ALBUMIN 3.5 04/16/2024    BUN 14 04/16/2024    CO2 26 04/16/2024    TSH 0.894 04/16/2024    INR 1.0 08/09/2015    HGBA1C 5.7 (H) 04/16/2024    LDLCALC 72.6 04/16/2024    GLU 99 04/16/2024    [id number] 19 (L) 04/25/2017             Vital signs reviewed  PE:   APPEARANCE: Well nourished, well developed, in no acute distress.    HEAD: Normocephalic, atraumatic.  EYES: EOMI.  Conjunctivae noninjected.  NOSE:  Mucosa pink. Airway clear.  MOUTH & THROAT: No tonsillar enlargement. No pharyngeal erythema or exudate.   NECK: Supple with no cervical lymphadenopathy.    CHEST: Good inspiratory effort. Lungs clear to auscultation with no wheezes or crackles.  CARDIOVASCULAR: Normal S1, S2. No rubs, murmurs, or gallops.  ABDOMEN: Bowel sounds normal. Not distended. Soft. No tenderness or masses. No organomegaly.  EXTREMITIES: No edema, cyanosis, or clubbing.    BACK: TTP left buttock, SLR test positive, ROM restricted      Review of Systems   Constitutional:  Negative for chills, fatigue and fever.   HENT: Negative.     Respiratory:  Negative for cough, shortness of breath and wheezing.    Cardiovascular:  Negative for chest pain, palpitations and leg swelling.   Gastrointestinal: Negative.    Genitourinary: Negative.    Musculoskeletal:  Positive for back pain.   Neurological: Negative.    Psychiatric/Behavioral: Negative.     All other systems reviewed and are negative.      IMPRESSION  1. Acute left-sided low back pain with left-sided sciatica    2. Muscle spasm            PLAN      1. Acute left-sided low back pain with left-sided sciatica    - methocarbamoL (ROBAXIN) 500 MG Tab; Take 1 tablet (500 mg total) by mouth every evening. for 20 days  Dispense: 20 tablet; Refill: 0    - Ambulatory referral/consult to Physical/Occupational Therapy; Future    - ibuprofen (ADVIL,MOTRIN) 800 MG tablet; Take 1 tablet (800 mg total) by mouth 3 (three) times daily.  Dispense: 30 tablet; Refill: 1    - LIDOcaine (LIDODERM) 5 %; Place 1 patch onto the skin once daily. Remove & Discard patch within 12 hours or as directed by MD  Dispense: 30 patch; Refill: 0      Consider referral to orthopedics/Neurosurgery if symptoms are persistent      2. Muscle spasm     - methocarbamoL (ROBAXIN) 500 MG Tab; Take 1 tablet (500 mg total) by mouth every evening. for 20 days  Dispense: 20 tablet; Refill: 0      - LIDOcaine (LIDODERM) 5 %; Place 1 patch onto  the skin once daily. Remove & Discard patch within 12 hours or as directed by MD  Dispense: 30 patch; Refill: 0        SCREENINGS        Age/demographic appropriate health maintenance:    Health Maintenance Due   Topic Date Due    Mammogram  08/01/2024         Return ED/urgent care precautions discussed      Spent adequate time in obtaining history and explaining differentials     35 minutes spent during this visit of which greater than 50% devoted to face-face counseling and coordination of care regarding diagnosis and management plan       Anant Hoff   7/16/2024

## 2024-07-17 NOTE — PROGRESS NOTES
CHW - Outreach Attempt    Community Health Worker left a In Basket message for 1st attempt to contact patient regarding: SDOH  Community Health Worker to attempt to contact patient on: 7/31  CHW - Outreach Attempt    Community Health Worker left a voicemail message for 2nd attempt to contact patient regarding: SDOH  Community Health Worker to attempt to contact patient on: 8/15  CHW - Case Closure    This Community Health Worker spoke to patient via telephone today.   Pt/Caregiver reported: Pt denied needing any assistance at this time. SDOH completed verified updated by CHW  Pt/Caregiver denied any additional needs at this time and agrees with episode closure at this time.  Provided patient with Community Health Worker's contact information and encouraged him/her to contact this Community Health Worker if additional needs arise.

## 2024-08-04 ENCOUNTER — PATIENT MESSAGE (OUTPATIENT)
Dept: FAMILY MEDICINE | Facility: CLINIC | Age: 59
End: 2024-08-04
Payer: COMMERCIAL

## 2024-08-08 DIAGNOSIS — Z12.31 OTHER SCREENING MAMMOGRAM: ICD-10-CM

## 2024-08-09 ENCOUNTER — HOSPITAL ENCOUNTER (OUTPATIENT)
Dept: RADIOLOGY | Facility: HOSPITAL | Age: 59
Discharge: HOME | End: 2024-08-09
Attending: FAMILY MEDICINE
Payer: COMMERCIAL

## 2024-08-09 DIAGNOSIS — Z12.31 OTHER SCREENING MAMMOGRAM: ICD-10-CM

## 2024-08-09 PROCEDURE — 77067 SCR MAMMO BI INCL CAD: CPT | Mod: TC

## 2025-01-24 DIAGNOSIS — I10 ESSENTIAL HYPERTENSION: ICD-10-CM

## 2025-01-24 RX ORDER — CARVEDILOL 25 MG/1
25 TABLET ORAL 2 TIMES DAILY WITH MEALS
Qty: 180 TABLET | Refills: 0 | Status: SHIPPED | OUTPATIENT
Start: 2025-01-24

## 2025-02-04 DIAGNOSIS — E87.6 HYPOKALEMIA: ICD-10-CM

## 2025-02-04 RX ORDER — POTASSIUM CHLORIDE 20 MEQ/1
20 TABLET, EXTENDED RELEASE ORAL DAILY
Qty: 90 TABLET | Refills: 2 | Status: SHIPPED | OUTPATIENT
Start: 2025-02-04

## 2025-04-30 DIAGNOSIS — R73.03 PREDIABETES: ICD-10-CM

## 2025-05-04 DIAGNOSIS — I10 ESSENTIAL HYPERTENSION: ICD-10-CM

## 2025-05-04 DIAGNOSIS — E87.6 DIURETIC-INDUCED HYPOKALEMIA: ICD-10-CM

## 2025-05-04 DIAGNOSIS — T50.2X5A DIURETIC-INDUCED HYPOKALEMIA: ICD-10-CM

## 2025-05-04 DIAGNOSIS — I10 PRIMARY HYPERTENSION: ICD-10-CM

## 2025-05-04 DIAGNOSIS — Z86.73 HISTORY OF STROKE: ICD-10-CM

## 2025-05-05 RX ORDER — SPIRONOLACTONE 50 MG/1
50 TABLET, FILM COATED ORAL 2 TIMES DAILY
Qty: 60 TABLET | Refills: 11 | Status: SHIPPED | OUTPATIENT
Start: 2025-05-05 | End: 2026-05-05

## 2025-05-05 RX ORDER — NIFEDIPINE 90 MG/1
90 TABLET, EXTENDED RELEASE ORAL DAILY
Qty: 90 TABLET | Refills: 3 | Status: SHIPPED | OUTPATIENT
Start: 2025-05-05

## 2025-05-05 RX ORDER — CARVEDILOL 25 MG/1
25 TABLET ORAL 2 TIMES DAILY WITH MEALS
Qty: 180 TABLET | Refills: 0 | Status: SHIPPED | OUTPATIENT
Start: 2025-05-05

## 2025-05-05 RX ORDER — ATORVASTATIN CALCIUM 20 MG/1
20 TABLET, FILM COATED ORAL DAILY
Qty: 90 TABLET | Refills: 3 | Status: SHIPPED | OUTPATIENT
Start: 2025-05-05

## 2025-05-09 ENCOUNTER — OFFICE VISIT (OUTPATIENT)
Dept: FAMILY MEDICINE | Facility: CLINIC | Age: 60
End: 2025-05-09
Payer: COMMERCIAL

## 2025-05-09 VITALS
WEIGHT: 251.13 LBS | HEART RATE: 62 BPM | HEIGHT: 71 IN | TEMPERATURE: 98 F | SYSTOLIC BLOOD PRESSURE: 136 MMHG | DIASTOLIC BLOOD PRESSURE: 76 MMHG | BODY MASS INDEX: 35.16 KG/M2 | OXYGEN SATURATION: 98 %

## 2025-05-09 DIAGNOSIS — E78.5 HYPERLIPIDEMIA, UNSPECIFIED HYPERLIPIDEMIA TYPE: ICD-10-CM

## 2025-05-09 DIAGNOSIS — E66.812 CLASS 2 SEVERE OBESITY DUE TO EXCESS CALORIES WITH SERIOUS COMORBIDITY AND BODY MASS INDEX (BMI) OF 35.0 TO 35.9 IN ADULT: ICD-10-CM

## 2025-05-09 DIAGNOSIS — R73.03 PRE-DIABETES: ICD-10-CM

## 2025-05-09 DIAGNOSIS — Z12.31 SCREENING MAMMOGRAM FOR BREAST CANCER: ICD-10-CM

## 2025-05-09 DIAGNOSIS — K21.9 GASTROESOPHAGEAL REFLUX DISEASE, UNSPECIFIED WHETHER ESOPHAGITIS PRESENT: ICD-10-CM

## 2025-05-09 DIAGNOSIS — E87.6 HYPOKALEMIA: ICD-10-CM

## 2025-05-09 DIAGNOSIS — Z00.00 ANNUAL PHYSICAL EXAM: Primary | ICD-10-CM

## 2025-05-09 DIAGNOSIS — E66.01 CLASS 2 SEVERE OBESITY DUE TO EXCESS CALORIES WITH SERIOUS COMORBIDITY AND BODY MASS INDEX (BMI) OF 35.0 TO 35.9 IN ADULT: ICD-10-CM

## 2025-05-09 DIAGNOSIS — I10 PRIMARY HYPERTENSION: ICD-10-CM

## 2025-05-09 PROBLEM — R53.1 WEAKNESS: Status: RESOLVED | Noted: 2024-05-13 | Resolved: 2025-05-09

## 2025-05-09 PROCEDURE — 99999 PR PBB SHADOW E&M-EST. PATIENT-LVL III: CPT | Mod: PBBFAC,,, | Performed by: FAMILY MEDICINE

## 2025-05-09 NOTE — PROGRESS NOTES
(Portions of this note were dictated using voice recognition software and may contain dictation related errors in spelling/grammar/syntax not found on text review)    CC:   Chief Complaint   Patient presents with    Annual Exam       HPI: 59 y.o. female presented for routine annual examination and labs.  She has medical history significant for prediabetes, essential hypertension, diuretic-induced hypokalemia, hyperlipidemia, history of stroke.     HTN:  Blood pressure has been stable with Procardia 90 mg, carvedilol 25 mg and Aldactone 50 mg, reports adherence with medications, no side effects     Hypokalemia:  She has diuretic-induced hypokalemia, takes potassium supplements every day    HLD:  She takes atorvastatin 20 mg daily, reports adherence, need refills.       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      She denies having any other symptoms or concerns.    Past Medical History:   Diagnosis Date    Anemia ?    Diverticulosis     History of gastroesophageal reflux (GERD)     Hyperlipidemia     Hypertension 20 years old    Rash/skin eruption 09/02/2020    Stroke 2015    Trauma 1998    Fractured Right ankle       Past Surgical History:   Procedure Laterality Date    BARTHOLIN GLAND CYST EXCISION      Marsupialization, not excision    COLONOSCOPY      COLONOSCOPY N/A 04/17/2023    Procedure: COLONOSCOPY;  Surgeon: Sam Ospina MD;  Location: [facility];  Service: Endoscopy;  Laterality: N/A;    HYSTERECTOMY  10/2006    Laparoscopic Supracervical Hysterectomy    TUBAL LIGATION  1991       Family History   Problem Relation Name Age of Onset    Colon cancer Mother Suzy Burgess 55    Hypertension Mother Suzy Burgess     Cancer Mother Suzy Burgess         Colon Cancer    Diabetes Father Dashawn Burgess Sr     Hypertension Father Dashawn Burgess Sr     Hypertension Brother      Hypertension Sister      Hypertension Sister Brianda  Raymundo     Hypertension Brother Dashawn Burgess Jr     Hypertension Sister Mauri Burgess     Heart disease Neg Hx      Heart attack Neg Hx      Breast cancer Neg Hx      Ovarian cancer Neg Hx         Social History[1]    Lab Results   Component Value Date    WBC 6.43 10/04/2024    HGB 12.2 10/04/2024    HCT 38.1 10/04/2024    MCV 78 (L) 10/04/2024     10/04/2024    CHOL 131 04/16/2024    TRIG 47 04/16/2024    HDL 49 04/16/2024    ALT 13 10/04/2024    AST 13 10/04/2024    BILITOT 0.8 10/04/2024    ALKPHOS 89 10/04/2024     10/04/2024    K 4.4 10/04/2024     10/04/2024    CREATININE 0.8 10/04/2024    ESTGFRAFRICA >60.0 03/04/2022    EGFRNONAA >60.0 03/04/2022    CALCIUM 9.7 10/04/2024    ALBUMIN 3.7 10/04/2024    BUN 11 10/04/2024    CO2 26 10/04/2024    TSH 0.894 04/16/2024    INR 1.0 08/09/2015    HGBA1C 5.7 (H) 04/16/2024    LDLCALC 72.6 04/16/2024     (H) 10/04/2024    [id number] 19 (L) 04/25/2017             Vital signs reviewed  PE:   APPEARANCE: Well nourished, well developed, in no acute distress.    HEAD: Normocephalic, atraumatic.  EYES: EOMI.  Conjunctivae noninjected.  NOSE: Mucosa pink. Airway clear.  NECK: Supple with no cervical lymphadenopathy.    CHEST: Good inspiratory effort. Lungs clear to auscultation with no wheezes or crackles.  CARDIOVASCULAR: Normal S1, S2. No rubs, murmurs, or gallops.  ABDOMEN: Bowel sounds normal. Not distended. Soft. No tenderness or masses. No organomegaly.  EXTREMITIES: No edema, cyanosis, or clubbing.    Review of Systems   Constitutional:  Negative for chills, fatigue and fever.   HENT: Negative.     Respiratory:  Negative for cough, shortness of breath and wheezing.    Cardiovascular:  Negative for chest pain, palpitations and leg swelling.   Gastrointestinal: Negative.    Genitourinary: Negative.    Neurological: Negative.    Psychiatric/Behavioral: Negative.     All other systems reviewed and are negative.      IMPRESSION  1. Annual  physical exam    2. Screening mammogram for breast cancer    3. Primary hypertension    4. Hyperlipidemia, unspecified hyperlipidemia type    5. Hypokalemia    6. Pre-diabetes    7. Gastroesophageal reflux disease, unspecified whether esophagitis present    8. Class 2 severe obesity due to excess calories with serious comorbidity and body mass index (BMI) of 35.0 to 35.9 in adult            PLAN      1. Annual physical exam (Primary)    - CBC Auto Differential; Future  - Comprehensive Metabolic Panel; Future  - TSH; Future  - Lipid Panel; Future  - Hemoglobin A1C; Future  - Iron and TIBC; Future      2. Screening mammogram for breast cancer    - Mammo Digital Diagnostic Bilat with [facility] (XPD); Future      3. Primary hypertension    Controlled    Continue current medications      4. Hyperlipidemia, unspecified hyperlipidemia type    Lipid panel ordered      5. Hypokalemia    Continue potassium supplements      6. Pre-diabetes    A1c ordered        7. Gastroesophageal reflux disease, unspecified whether esophagitis present    Avoid too much spicy and acidic food    To take pepcid/TUMs as needed      8. Class 2 severe obesity due to excess calories with serious comorbidity and body mass index (BMI) of 35.0 to 35.9 in adult    Counseling provided on healthy lifestyle, encouraged to do moderate intensity regular exercise 30 minutes every day 5 days a week, to include vegetables and fruits and cut back on saturated fats and carbohydrates.          SCREENINGS      Age/demographic appropriate health maintenance:    Health Maintenance Due   Topic Date Due    TETANUS VACCINE  Never done    Pneumococcal Vaccines (Age 50+) (1 of 2 - PCV) Never done    Shingles Vaccine (1 of 2) Never done    COVID-19 Vaccine (3 - 2024-25 season) 09/01/2024    Hemoglobin A1c (Prediabetes)  04/16/2025    Lipid Panel  04/16/2025    Mammogram  08/09/2025           Spent adequate time in obtaining history and explaining differentials     35 minutes  spent during this visit of which greater than 50% devoted to face-face counseling and coordination of care regarding diagnosis and management plan       Anant Hoff   5/9/2025         [1]   Social History  Tobacco Use    Smoking status: Never    Smokeless tobacco: Never   Substance Use Topics    Alcohol use: Not Currently    Drug use: No

## 2025-05-10 ENCOUNTER — LAB VISIT (OUTPATIENT)
Dept: LAB | Facility: HOSPITAL | Age: 60
End: 2025-05-10
Attending: FAMILY MEDICINE
Payer: COMMERCIAL

## 2025-05-10 DIAGNOSIS — Z00.00 ANNUAL PHYSICAL EXAM: ICD-10-CM

## 2025-05-10 LAB
ABSOLUTE EOSINOPHIL (OHS): 0.14 K/UL
ABSOLUTE MONOCYTE (OHS): 0.43 K/UL (ref 0.3–1)
ABSOLUTE NEUTROPHIL COUNT (OHS): 2.83 K/UL (ref 1.8–7.7)
ALBUMIN SERPL BCP-MCNC: 3.6 G/DL (ref 3.5–5.2)
ALP SERPL-CCNC: 94 UNIT/L (ref 40–150)
ALT SERPL W/O P-5'-P-CCNC: 14 UNIT/L (ref 10–44)
ANION GAP (OHS): 7 MMOL/L (ref 8–16)
AST SERPL-CCNC: 13 UNIT/L (ref 11–45)
BASOPHILS # BLD AUTO: 0.04 K/UL
BASOPHILS NFR BLD AUTO: 0.8 %
BILIRUB SERPL-MCNC: 0.9 MG/DL (ref 0.1–1)
BUN SERPL-MCNC: 14 MG/DL (ref 6–20)
CALCIUM SERPL-MCNC: 9.7 MG/DL (ref 8.7–10.5)
CHLORIDE SERPL-SCNC: 108 MMOL/L (ref 95–110)
CHOLEST SERPL-MCNC: 158 MG/DL (ref 120–199)
CHOLEST/HDLC SERPL: 3.7 {RATIO} (ref 2–5)
CO2 SERPL-SCNC: 26 MMOL/L (ref 23–29)
CREAT SERPL-MCNC: 0.9 MG/DL (ref 0.5–1.4)
EAG (OHS): 126 MG/DL (ref 68–131)
ERYTHROCYTE [DISTWIDTH] IN BLOOD BY AUTOMATED COUNT: 16.4 % (ref 11.5–14.5)
GFR SERPLBLD CREATININE-BSD FMLA CKD-EPI: >60 ML/MIN/1.73/M2
GLUCOSE SERPL-MCNC: 94 MG/DL (ref 70–110)
HBA1C MFR BLD: 6 % (ref 4–5.6)
HCT VFR BLD AUTO: 39.6 % (ref 37–48.5)
HDLC SERPL-MCNC: 43 MG/DL (ref 40–75)
HDLC SERPL: 27.2 % (ref 20–50)
HGB BLD-MCNC: 12.1 GM/DL (ref 12–16)
IMM GRANULOCYTES # BLD AUTO: 0.01 K/UL (ref 0–0.04)
IMM GRANULOCYTES NFR BLD AUTO: 0.2 % (ref 0–0.5)
IRON SATN MFR SERPL: 13 % (ref 20–50)
IRON SERPL-MCNC: 47 UG/DL (ref 30–160)
LDLC SERPL CALC-MCNC: 101.6 MG/DL (ref 63–159)
LYMPHOCYTES # BLD AUTO: 1.59 K/UL (ref 1–4.8)
MCH RBC QN AUTO: 24 PG (ref 27–31)
MCHC RBC AUTO-ENTMCNC: 30.6 G/DL (ref 32–36)
MCV RBC AUTO: 78 FL (ref 82–98)
NONHDLC SERPL-MCNC: 115 MG/DL
NUCLEATED RBC (/100WBC) (OHS): 0 /100 WBC
PLATELET # BLD AUTO: 260 K/UL (ref 150–450)
PMV BLD AUTO: 11.3 FL (ref 9.2–12.9)
POTASSIUM SERPL-SCNC: 4.2 MMOL/L (ref 3.5–5.1)
PROT SERPL-MCNC: 8.3 GM/DL (ref 6–8.4)
RBC # BLD AUTO: 5.05 M/UL (ref 4–5.4)
RELATIVE EOSINOPHIL (OHS): 2.8 %
RELATIVE LYMPHOCYTE (OHS): 31.5 % (ref 18–48)
RELATIVE MONOCYTE (OHS): 8.5 % (ref 4–15)
RELATIVE NEUTROPHIL (OHS): 56.2 % (ref 38–73)
SODIUM SERPL-SCNC: 141 MMOL/L (ref 136–145)
TIBC SERPL-MCNC: 351 UG/DL (ref 250–450)
TRANSFERRIN SERPL-MCNC: 237 MG/DL (ref 200–375)
TRIGL SERPL-MCNC: 67 MG/DL (ref 30–150)
TSH SERPL-ACNC: 1.01 UIU/ML (ref 0.4–4)
WBC # BLD AUTO: 5.04 K/UL (ref 3.9–12.7)

## 2025-05-10 PROCEDURE — 84466 ASSAY OF TRANSFERRIN: CPT

## 2025-05-10 PROCEDURE — 80053 COMPREHEN METABOLIC PANEL: CPT

## 2025-05-10 PROCEDURE — 85025 COMPLETE CBC W/AUTO DIFF WBC: CPT

## 2025-05-10 PROCEDURE — 84443 ASSAY THYROID STIM HORMONE: CPT

## 2025-05-10 PROCEDURE — 80061 LIPID PANEL: CPT

## 2025-05-10 PROCEDURE — 36415 COLL VENOUS BLD VENIPUNCTURE: CPT

## 2025-05-10 PROCEDURE — 83036 HEMOGLOBIN GLYCOSYLATED A1C: CPT

## 2025-05-12 ENCOUNTER — RESULTS FOLLOW-UP (OUTPATIENT)
Dept: FAMILY MEDICINE | Facility: CLINIC | Age: 60
End: 2025-05-12

## 2025-08-16 ENCOUNTER — HOSPITAL ENCOUNTER (OUTPATIENT)
Dept: RADIOLOGY | Facility: HOSPITAL | Age: 60
Discharge: HOME | End: 2025-08-16
Attending: FAMILY MEDICINE
Payer: COMMERCIAL

## 2025-08-16 VITALS — BODY MASS INDEX: 35.14 KG/M2 | HEIGHT: 71 IN | WEIGHT: 251 LBS

## 2025-08-16 DIAGNOSIS — Z12.31 ENCOUNTER FOR SCREENING MAMMOGRAM FOR BREAST CANCER: ICD-10-CM

## 2025-08-16 PROCEDURE — 77063 BREAST TOMOSYNTHESIS BI: CPT | Mod: TC
